# Patient Record
Sex: FEMALE | Race: WHITE | NOT HISPANIC OR LATINO | Employment: UNEMPLOYED | URBAN - NONMETROPOLITAN AREA
[De-identification: names, ages, dates, MRNs, and addresses within clinical notes are randomized per-mention and may not be internally consistent; named-entity substitution may affect disease eponyms.]

---

## 2017-11-16 ENCOUNTER — APPOINTMENT (OUTPATIENT)
Dept: GENERAL RADIOLOGY | Facility: HOSPITAL | Age: 62
End: 2017-11-16

## 2017-11-16 ENCOUNTER — HOSPITAL ENCOUNTER (EMERGENCY)
Facility: HOSPITAL | Age: 62
Discharge: HOME | End: 2017-11-16
Attending: EMERGENCY MEDICINE | Admitting: EMERGENCY MEDICINE

## 2017-11-16 VITALS
BODY MASS INDEX: 22.49 KG/M2 | RESPIRATION RATE: 17 BRPM | HEART RATE: 84 BPM | OXYGEN SATURATION: 93 % | SYSTOLIC BLOOD PRESSURE: 129 MMHG | HEIGHT: 65 IN | DIASTOLIC BLOOD PRESSURE: 89 MMHG | WEIGHT: 135 LBS | TEMPERATURE: 98.2 F

## 2017-11-16 DIAGNOSIS — S82.831A CLOSED FRACTURE OF DISTAL END OF RIGHT FIBULA, UNSPECIFIED FRACTURE MORPHOLOGY, INITIAL ENCOUNTER: Primary | ICD-10-CM

## 2017-11-16 PROCEDURE — 73610 X-RAY EXAM OF ANKLE: CPT

## 2017-11-16 PROCEDURE — 99284 EMERGENCY DEPT VISIT MOD MDM: CPT

## 2017-11-16 RX ORDER — OXYCODONE AND ACETAMINOPHEN 10; 325 MG/1; MG/1
1 TABLET ORAL EVERY 4 HOURS PRN
Qty: 12 TABLET | Refills: 0 | OUTPATIENT
Start: 2017-11-16 | End: 2018-12-12 | Stop reason: HOSPADM

## 2017-11-16 RX ORDER — OXYCODONE AND ACETAMINOPHEN 7.5; 325 MG/1; MG/1
1 TABLET ORAL ONCE
Status: COMPLETED | OUTPATIENT
Start: 2017-11-16 | End: 2017-11-16

## 2017-11-16 RX ORDER — PAROXETINE HYDROCHLORIDE 20 MG/1
20 TABLET, FILM COATED ORAL EVERY MORNING
COMMUNITY
End: 2018-12-17

## 2017-11-16 RX ADMIN — OXYCODONE HYDROCHLORIDE AND ACETAMINOPHEN 1 TABLET: 7.5; 325 TABLET ORAL at 20:59

## 2017-11-17 NOTE — ED PROVIDER NOTES
Subjective   HPI Comments: Patient complaining of right ankle pain.  She was walking to a convenience store when she rolled the ankle.  She was able to continue and ambulated on the ankle to this for and then another 3 blocks back home.  She has swelling the lateral aspect.      History provided by:  Patient      Review of Systems   Constitutional: Negative for activity change, fatigue and fever.   HENT: Negative for congestion, ear pain, facial swelling, postnasal drip, rhinorrhea, sinus pressure, sore throat and trouble swallowing.    Eyes: Negative for photophobia and redness.   Respiratory: Negative for chest tightness, shortness of breath and wheezing.    Cardiovascular: Negative for chest pain and palpitations.   Gastrointestinal: Negative for abdominal distention, abdominal pain, diarrhea, nausea and vomiting.   Genitourinary: Negative for difficulty urinating, dysuria and flank pain.   Musculoskeletal: Positive for joint swelling. Negative for back pain and neck pain.        Right ankle pain   Skin: Negative for color change and wound.   Neurological: Negative for dizziness, speech difficulty, weakness, light-headedness and headaches.   Psychiatric/Behavioral: Negative for agitation, confusion, self-injury and suicidal ideas.       Past Medical History:   Diagnosis Date   • Anxiety    • PTSD (post-traumatic stress disorder)        No Known Allergies    Past Surgical History:   Procedure Laterality Date   • BREAST AUGMENTATION     •  SECTION     • HYSTERECTOMY     • TONSILLECTOMY         History reviewed. No pertinent family history.    Social History     Social History   • Marital status:      Spouse name: N/A   • Number of children: N/A   • Years of education: N/A     Social History Main Topics   • Smoking status: Current Every Day Smoker     Packs/day: 1.50   • Smokeless tobacco: None   • Alcohol use Yes      Comment: OCCASIONAL   • Drug use: No   • Sexual activity: Not Asked     Other  Topics Concern   • None     Social History Narrative   • None           Objective   Physical Exam   Constitutional: She is oriented to person, place, and time. She appears well-developed and well-nourished. No distress.   HENT:   Head: Normocephalic and atraumatic.   Mouth/Throat: Oropharynx is clear and moist.   Eyes: Conjunctivae and EOM are normal. Pupils are equal, round, and reactive to light.   Musculoskeletal: She exhibits edema and tenderness. She exhibits no deformity.        Right foot: There is decreased range of motion, tenderness, bony tenderness and swelling. There is normal capillary refill, no crepitus and no deformity.        Neurological: She is alert and oriented to person, place, and time. No cranial nerve deficit.   Skin: Skin is warm and dry. No erythema.   Psychiatric: She has a normal mood and affect. Her behavior is normal. Judgment and thought content normal.   Nursing note and vitals reviewed.      Splint - Cast - Strapping  Date/Time: 11/16/2017 8:32 PM  Performed by: DRU KRAUSE  Authorized by: DRU KRAUSE     Consent:     Consent obtained:  Verbal    Consent given by:  Patient    Risks discussed:  Discoloration, numbness, pain and swelling    Alternatives discussed:  Delayed treatment  Pre-procedure details:     Sensation:  Normal    Skin color:  Normal  Procedure details:     Laterality:  Right    Location:  Ankle    Ankle:  R ankle    Strapping: no      Splint type:  Short leg    Supplies:  Ortho-Glass  Post-procedure details:     Pain:  Improved    Sensation:  Normal    Skin color:  Normal    Patient tolerance of procedure:  Tolerated well, no immediate complications             ED Course  ED Course                  MDM  Number of Diagnoses or Management Options  Closed fracture of distal end of right fibula, unspecified fracture morphology, initial encounter: new and requires workup  Diagnosis management comments: X-ray shows patient has a nondisplaced distal tib-fib  fracture.  Patient will be placed in a short-leg splint and referred to orthophoria further evaluation.       Amount and/or Complexity of Data Reviewed  Tests in the radiology section of CPT®: ordered and reviewed  Independent visualization of images, tracings, or specimens: yes    Risk of Complications, Morbidity, and/or Mortality  Presenting problems: moderate  Diagnostic procedures: moderate  Management options: moderate    Patient Progress  Patient progress: stable      Final diagnoses:   Closed fracture of distal end of right fibula, unspecified fracture morphology, initial encounter            Basilio Birmingham MD  11/16/17 2100

## 2018-06-10 ENCOUNTER — HOSPITAL ENCOUNTER (EMERGENCY)
Facility: HOSPITAL | Age: 63
Discharge: HOME | End: 2018-06-11
Attending: FAMILY MEDICINE | Admitting: EMERGENCY MEDICINE

## 2018-06-10 ENCOUNTER — APPOINTMENT (OUTPATIENT)
Dept: CT IMAGING | Facility: HOSPITAL | Age: 63
End: 2018-06-10

## 2018-06-10 DIAGNOSIS — N30.90 CYSTITIS: ICD-10-CM
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
## 2018-06-10 DIAGNOSIS — T07.XXXA MULTIPLE CONTUSIONS: ICD-10-CM

## 2018-06-10 LAB
ANION GAP SERPL CALCULATED.3IONS-SCNC: 10 MMOL/L (ref 4–13)
BACTERIA UR QL AUTO: ABNORMAL /HPF
BASOPHILS # BLD AUTO: 0.04 10*3/MM3 (ref 0–0.2)
BASOPHILS NFR BLD AUTO: 0.4 % (ref 0–2)
BILIRUB UR QL STRIP: ABNORMAL
BUN BLD-MCNC: 20 MG/DL (ref 5–21)
BUN/CREAT SERPL: 17.4 (ref 7–25)
CALCIUM SPEC-SCNC: 9.3 MG/DL (ref 8.4–10.4)
CHLORIDE SERPL-SCNC: 103 MMOL/L (ref 98–110)
CLARITY UR: ABNORMAL
CO2 SERPL-SCNC: 30 MMOL/L (ref 24–31)
COD CRY URNS QL: ABNORMAL /HPF
COLOR UR: ABNORMAL
CREAT BLD-MCNC: 1.15 MG/DL (ref 0.5–1.4)
DEPRECATED RDW RBC AUTO: 39.4 FL (ref 40–54)
EOSINOPHIL # BLD AUTO: 0.03 10*3/MM3 (ref 0–0.7)
EOSINOPHIL NFR BLD AUTO: 0.3 % (ref 0–4)
ERYTHROCYTE [DISTWIDTH] IN BLOOD BY AUTOMATED COUNT: 12.7 % (ref 12–15)
GFR SERPL CREATININE-BSD FRML MDRD: 48 ML/MIN/1.73
GLUCOSE BLD-MCNC: 101 MG/DL (ref 70–100)
GLUCOSE UR STRIP-MCNC: NEGATIVE MG/DL
HCT VFR BLD AUTO: 40.9 % (ref 37–47)
HGB BLD-MCNC: 13.9 G/DL (ref 12–16)
HGB UR QL STRIP.AUTO: NEGATIVE
HYALINE CASTS UR QL AUTO: ABNORMAL /LPF
IMM GRANULOCYTES # BLD: 0.05 10*3/MM3 (ref 0–0.03)
IMM GRANULOCYTES NFR BLD: 0.5 % (ref 0–5)
KETONES UR QL STRIP: ABNORMAL
LEUKOCYTE ESTERASE UR QL STRIP.AUTO: ABNORMAL
LYMPHOCYTES # BLD AUTO: 1.06 10*3/MM3 (ref 0.72–4.86)
LYMPHOCYTES NFR BLD AUTO: 11.1 % (ref 15–45)
MCH RBC QN AUTO: 28.9 PG (ref 28–32)
MCHC RBC AUTO-ENTMCNC: 34 G/DL (ref 33–36)
MCV RBC AUTO: 85 FL (ref 82–98)
MONOCYTES # BLD AUTO: 0.48 10*3/MM3 (ref 0.19–1.3)
MONOCYTES NFR BLD AUTO: 5 % (ref 4–12)
NEUTROPHILS # BLD AUTO: 7.85 10*3/MM3 (ref 1.87–8.4)
NEUTROPHILS NFR BLD AUTO: 82.7 % (ref 39–78)
NITRITE UR QL STRIP: NEGATIVE
NRBC BLD MANUAL-RTO: 0 /100 WBC (ref 0–0)
PH UR STRIP.AUTO: <=5 [PH] (ref 5–8)
PLATELET # BLD AUTO: 308 10*3/MM3 (ref 130–400)
PMV BLD AUTO: 9.9 FL (ref 6–12)
POTASSIUM BLD-SCNC: 4.1 MMOL/L (ref 3.5–5.3)
PROT UR QL STRIP: ABNORMAL
RBC # BLD AUTO: 4.81 10*6/MM3 (ref 4.2–5.4)
RBC # UR: ABNORMAL /HPF
REF LAB TEST METHOD: ABNORMAL
SODIUM BLD-SCNC: 143 MMOL/L (ref 135–145)
SP GR UR STRIP: >=1.03 (ref 1–1.03)
SQUAMOUS #/AREA URNS HPF: ABNORMAL /HPF
UROBILINOGEN UR QL STRIP: ABNORMAL
WBC NRBC COR # BLD: 9.51 10*3/MM3 (ref 4.8–10.8)
WBC UR QL AUTO: ABNORMAL /HPF

## 2018-06-10 PROCEDURE — 99284 EMERGENCY DEPT VISIT MOD MDM: CPT

## 2018-06-10 PROCEDURE — 85025 COMPLETE CBC W/AUTO DIFF WBC: CPT | Performed by: FAMILY MEDICINE

## 2018-06-10 PROCEDURE — 81001 URINALYSIS AUTO W/SCOPE: CPT | Performed by: FAMILY MEDICINE

## 2018-06-10 PROCEDURE — 80048 BASIC METABOLIC PNL TOTAL CA: CPT | Performed by: FAMILY MEDICINE

## 2018-06-10 PROCEDURE — 71260 CT THORAX DX C+: CPT

## 2018-06-10 PROCEDURE — 70450 CT HEAD/BRAIN W/O DYE: CPT

## 2018-06-10 PROCEDURE — 70486 CT MAXILLOFACIAL W/O DYE: CPT

## 2018-06-10 PROCEDURE — 25010000002 IOPAMIDOL 61 % SOLUTION: Performed by: FAMILY MEDICINE

## 2018-06-10 PROCEDURE — 72125 CT NECK SPINE W/O DYE: CPT

## 2018-06-10 RX ADMIN — IOPAMIDOL 100 ML: 612 INJECTION, SOLUTION INTRAVENOUS at 23:32

## 2018-06-11 VITALS
WEIGHT: 120 LBS | DIASTOLIC BLOOD PRESSURE: 56 MMHG | TEMPERATURE: 97.2 F | SYSTOLIC BLOOD PRESSURE: 93 MMHG | RESPIRATION RATE: 16 BRPM | BODY MASS INDEX: 19.99 KG/M2 | HEART RATE: 74 BPM | OXYGEN SATURATION: 96 % | HEIGHT: 65 IN

## 2018-06-11 RX ORDER — DICLOFENAC SODIUM 75 MG/1
75 TABLET, DELAYED RELEASE ORAL 2 TIMES DAILY
Qty: 14 TABLET | Refills: 0 | Status: SHIPPED | OUTPATIENT
Start: 2018-06-11 | End: 2018-12-17

## 2018-06-11 RX ORDER — SULFAMETHOXAZOLE AND TRIMETHOPRIM 800; 160 MG/1; MG/1
1 TABLET ORAL 2 TIMES DAILY
Qty: 20 TABLET | Refills: 0 | Status: SHIPPED | OUTPATIENT
Start: 2018-06-11 | End: 2018-12-17
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
## 2018-06-19 ENCOUNTER — APPOINTMENT (OUTPATIENT)
Dept: GENERAL RADIOLOGY | Facility: HOSPITAL | Age: 63
End: 2018-06-19

## 2018-06-19 ENCOUNTER — HOSPITAL ENCOUNTER (EMERGENCY)
Facility: HOSPITAL | Age: 63
Discharge: HOME | End: 2018-06-19
Admitting: EMERGENCY MEDICINE

## 2018-06-19 VITALS
TEMPERATURE: 98.4 F | OXYGEN SATURATION: 97 % | HEIGHT: 65 IN | DIASTOLIC BLOOD PRESSURE: 66 MMHG | WEIGHT: 120 LBS | BODY MASS INDEX: 19.99 KG/M2 | SYSTOLIC BLOOD PRESSURE: 107 MMHG | RESPIRATION RATE: 12 BRPM | HEART RATE: 75 BPM
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
## 2018-06-19 DIAGNOSIS — S20.219S: ICD-10-CM

## 2018-06-19 DIAGNOSIS — S39.92XS BACK INJURY, SEQUELA: ICD-10-CM

## 2018-06-19 PROCEDURE — 72074 X-RAY EXAM THORAC SPINE4/>VW: CPT

## 2018-06-19 PROCEDURE — 71046 X-RAY EXAM CHEST 2 VIEWS: CPT

## 2018-06-19 PROCEDURE — 99282 EMERGENCY DEPT VISIT SF MDM: CPT

## 2018-06-19 RX ORDER — CLONAZEPAM 1 MG/1
1 TABLET ORAL 3 TIMES DAILY PRN
COMMUNITY
End: 2021-01-18
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
## 2018-12-12 ENCOUNTER — APPOINTMENT (OUTPATIENT)
Dept: CT IMAGING | Facility: HOSPITAL | Age: 63
End: 2018-12-12

## 2018-12-12 ENCOUNTER — HOSPITAL ENCOUNTER (EMERGENCY)
Facility: HOSPITAL | Age: 63
Discharge: HOME | End: 2018-12-12
Admitting: EMERGENCY MEDICINE

## 2018-12-12 ENCOUNTER — APPOINTMENT (OUTPATIENT)
Dept: GENERAL RADIOLOGY | Facility: HOSPITAL | Age: 63
End: 2018-12-12

## 2018-12-12 VITALS
SYSTOLIC BLOOD PRESSURE: 107 MMHG | TEMPERATURE: 98 F | RESPIRATION RATE: 18 BRPM | DIASTOLIC BLOOD PRESSURE: 61 MMHG | HEART RATE: 71 BPM | HEIGHT: 65 IN | BODY MASS INDEX: 22.39 KG/M2 | WEIGHT: 134.4 LBS | OXYGEN SATURATION: 93 %

## 2018-12-12 DIAGNOSIS — S02.2XXA CLOSED FRACTURE OF NASAL BONE, INITIAL ENCOUNTER: ICD-10-CM

## 2018-12-12 DIAGNOSIS — S09.93XA FACIAL INJURY, INITIAL ENCOUNTER: ICD-10-CM
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
## 2018-12-12 LAB
BILIRUB UR QL STRIP: NEGATIVE
CLARITY UR: CLEAR
COLOR UR: YELLOW
GLUCOSE UR STRIP-MCNC: NEGATIVE MG/DL
HGB UR QL STRIP.AUTO: NEGATIVE
KETONES UR QL STRIP: ABNORMAL
LEUKOCYTE ESTERASE UR QL STRIP.AUTO: NEGATIVE
NITRITE UR QL STRIP: NEGATIVE
PH UR STRIP.AUTO: <=5 [PH] (ref 5–8)
PROT UR QL STRIP: NEGATIVE
SP GR UR STRIP: 1.03 (ref 1–1.03)
UROBILINOGEN UR QL STRIP: ABNORMAL

## 2018-12-12 PROCEDURE — 72125 CT NECK SPINE W/O DYE: CPT

## 2018-12-12 PROCEDURE — 70486 CT MAXILLOFACIAL W/O DYE: CPT

## 2018-12-12 PROCEDURE — 71046 X-RAY EXAM CHEST 2 VIEWS: CPT

## 2018-12-12 PROCEDURE — 70450 CT HEAD/BRAIN W/O DYE: CPT

## 2018-12-12 PROCEDURE — 81003 URINALYSIS AUTO W/O SCOPE: CPT | Performed by: PHYSICIAN ASSISTANT

## 2018-12-12 PROCEDURE — 99283 EMERGENCY DEPT VISIT LOW MDM: CPT

## 2018-12-12 RX ORDER — AMOXICILLIN AND CLAVULANATE POTASSIUM 875; 125 MG/1; MG/1
1 TABLET, FILM COATED ORAL 2 TIMES DAILY
Qty: 14 TABLET | Refills: 0 | Status: SHIPPED | OUTPATIENT
Start: 2018-12-12 | End: 2021-01-18

## 2018-12-12 RX ORDER — ACETAMINOPHEN AND CODEINE PHOSPHATE 300; 30 MG/1; MG/1
1 TABLET ORAL EVERY 4 HOURS PRN
Qty: 8 TABLET | Refills: 0 | Status: SHIPPED | OUTPATIENT
Start: 2018-12-12 | End: 2018-12-17
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
## 2018-12-12 NOTE — ED NOTES
TALKED TO LATOYA FROM CASE MANAGEMENT ABOUT PATIENTS RESPONSE ABOUT NOT FEELING SAFE AT HOME.      Shruti Rivera, RN  12/12/18 1994
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
## 2018-12-12 NOTE — DISCHARGE INSTRUCTIONS
Nasal Fracture  A fracture is a break in a bone. A nasal fracture is a broken nose. Minor breaks do not need treatment. Serious breaks may need surgery.  Follow these instructions at home:  · If directed, put ice on the injured area:  ? Put ice in a plastic bag.  ? Place a towel between your skin and the bag.  ? Leave the ice on for 20 minutes, 2-3 times per day.  · Take over-the-counter and prescription medicines only as told by your doctor.  · If your nose bleeds, sit up while you gently squeeze your nose shut for 10 minutes.  · Try to not blow your nose.  · Return to your normal activities as told by your doctor. Ask your doctor what activities are safe for you.  · Do not play contact sports for 3-4 weeks or as told by your doctor.  · Keep all follow-up visits as told by your doctor. This is important.  Contact a doctor if:  · You have more pain or very bad pain.  · You keep having nosebleeds.  · The shape of your nose does not return to normal after 5 days.  · You have pus coming out of your nose.  Get help right away if:  · Your nose bleeds for more than 20 minutes.  · You have clear fluid draining out of your nose.  · You have a grape-like swelling on the inside of your nose.  · You have trouble moving your eyes.  · You keep throwing up (vomiting).  This information is not intended to replace advice given to you by your health care provider. Make sure you discuss any questions you have with your health care provider.  Document Released: 09/26/2009 Document Revised: 05/25/2017 Document Reviewed: 01/25/2016  Elsevier Interactive Patient Education © 2018 [employer]

## 2018-12-17 ENCOUNTER — OFFICE VISIT (OUTPATIENT)
Dept: OTOLARYNGOLOGY | Facility: CLINIC | Age: 63
End: 2018-12-17

## 2018-12-17 ENCOUNTER — APPOINTMENT (OUTPATIENT)
Dept: PREADMISSION TESTING | Facility: HOSPITAL | Age: 63
End: 2018-12-17

## 2018-12-17 VITALS
DIASTOLIC BLOOD PRESSURE: 68 MMHG | HEART RATE: 78 BPM | HEIGHT: 65 IN | BODY MASS INDEX: 22.49 KG/M2 | SYSTOLIC BLOOD PRESSURE: 110 MMHG | WEIGHT: 135 LBS | TEMPERATURE: 98.2 F | RESPIRATION RATE: 20 BRPM

## 2018-12-17 VITALS
WEIGHT: 134.92 LBS | RESPIRATION RATE: 15 BRPM | OXYGEN SATURATION: 97 % | SYSTOLIC BLOOD PRESSURE: 106 MMHG | HEIGHT: 65 IN | HEART RATE: 80 BPM | BODY MASS INDEX: 22.48 KG/M2 | DIASTOLIC BLOOD PRESSURE: 57 MMHG

## 2018-12-17 DIAGNOSIS — S02.2XXA CLOSED FRACTURE OF NASAL BONE, INITIAL ENCOUNTER: Primary | ICD-10-CM

## 2018-12-17 DIAGNOSIS — S02.2XXA CLOSED FRACTURE OF NASAL SEPTUM, INITIAL ENCOUNTER: ICD-10-CM

## 2018-12-17 DIAGNOSIS — Z72.0 TOBACCO ABUSE DISORDER: ICD-10-CM

## 2018-12-17 LAB
DEPRECATED RDW RBC AUTO: 40 FL (ref 40–54)
ERYTHROCYTE [DISTWIDTH] IN BLOOD BY AUTOMATED COUNT: 12.8 % (ref 12–15)
HCT VFR BLD AUTO: 34.8 % (ref 37–47)
HGB BLD-MCNC: 12 G/DL (ref 12–16)
MCH RBC QN AUTO: 29.6 PG (ref 28–32)
MCHC RBC AUTO-ENTMCNC: 34.5 G/DL (ref 33–36)
MCV RBC AUTO: 85.7 FL (ref 82–98)
PLATELET # BLD AUTO: 275 10*3/MM3 (ref 130–400)
PMV BLD AUTO: 10.3 FL (ref 6–12)
RBC # BLD AUTO: 4.06 10*6/MM3 (ref 4.2–5.4)
WBC NRBC COR # BLD: 6.08 10*3/MM3 (ref 4.8–10.8)

## 2018-12-17 PROCEDURE — 85027 COMPLETE CBC AUTOMATED: CPT | Performed by: OTOLARYNGOLOGY

## 2018-12-17 PROCEDURE — 99203 OFFICE O/P NEW LOW 30 MIN: CPT | Performed by: OTOLARYNGOLOGY

## 2018-12-17 PROCEDURE — 93005 ELECTROCARDIOGRAM TRACING: CPT

## 2018-12-17 PROCEDURE — 93010 ELECTROCARDIOGRAM REPORT: CPT | Performed by: INTERNAL MEDICINE

## 2018-12-17 PROCEDURE — 36415 COLL VENOUS BLD VENIPUNCTURE: CPT

## 2018-12-17 RX ORDER — DEXTROAMPHETAMINE SACCHARATE, AMPHETAMINE ASPARTATE MONOHYDRATE, DEXTROAMPHETAMINE SULFATE AND AMPHETAMINE SULFATE 2.5; 2.5; 2.5; 2.5 MG/1; MG/1; MG/1; MG/1
20 CAPSULE, EXTENDED RELEASE ORAL EVERY MORNING
COMMUNITY
End: 2021-01-18

## 2018-12-17 RX ORDER — SERTRALINE HYDROCHLORIDE 100 MG/1
TABLET, FILM COATED ORAL
Refills: 3 | COMMUNITY
Start: 2018-12-03 | End: 2021-01-18

## 2018-12-17 NOTE — PATIENT INSTRUCTIONS
##### TOBACCO CESSATION #####   IF YOU SMOKE OR USE TOBACCO PLEASE READ THE FOLLOWING:    Why is smoking bad for me?  Smoking increases the risk of heart disease, lung disease, vascular disease, stroke, and cancer.     If you smoke, STOP!    If you would like more information on quitting smoking, please visit the [employer] website: [email or web address]   This link will provide additional resources including the QUIT line and the Beat the Pack support groups.     For more information:    Quit Now [place]  1-800-QUIT-NOW  [email or web address]
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
## 2018-12-17 NOTE — DISCHARGE INSTRUCTIONS
DAY OF SURGERY INSTRUCTIONS        YOUR SURGEON: Darryl Benites     PROCEDURE: Closed Reduction of Nasal Fracture and Repair of Nasal Septal Fracture     DATE OF SURGERY: December 18, 2018     ARRIVAL TIME: AS DIRECTED BY OFFICE    YOU MAY TAKE THE FOLLOWING MEDICATION(S) THE MORNING OF SURGERY WITH A SIP OF WATER: Klonipin              MANAGING PAIN AFTER SURGERY    We know you are probably wondering what your pain will be like after surgery.  Following surgery it is unrealistic to expect you will not have pain.   Pain is how our bodies let us know that something is wrong or cautions us to be careful.  That said, our goal is to make your pain tolerable.    Methods we may use to treat your pain include (oral or IV medications, PCAs, epidurals, nerve blocks, etc.)   While some procedures require IV pain medications for a short time after surgery, transitioning to pain medications by mouth allows for better management of pain.   Your nurse will encourage you to take oral pain medications whenever possible.  IV medications work almost immediately, but only last a short while.  Taking medications by mouth allows for a more constant level of medication in your blood stream for a longer period of time.      Once your pain is out of control it is harder to get back under control.  It is important you are aware when your next dose of pain medication is due.  If you are admitted, your nurse may write the time of your next dose on the white board in your room to help you remember.      We are interested in your pain and encourage you to inform us about aggravating factors during your visit.   Many times a simple repositioning every few hours can make a big difference.    If your physician says it is okay, do not let your pain prevent you from getting out of bed. Be sure to call your nurse for assistance prior to getting up so you do not fall.      Before surgery, please decide your tolerable pain goal.  These faces help  describe the pain ratings we use on a 0-10 scale.   Be prepared to tell us your goal and whether or not you take pain or anxiety medications at home.            BEFORE YOU COME TO THE HOSPITAL  (Pre-op instructions)  • Do not eat, drink, smoke or chew gum after midnight the night before surgery.  This also includes no mints.  • Morning of surgery take only the medicines you have been instructed with a sip of water unless otherwise instructed  by your physician.  • Do not shave, wear makeup or dark nail polish.  • Remove all jewelry including rings.  • Leave anything you consider valuable at home.  • Leave your suitcase in the car until after your surgery.  • Bring the following with you if applicable:  o Picture ID and insurance, Medicare or Medicaid cards  o Co-pay/deductible required by insurance (cash, check, credit card)  o Copy of advance directive, living will or power-of- documents if not brought to PAT  o CPAP or BIPAP mask and tubing  o Relaxation aids (MP3 player, book, magazine)  • On the day of surgery check in at registration located at the main entrance of the hospital.       Outpatient Surgery Guidelines, Adult  Outpatient procedures are those for which the person having the procedure is allowed to go home the same day as the procedure. Various procedures are done on an outpatient basis. You should follow some general guidelines if you will be having an outpatient procedure.  LET YOUR HEALTH CARE PROVIDER KNOW ABOUT:  · Any allergies you have.  · All medicines you are taking, including vitamins, herbs, eye drops, creams, and over-the-counter medicines.  · Previous problems you or members of your family have had with the use of anesthetics.  · Any blood disorders you have.  · Previous surgeries you have had.  · Medical conditions you have.  RISKS AND COMPLICATIONS  Your health care provider will discuss possible risks and complications with you before surgery. Common risks and complications  include:    · Problems due to the use of anesthetics.  · Blood loss and replacement (does not apply to minor surgical procedures).  · Temporary increase in pain due to surgery.  · Uncorrected pain or problems that the surgery was meant to correct.  · Infection.  · New damage.  BEFORE THE PROCEDURE  · Ask your health care provider about changing or stopping your regular medicines. You may need to stop taking certain medicines in the days or weeks before the procedure.  · Stop smoking at least 2 weeks before surgery. This lowers your risk for complications during and after surgery. Ask your health care provider for help with this if needed.  · Eat your usual meals and a light supper the day before surgery. Continue fluid intake. Do not drink alcohol.  · Do not eat or drink after midnight the night before your surgery.   · Arrange for someone to take you home and to stay with you for 24 hours after the procedure. Medicine given for your procedure may affect your ability to drive or to care for yourself.  · Call your health care provider's office if you develop an illness or problem that may prevent you from safely having your procedure.  AFTER THE PROCEDURE  After surgery, you will be taken to a recovery area, where your progress will be monitored. If there are no complications, you will be allowed to go home when you are awake, stable, and taking fluids well. You may have numbness around the surgical site. Healing will take some time. You will have tenderness at the surgical site and may have some swelling and bruising. You may also have some nausea.  HOME CARE INSTRUCTIONS  · Do not drive for 24 hours, or as directed by your health care provider. Do not drive while taking prescription pain medicines.  · Do not drink alcohol for 24 hours.  · Do not make important decisions or sign legal documents for 24 hours.  · You may resume a normal diet and activities as directed.  · Do not lift anything heavier than 10 pounds  (4.5 kg) or play contact sports until your health care provider says it is okay.  · Change your bandages (dressings) as directed.  · Only take over-the-counter or prescription medicines as directed by your health care provider.  · Follow up with your health care provider as directed.  SEEK MEDICAL CARE IF:  · You have increased bleeding (more than a small spot) from the surgical site.  · You have redness, swelling, or increasing pain in the wound.  · You see pus coming from the wound.  · You have a fever.  · You notice a bad smell coming from the wound or dressing.  · You feel lightheaded or faint.  · You develop a rash.  · You have trouble breathing.  · You develop allergies.  MAKE SURE YOU:  · Understand these instructions.  · Will watch your condition.  · Will get help right away if you are not doing well or get worse.     This information is not intended to replace advice given to you by your health care provider. Make sure you discuss any questions you have with your health care provider.     Document Released: 09/12/2002 Document Revised: 05/03/2016 Document Reviewed: 05/22/2014  [employer] Interactive Patient Education ©2016 [employer]       Fall Prevention in Hospitals, Adult  As a hospital patient, your condition and the treatments you receive can increase your risk for falls. Some additional risk factors for falls in a hospital include:  · Being in an unfamiliar environment.  · Being on bed rest.  · Your surgery.  · Taking certain medicines.  · Your tubing requirements, such as intravenous (IV) therapy or catheters.  It is important that you learn how to decrease fall risks while at the hospital. Below are important tips that can help prevent falls.  SAFETY TIPS FOR PREVENTING FALLS  Talk about your risk of falling.  · Ask your health care provider why you are at risk for falling. Is it your medicine, illness, tubing placement, or something else?  · Make a plan with your health care provider to keep you  safe from falls.  · Ask your health care provider or pharmacist about side effects of your medicines. Some medicines can make you dizzy or affect your coordination.  Ask for help.  · Ask for help before getting out of bed. You may need to press your call button.  · Ask for assistance in getting safely to the toilet.  · Ask for a walker or cane to be put at your bedside. Ask that most of the side rails on your bed be placed up before your health care provider leaves the room.  · Ask family or friends to sit with you.  · Ask for things that are out of your reach, such as your glasses, hearing aids, telephone, bedside table, or call button.  Follow these tips to avoid falling:  · Stay lying or seated, rather than standing, while waiting for help.  · Wear rubber-soled slippers or shoes whenever you walk in the hospital.  · Avoid quick, sudden movements.  ¨ Change positions slowly.  ¨ Sit on the side of your bed before standing.  ¨ Stand up slowly and wait before you start to walk.  · Let your health care provider know if there is a spill on the floor.  · Pay careful attention to the medical equipment, electrical cords, and tubes around you.  · When you need help, use your call button by your bed or in the bathroom. Wait for one of your health care providers to help you.  · If you feel dizzy or unsure of your footing, return to bed and wait for assistance.  · Avoid being distracted by the TV, telephone, or another person in your room.  · Do not lean or support yourself on rolling objects, such as IV poles or bedside tables.     This information is not intended to replace advice given to you by your health care provider. Make sure you discuss any questions you have with your health care provider.     Document Released: 12/15/2001 Document Revised: 01/08/2016 Document Reviewed: 08/25/2013  [employer] Interactive Patient Education ©2016 [employer]       Surgical Site Infections FAQs  What is a Surgical Site Infection  (SSI)?  A surgical site infection is an infection that occurs after surgery in the part of the body where the surgery took place. Most patients who have surgery do not develop an infection. However, infections develop in about 1 to 3 out of every 100 patients who have surgery.  Some of the common symptoms of a surgical site infection are:  · Redness and pain around the area where you had surgery  · Drainage of cloudy fluid from your surgical wound  · Fever  Can SSIs be treated?  Yes. Most surgical site infections can be treated with antibiotics. The antibiotic given to you depends on the bacteria (germs) causing the infection. Sometimes patients with SSIs also need another surgery to treat the infection.  What are some of the things that hospitals are doing to prevent SSIs?  To prevent SSIs, doctors, nurses, and other healthcare providers:  · Clean their hands and arms up to their elbows with an antiseptic agent just before the surgery.  · Clean their hands with soap and water or an alcohol-based hand rub before and after caring for each patient.  · May remove some of your hair immediately before your surgery using electric clippers if the hair is in the same area where the procedure will occur. They should not shave you with a razor.  · Wear special hair covers, masks, gowns, and gloves during surgery to keep the surgery area clean.  · Give you antibiotics before your surgery starts. In most cases, you should get antibiotics within 60 minutes before the surgery starts and the antibiotics should be stopped within 24 hours after surgery.  · Clean the skin at the site of your surgery with a special soap that kills germs.  What can I do to help prevent SSIs?  Before your surgery:  · Tell your doctor about other medical problems you may have. Health problems such as allergies, diabetes, and obesity could affect your surgery and your treatment.  · Quit smoking. Patients who smoke get more infections. Talk to your doctor  about how you can quit before your surgery.  · Do not shave near where you will have surgery. Shaving with a razor can irritate your skin and make it easier to develop an infection.  At the time of your surgery:  · Speak up if someone tries to shave you with a razor before surgery. Ask why you need to be shaved and talk with your surgeon if you have any concerns.  · Ask if you will get antibiotics before surgery.  After your surgery:  · Make sure that your healthcare providers clean their hands before examining you, either with soap and water or an alcohol-based hand rub.  · If you do not see your providers clean their hands, please ask them to do so.  · Family and friends who visit you should not touch the surgical wound or dressings.  · Family and friends should clean their hands with soap and water or an alcohol-based hand rub before and after visiting you. If you do not see them clean their hands, ask them to clean their hands.  What do I need to do when I go home from the hospital?  · Before you go home, your doctor or nurse should explain everything you need to know about taking care of your wound. Make sure you understand how to care for your wound before you leave the hospital.  · Always clean your hands before and after caring for your wound.  · Before you go home, make sure you know who to contact if you have questions or problems after you get home.  · If you have any symptoms of an infection, such as redness and pain at the surgery site, drainage, or fever, call your doctor immediately.  If you have additional questions, please ask your doctor or nurse.  Developed and co-sponsored by The Society for Healthcare Epidemiology of [place] (SHEA); Infectious Diseases Society of [place] (IDSA); American Hospital Association; Association for Professionals in Infection Control and Epidemiology (APIC); Centers for Disease Control and Prevention (CDC); and The Joint Commission.     This information is not intended  to replace advice given to you by your health care provider. Make sure you discuss any questions you have with your health care provider.     Document Released: 12/23/2014 Document Revised: 01/08/2016 Document Reviewed: 03/02/2016  [employer] Interactive Patient Education ©2016 [employer]     PATIENT/FAMILY/RESPONSIBLE PARTY VERBALIZES UNDERSTANDING OF ABOVE EDUCATION.  COPY OF PAIN SCALE GIVEN AND REVIEWED WITH VERBALIZED UNDERSTANDING.

## 2018-12-18 ENCOUNTER — ANESTHESIA (OUTPATIENT)
Dept: PERIOP | Facility: HOSPITAL | Age: 63
End: 2018-12-18

## 2018-12-18 ENCOUNTER — HOSPITAL ENCOUNTER (OUTPATIENT)
Facility: HOSPITAL | Age: 63
Setting detail: HOSPITAL OUTPATIENT SURGERY
Discharge: HOME | End: 2018-12-18
Attending: OTOLARYNGOLOGY | Admitting: OTOLARYNGOLOGY

## 2018-12-18 ENCOUNTER — ANESTHESIA EVENT (OUTPATIENT)
Dept: PERIOP | Facility: HOSPITAL | Age: 63
End: 2018-12-18

## 2018-12-18 VITALS
HEART RATE: 68 BPM | OXYGEN SATURATION: 95 % | TEMPERATURE: 98.6 F | SYSTOLIC BLOOD PRESSURE: 119 MMHG | RESPIRATION RATE: 16 BRPM | DIASTOLIC BLOOD PRESSURE: 70 MMHG

## 2018-12-18 PROCEDURE — 21336 OPEN TX SEPTAL FX W/WO STABJ: CPT | Performed by: OTOLARYNGOLOGY

## 2018-12-18 PROCEDURE — 21320 CLSD TX NSL FX W/MNPJ&STABLJ: CPT | Performed by: OTOLARYNGOLOGY

## 2018-12-18 PROCEDURE — 25010000002 SUCCINYLCHOLINE PER 20 MG: Performed by: NURSE ANESTHETIST, CERTIFIED REGISTERED

## 2018-12-18 PROCEDURE — 25010000002 DEXAMETHASONE PER 1 MG: Performed by: NURSE ANESTHETIST, CERTIFIED REGISTERED

## 2018-12-18 PROCEDURE — 25010000002 PROPOFOL 10 MG/ML EMULSION: Performed by: NURSE ANESTHETIST, CERTIFIED REGISTERED

## 2018-12-18 PROCEDURE — 25010000002 ONDANSETRON PER 1 MG: Performed by: NURSE ANESTHETIST, CERTIFIED REGISTERED

## 2018-12-18 PROCEDURE — 25010000003 CEFAZOLIN PER 500 MG: Performed by: NURSE ANESTHETIST, CERTIFIED REGISTERED

## 2018-12-18 RX ORDER — ONDANSETRON 2 MG/ML
INJECTION INTRAMUSCULAR; INTRAVENOUS AS NEEDED
Status: DISCONTINUED | OUTPATIENT
Start: 2018-12-18 | End: 2018-12-18 | Stop reason: SURG

## 2018-12-18 RX ORDER — LIDOCAINE HYDROCHLORIDE 20 MG/ML
INJECTION, SOLUTION INFILTRATION; PERINEURAL AS NEEDED
Status: DISCONTINUED | OUTPATIENT
Start: 2018-12-18 | End: 2018-12-18 | Stop reason: SURG

## 2018-12-18 RX ORDER — FAMOTIDINE 10 MG/ML
20 INJECTION, SOLUTION INTRAVENOUS
Status: DISCONTINUED | OUTPATIENT
Start: 2018-12-18 | End: 2018-12-18 | Stop reason: HOSPADM

## 2018-12-18 RX ORDER — SCOLOPAMINE TRANSDERMAL SYSTEM 1 MG/1
1 PATCH, EXTENDED RELEASE TRANSDERMAL CONTINUOUS
Status: DISCONTINUED | OUTPATIENT
Start: 2018-12-18 | End: 2018-12-18 | Stop reason: HOSPADM

## 2018-12-18 RX ORDER — LABETALOL HYDROCHLORIDE 5 MG/ML
5 INJECTION, SOLUTION INTRAVENOUS
Status: DISCONTINUED | OUTPATIENT
Start: 2018-12-18 | End: 2018-12-18 | Stop reason: HOSPADM

## 2018-12-18 RX ORDER — OXYCODONE AND ACETAMINOPHEN 7.5; 325 MG/1; MG/1
2 TABLET ORAL EVERY 4 HOURS PRN
Status: DISCONTINUED | OUTPATIENT
Start: 2018-12-18 | End: 2018-12-18 | Stop reason: HOSPADM

## 2018-12-18 RX ORDER — NALOXONE HCL 0.4 MG/ML
0.4 VIAL (ML) INJECTION AS NEEDED
Status: DISCONTINUED | OUTPATIENT
Start: 2018-12-18 | End: 2018-12-18 | Stop reason: HOSPADM

## 2018-12-18 RX ORDER — IBUPROFEN 600 MG/1
600 TABLET ORAL ONCE AS NEEDED
Status: DISCONTINUED | OUTPATIENT
Start: 2018-12-18 | End: 2018-12-18 | Stop reason: HOSPADM

## 2018-12-18 RX ORDER — ACETAMINOPHEN 500 MG
1000 TABLET ORAL ONCE
Status: DISCONTINUED | OUTPATIENT
Start: 2018-12-18 | End: 2018-12-18

## 2018-12-18 RX ORDER — IPRATROPIUM BROMIDE AND ALBUTEROL SULFATE 2.5; .5 MG/3ML; MG/3ML
3 SOLUTION RESPIRATORY (INHALATION) ONCE AS NEEDED
Status: DISCONTINUED | OUTPATIENT
Start: 2018-12-18 | End: 2018-12-18 | Stop reason: HOSPADM

## 2018-12-18 RX ORDER — MEPERIDINE HYDROCHLORIDE 25 MG/ML
12.5 INJECTION INTRAMUSCULAR; INTRAVENOUS; SUBCUTANEOUS
Status: DISCONTINUED | OUTPATIENT
Start: 2018-12-18 | End: 2018-12-18 | Stop reason: HOSPADM

## 2018-12-18 RX ORDER — MIDAZOLAM HYDROCHLORIDE 1 MG/ML
2 INJECTION INTRAMUSCULAR; INTRAVENOUS
Status: DISCONTINUED | OUTPATIENT
Start: 2018-12-18 | End: 2018-12-18 | Stop reason: HOSPADM

## 2018-12-18 RX ORDER — DEXAMETHASONE SODIUM PHOSPHATE 4 MG/ML
4 INJECTION, SOLUTION INTRA-ARTICULAR; INTRALESIONAL; INTRAMUSCULAR; INTRAVENOUS; SOFT TISSUE ONCE AS NEEDED
Status: COMPLETED | OUTPATIENT
Start: 2018-12-18 | End: 2018-12-18

## 2018-12-18 RX ORDER — HYDROCODONE BITARTRATE AND ACETAMINOPHEN 7.5; 325 MG/1; MG/1
1 TABLET ORAL EVERY 4 HOURS PRN
Qty: 12 TABLET | Refills: 0 | Status: SHIPPED | OUTPATIENT
Start: 2018-12-18 | End: 2021-01-18

## 2018-12-18 RX ORDER — SODIUM CHLORIDE 0.9 % (FLUSH) 0.9 %
1-10 SYRINGE (ML) INJECTION AS NEEDED
Status: DISCONTINUED | OUTPATIENT
Start: 2018-12-18 | End: 2018-12-18 | Stop reason: HOSPADM

## 2018-12-18 RX ORDER — LIDOCAINE HYDROCHLORIDE 40 MG/ML
SOLUTION TOPICAL AS NEEDED
Status: DISCONTINUED | OUTPATIENT
Start: 2018-12-18 | End: 2018-12-18 | Stop reason: SURG

## 2018-12-18 RX ORDER — OXYCODONE AND ACETAMINOPHEN 10; 325 MG/1; MG/1
1 TABLET ORAL ONCE AS NEEDED
Status: DISCONTINUED | OUTPATIENT
Start: 2018-12-18 | End: 2018-12-18 | Stop reason: HOSPADM

## 2018-12-18 RX ORDER — METOCLOPRAMIDE HYDROCHLORIDE 5 MG/ML
5 INJECTION INTRAMUSCULAR; INTRAVENOUS
Status: DISCONTINUED | OUTPATIENT
Start: 2018-12-18 | End: 2018-12-18 | Stop reason: HOSPADM

## 2018-12-18 RX ORDER — ROCURONIUM BROMIDE 10 MG/ML
INJECTION, SOLUTION INTRAVENOUS AS NEEDED
Status: DISCONTINUED | OUTPATIENT
Start: 2018-12-18 | End: 2018-12-18 | Stop reason: SURG

## 2018-12-18 RX ORDER — SODIUM CHLORIDE, SODIUM LACTATE, POTASSIUM CHLORIDE, CALCIUM CHLORIDE 600; 310; 30; 20 MG/100ML; MG/100ML; MG/100ML; MG/100ML
100 INJECTION, SOLUTION INTRAVENOUS CONTINUOUS
Status: DISCONTINUED | OUTPATIENT
Start: 2018-12-18 | End: 2018-12-18 | Stop reason: HOSPADM

## 2018-12-18 RX ORDER — SUCCINYLCHOLINE CHLORIDE 20 MG/ML
INJECTION INTRAMUSCULAR; INTRAVENOUS AS NEEDED
Status: DISCONTINUED | OUTPATIENT
Start: 2018-12-18 | End: 2018-12-18 | Stop reason: SURG

## 2018-12-18 RX ORDER — FENTANYL CITRATE 50 UG/ML
25 INJECTION, SOLUTION INTRAMUSCULAR; INTRAVENOUS AS NEEDED
Status: DISCONTINUED | OUTPATIENT
Start: 2018-12-18 | End: 2018-12-18 | Stop reason: HOSPADM

## 2018-12-18 RX ORDER — CEFAZOLIN SODIUM 1 G/3ML
INJECTION, POWDER, FOR SOLUTION INTRAMUSCULAR; INTRAVENOUS AS NEEDED
Status: DISCONTINUED | OUTPATIENT
Start: 2018-12-18 | End: 2018-12-18 | Stop reason: SURG

## 2018-12-18 RX ORDER — MIDAZOLAM HYDROCHLORIDE 1 MG/ML
1 INJECTION INTRAMUSCULAR; INTRAVENOUS
Status: DISCONTINUED | OUTPATIENT
Start: 2018-12-18 | End: 2018-12-18 | Stop reason: HOSPADM

## 2018-12-18 RX ORDER — OXYMETAZOLINE HYDROCHLORIDE 0.05 G/100ML
SPRAY NASAL AS NEEDED
Status: DISCONTINUED | OUTPATIENT
Start: 2018-12-18 | End: 2018-12-18 | Stop reason: HOSPADM

## 2018-12-18 RX ORDER — LIDOCAINE HYDROCHLORIDE AND EPINEPHRINE 10; 10 MG/ML; UG/ML
INJECTION, SOLUTION INFILTRATION; PERINEURAL AS NEEDED
Status: DISCONTINUED | OUTPATIENT
Start: 2018-12-18 | End: 2018-12-18 | Stop reason: HOSPADM

## 2018-12-18 RX ORDER — DEXAMETHASONE SODIUM PHOSPHATE 4 MG/ML
INJECTION, SOLUTION INTRA-ARTICULAR; INTRALESIONAL; INTRAMUSCULAR; INTRAVENOUS; SOFT TISSUE AS NEEDED
Status: DISCONTINUED | OUTPATIENT
Start: 2018-12-18 | End: 2018-12-18 | Stop reason: SURG

## 2018-12-18 RX ORDER — OXYMETAZOLINE HYDROCHLORIDE 0.05 G/100ML
2 SPRAY NASAL
Status: COMPLETED | OUTPATIENT
Start: 2018-12-18 | End: 2018-12-18

## 2018-12-18 RX ORDER — SODIUM CHLORIDE 0.9 % (FLUSH) 0.9 %
3 SYRINGE (ML) INJECTION EVERY 12 HOURS SCHEDULED
Status: DISCONTINUED | OUTPATIENT
Start: 2018-12-18 | End: 2018-12-18 | Stop reason: HOSPADM

## 2018-12-18 RX ORDER — PROPOFOL 10 MG/ML
VIAL (ML) INTRAVENOUS AS NEEDED
Status: DISCONTINUED | OUTPATIENT
Start: 2018-12-18 | End: 2018-12-18 | Stop reason: SURG

## 2018-12-18 RX ORDER — SODIUM CHLORIDE 0.9 % (FLUSH) 0.9 %
3 SYRINGE (ML) INJECTION AS NEEDED
Status: DISCONTINUED | OUTPATIENT
Start: 2018-12-18 | End: 2018-12-18 | Stop reason: HOSPADM

## 2018-12-18 RX ORDER — SODIUM CHLORIDE, SODIUM LACTATE, POTASSIUM CHLORIDE, CALCIUM CHLORIDE 600; 310; 30; 20 MG/100ML; MG/100ML; MG/100ML; MG/100ML
1000 INJECTION, SOLUTION INTRAVENOUS CONTINUOUS
Status: DISCONTINUED | OUTPATIENT
Start: 2018-12-18 | End: 2018-12-18 | Stop reason: HOSPADM

## 2018-12-18 RX ORDER — ONDANSETRON 2 MG/ML
4 INJECTION INTRAMUSCULAR; INTRAVENOUS ONCE AS NEEDED
Status: DISCONTINUED | OUTPATIENT
Start: 2018-12-18 | End: 2018-12-18 | Stop reason: HOSPADM

## 2018-12-18 RX ADMIN — OXYMETAZOLINE HYDROCHLORIDE 2 SPRAY: 5 SPRAY NASAL at 07:36

## 2018-12-18 RX ADMIN — SCOPALAMINE 1 PATCH: 1 PATCH, EXTENDED RELEASE TRANSDERMAL at 07:24

## 2018-12-18 RX ADMIN — LIDOCAINE HYDROCHLORIDE 0.5 ML: 10 INJECTION, SOLUTION EPIDURAL; INFILTRATION; INTRACAUDAL; PERINEURAL at 06:57

## 2018-12-18 RX ADMIN — PROPOFOL 150 MG: 10 INJECTION, EMULSION INTRAVENOUS at 07:59

## 2018-12-18 RX ADMIN — LIDOCAINE HYDROCHLORIDE 50 MG: 20 INJECTION, SOLUTION INFILTRATION; PERINEURAL at 07:59

## 2018-12-18 RX ADMIN — SODIUM CHLORIDE, POTASSIUM CHLORIDE, SODIUM LACTATE AND CALCIUM CHLORIDE 1000 ML: 600; 310; 30; 20 INJECTION, SOLUTION INTRAVENOUS at 06:57

## 2018-12-18 RX ADMIN — SUCCINYLCHOLINE CHLORIDE 100 MG: 20 INJECTION, SOLUTION INTRAMUSCULAR; INTRAVENOUS at 07:59

## 2018-12-18 RX ADMIN — LIDOCAINE HYDROCHLORIDE 1 EACH: 40 SOLUTION TOPICAL at 07:59

## 2018-12-18 RX ADMIN — DEXAMETHASONE SODIUM PHOSPHATE 4 MG: 4 INJECTION, SOLUTION INTRA-ARTICULAR; INTRALESIONAL; INTRAMUSCULAR; INTRAVENOUS; SOFT TISSUE at 07:25

## 2018-12-18 RX ADMIN — SODIUM CHLORIDE, POTASSIUM CHLORIDE, SODIUM LACTATE AND CALCIUM CHLORIDE: 600; 310; 30; 20 INJECTION, SOLUTION INTRAVENOUS at 08:30

## 2018-12-18 RX ADMIN — ROCURONIUM BROMIDE 5 MG: 10 INJECTION INTRAVENOUS at 07:59

## 2018-12-18 RX ADMIN — DEXAMETHASONE SODIUM PHOSPHATE 4 MG: 4 INJECTION, SOLUTION INTRAMUSCULAR; INTRAVENOUS at 08:14

## 2018-12-18 RX ADMIN — FAMOTIDINE 20 MG: 10 INJECTION, SOLUTION INTRAVENOUS at 07:24

## 2018-12-18 RX ADMIN — CEFAZOLIN 1 G: 1 INJECTION, POWDER, FOR SOLUTION INTRAVENOUS at 08:01

## 2018-12-18 RX ADMIN — ONDANSETRON HYDROCHLORIDE 4 MG: 2 SOLUTION INTRAMUSCULAR; INTRAVENOUS at 08:14

## 2018-12-18 NOTE — ANESTHESIA PROCEDURE NOTES
ANESTHESIA INTUBATION  Urgency: elective    Airway not difficult    General Information and Staff    Patient location during procedure: OR  CRNA: BIPIN Sánchze CRNA    Indications and Patient Condition  Indications for airway management: airway protection    Preoxygenated: yes  MILS maintained throughout  Mask difficulty assessment: 1 - vent by mask    Final Airway Details  Final airway type: endotracheal airway      Successful airway: ETT and ALEX tube  Cuffed: yes   Successful intubation technique: direct laryngoscopy  Facilitating devices/methods: intubating stylet  Endotracheal tube insertion site: oral  Blade: Grant  Blade size: 3  ETT size (mm): 7.0  Cormack-Lehane Classification: grade I - full view of glottis  Placement verified by: chest auscultation and capnometry   Cuff volume (mL): 5  Measured from: lips  ETT to lips (cm): 20  Number of attempts at approach: 1

## 2018-12-18 NOTE — DISCHARGE INSTRUCTIONS

## 2018-12-18 NOTE — OP NOTE
Preprocedure diagnosis:     1) Closed nasal fracture    2) Closed nasal septal fracture    Post procedure diagnosis:     1) Closed nasal fracture    2) Closed nasal septal fracture      Procedure performed: 1) closed nasal reduction with stabilization   2) open nasal septal fracture repair    Surgeon: Daryrl Benites M.D.    Anesthesia: General    IV fluids: Per anesthesia    Estimated blood loss: 15cc    Drains: None    Disposition: PACU    Complications: None    Specimens: None    Operative findings: The nasal pyramid was shifted to the left with the left nasal bone lateralized and the right nasal bone medialized.  The nasal septum was fractured and deflected to the patient's right dorsal aspect.  On the ventral aspect, the septum was off the maxillary crest on the left nasal floor.    Details: after patient verification and consent material was reviewed, the patient was taken to the operating room and laid supine on the operative table.  A formal timeout procedure was performed after the induction of general endotracheal anesthesia.    The patient was draped.  I placed pledgets soaked in Afrin intranasally for approximately 2 minutes.  The pledgets were removed.      The Hale elevator was placed into the patient's right nasal cavity,where anterior and lateral pressure was placed on the nasal bone, effectively lateralizing this.  At the same time I placed digital pressure and medial direction on the left nasal bone, effectively reducing this.    Examination demonstrated excellent reduction.    Intranasal examination demonstrated septal deviation to the right dorsally; to the left ventrally.  A caudal elevator was used to gently palpate the septum and this was noted be mobile.  Then used a 15 blade and made a left sided hemitransfixion incision and elevated bilateral submucoperichondrial flaps utilizing the caudal elevator.  The septum was deflected off onto the floor in the left nasal cavity was off the  maxillary crest.  This was dissected free from the surrounding tissue.  The tissue was dissected off the maxillary crest and I placed the septum back on the maxillary crest removing a small amount to allow a swinging-door  technique.  I then placed a figure of 8 of 3-0 chromic suture to fix the septum onto the maxillary crest.  I then closed the hemitransfixion incision using a running, locking 3-0 chromic suture.  Then used a quilting stitch of 4-0 chromic suture to reapproximate the mucosal flaps.    A thermoplastic splint consisting of Mastisol, Steri-Strips,the foam strip, a medium thermoplastic splint, Steri-Strips, and brown paper tape was placed.next    This marks the conclusion of the procedure the patient was weaned from anesthesia and transferred to the PACU for recovery without complication

## 2018-12-18 NOTE — ANESTHESIA POSTPROCEDURE EVALUATION
Patient: Gloria Case    Procedure Summary     Date:  12/18/18 Room / Location:  [facility] OR  /  [facility] OR    Anesthesia Start:  0756 Anesthesia Stop:  0842    Procedure:  Closed reduction of nasal fracture and repair of nasal septal fracture (N/A Nose) Diagnosis:       Closed fracture of nasal bone, initial encounter      Closed fracture of nasal septum, initial encounter      (Closed fracture of nasal bone, initial encounter [S02.2XXA])      (Closed fracture of nasal septum, initial encounter [S02.2XXA])    Surgeon:  Darryl Benites MD Provider:  BIPIN Sánchez CRNA    Anesthesia Type:  general ASA Status:  2          Anesthesia Type: general  Last vitals  BP   119/70 (12/18/18 1115)   Temp   98.6 °F (37 °C) (12/18/18 0940)   Pulse   68 (12/18/18 1115)   Resp   16 (12/18/18 1115)     SpO2   95 % (12/18/18 1115)     Post Anesthesia Care and Evaluation    Patient location during evaluation: PACU  Patient participation: complete - patient participated  Level of consciousness: awake and alert  Pain management: adequate  Airway patency: patent  Anesthetic complications: No anesthetic complications    Cardiovascular status: acceptable  Respiratory status: acceptable  Hydration status: acceptable    Comments: Blood pressure 119/70, pulse 68, temperature 98.6 °F (37 °C), temperature source Temporal, resp. rate 16, SpO2 95 %.    Pt discharged from PACU based on bigg score >8
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
## 2018-12-19 ENCOUNTER — TELEPHONE (OUTPATIENT)
Dept: OTOLARYNGOLOGY | Facility: CLINIC | Age: 63
End: 2018-12-19

## 2018-12-19 NOTE — TELEPHONE ENCOUNTER
Postop Phone Call:    No answer.  I did not leave message (no personal mailbox address)    Darryl Benites MD

## 2018-12-20 ENCOUNTER — TELEPHONE (OUTPATIENT)
Dept: OTOLARYNGOLOGY | Facility: CLINIC | Age: 63
End: 2018-12-20

## 2018-12-20 NOTE — TELEPHONE ENCOUNTER
Patient stopped by the office today. She states that she had missed a call from our office. I told her Dr. Benites had tried to reach her to see how she was doing after surgery. Patient requested I change the tape on her dressing. She had removed the brown paper tape that was placed after surgery and had replaced it with clear transpore tape and gauze over splint. Patient states the transpore tape was very irritating to her skin. I gently removed the transpore tape and used brown paper tape. I instructed patient to leave splint and dressing intact and provided her with additional tape to reinforce if needed.  Patient states that her kitten jumped up and hit her nose last night and she had a slight amount of bleeding after. Denies nose bleed today.   Patient said the pain medication that was given was not strong enough and ask if Dr. Benites could write a new Rx for something stronger. I advised that Dr. Benites is not in the office today, and we could not give her anything stronger for pain than what was written. Instructed to keep post op appointment on 12/26/2018.

## 2018-12-26 ENCOUNTER — OFFICE VISIT (OUTPATIENT)
Dept: OTOLARYNGOLOGY | Facility: CLINIC | Age: 63
End: 2018-12-26

## 2018-12-26 VITALS
SYSTOLIC BLOOD PRESSURE: 119 MMHG | HEIGHT: 65 IN | HEART RATE: 80 BPM | RESPIRATION RATE: 20 BRPM | TEMPERATURE: 98 F | BODY MASS INDEX: 22.33 KG/M2 | WEIGHT: 134 LBS | DIASTOLIC BLOOD PRESSURE: 78 MMHG

## 2018-12-26 DIAGNOSIS — S02.2XXA CLOSED FRACTURE OF NASAL BONE, INITIAL ENCOUNTER: Primary | ICD-10-CM

## 2018-12-26 DIAGNOSIS — Z72.0 TOBACCO ABUSE DISORDER: ICD-10-CM

## 2018-12-26 PROCEDURE — 99024 POSTOP FOLLOW-UP VISIT: CPT | Performed by: OTOLARYNGOLOGY
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
## 2019-05-12 ENCOUNTER — APPOINTMENT (OUTPATIENT)
Dept: GENERAL RADIOLOGY | Facility: HOSPITAL | Age: 64
End: 2019-05-12

## 2019-05-12 ENCOUNTER — HOSPITAL ENCOUNTER (EMERGENCY)
Facility: HOSPITAL | Age: 64
Discharge: HOME | End: 2019-05-12
Attending: EMERGENCY MEDICINE | Admitting: EMERGENCY MEDICINE

## 2019-05-12 VITALS
SYSTOLIC BLOOD PRESSURE: 105 MMHG | TEMPERATURE: 98.4 F | RESPIRATION RATE: 16 BRPM | OXYGEN SATURATION: 97 % | BODY MASS INDEX: 20.96 KG/M2 | HEART RATE: 84 BPM | HEIGHT: 65 IN | WEIGHT: 125.8 LBS | DIASTOLIC BLOOD PRESSURE: 59 MMHG

## 2019-05-12 DIAGNOSIS — M54.89 BACK PAIN WITHOUT SCIATICA: Primary | ICD-10-CM

## 2019-05-12 DIAGNOSIS — N20.0 RENAL STONE: ICD-10-CM
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
## 2019-05-12 DIAGNOSIS — R07.9 CHEST PAIN, UNSPECIFIED TYPE: ICD-10-CM

## 2019-05-12 LAB
A-A DO2: ABNORMAL MMHG
A-A DO2: ABNORMAL MMHG
ALBUMIN SERPL-MCNC: 5 G/DL (ref 3.5–5)
ALBUMIN/GLOB SERPL: 1.9 G/DL (ref 1.1–2.5)
ALP SERPL-CCNC: 84 U/L (ref 24–120)
ALT SERPL W P-5'-P-CCNC: 16 U/L (ref 0–54)
ANION GAP SERPL CALCULATED.3IONS-SCNC: 11 MMOL/L (ref 4–13)
APAP SERPL-MCNC: <10 MCG/ML (ref 10–30)
ARTERIAL PATENCY WRIST A: ABNORMAL
ARTERIAL PATENCY WRIST A: ABNORMAL
AST SERPL-CCNC: 34 U/L (ref 7–45)
ATMOSPHERIC PRESS: 746 MMHG
ATMOSPHERIC PRESS: 746 MMHG
BASE EXCESS BLDA CALC-SCNC: -1.1 MMOL/L (ref 0–2)
BASE EXCESS BLDA CALC-SCNC: 0.6 MMOL/L (ref 0–2)
BASOPHILS # BLD AUTO: 0.04 10*3/MM3 (ref 0–0.2)
BASOPHILS NFR BLD AUTO: 0.6 % (ref 0–2)
BDY SITE: ABNORMAL
BDY SITE: ABNORMAL
BILIRUB SERPL-MCNC: 0.6 MG/DL (ref 0.1–1)
BODY TEMPERATURE: 37 C
BODY TEMPERATURE: 37 C
BUN BLD-MCNC: 25 MG/DL (ref 5–21)
BUN/CREAT SERPL: 25.5 (ref 7–25)
CALCIUM SPEC-SCNC: 9.3 MG/DL (ref 8.4–10.4)
CHLORIDE SERPL-SCNC: 103 MMOL/L (ref 98–110)
CO2 SERPL-SCNC: 27 MMOL/L (ref 24–31)
COHGB MFR BLD: 1.6 % (ref 0–5)
COHGB MFR BLD: 5.1 % (ref 0–5)
CREAT BLD-MCNC: 0.98 MG/DL (ref 0.5–1.4)
D DIMER PPP FEU-MCNC: 0.27 MG/L (FEU) (ref 0–0.5)
DEPRECATED RDW RBC AUTO: 41.9 FL (ref 40–54)
EOSINOPHIL # BLD AUTO: 0.03 10*3/MM3 (ref 0–0.7)
EOSINOPHIL NFR BLD AUTO: 0.4 % (ref 0–4)
ERYTHROCYTE [DISTWIDTH] IN BLOOD BY AUTOMATED COUNT: 13.3 % (ref 12–15)
ETHANOL UR QL: <0.01 %
GAS FLOW AIRWAY: 12 LPM
GFR SERPL CREATININE-BSD FRML MDRD: 58 ML/MIN/1.73
GLOBULIN UR ELPH-MCNC: 2.6 GM/DL
GLUCOSE BLD-MCNC: 81 MG/DL (ref 70–100)
HCO3 BLDA-SCNC: 23.6 MMOL/L (ref 20–26)
HCO3 BLDA-SCNC: 25.9 MMOL/L (ref 20–26)
HCT VFR BLD AUTO: 42.6 % (ref 37–47)
HCT VFR BLD CALC: 41.8 % (ref 38–51)
HCT VFR BLD CALC: 42.6 % (ref 38–51)
HGB BLD-MCNC: 14.7 G/DL (ref 12–16)
HGB BLDA-MCNC: 13.6 G/DL (ref 12–16)
HGB BLDA-MCNC: 13.9 G/DL (ref 12–16)
IMM GRANULOCYTES # BLD AUTO: 0.01 10*3/MM3 (ref 0–0.05)
IMM GRANULOCYTES NFR BLD AUTO: 0.1 % (ref 0–5)
LYMPHOCYTES # BLD AUTO: 2.04 10*3/MM3 (ref 0.72–4.86)
LYMPHOCYTES NFR BLD AUTO: 30.4 % (ref 15–45)
Lab: ABNORMAL
Lab: ABNORMAL
MCH RBC QN AUTO: 29.8 PG (ref 28–32)
MCHC RBC AUTO-ENTMCNC: 34.5 G/DL (ref 33–36)
MCV RBC AUTO: 86.2 FL (ref 82–98)
METHGB BLD QL: 0.8 % (ref 0–3)
METHGB BLD QL: 0.9 % (ref 0–3)
MODALITY: ABNORMAL
MODALITY: ABNORMAL
MONOCYTES # BLD AUTO: 0.38 10*3/MM3 (ref 0.19–1.3)
MONOCYTES NFR BLD AUTO: 5.7 % (ref 4–12)
NEUTROPHILS # BLD AUTO: 4.22 10*3/MM3 (ref 1.87–8.4)
NEUTROPHILS NFR BLD AUTO: 62.8 % (ref 39–78)
NOTE: ABNORMAL
NOTE: ABNORMAL
NRBC BLD AUTO-RTO: 0 /100 WBC (ref 0–0.2)
OXYHGB MFR BLDV: 90.6 % (ref 94–99)
OXYHGB MFR BLDV: 97.5 % (ref 94–99)
PCO2 BLDA: 38.8 MM HG (ref 35–45)
PCO2 BLDA: 42.7 MM HG (ref 35–45)
PCO2 TEMP ADJ BLD: ABNORMAL MM HG (ref 35–45)
PCO2 TEMP ADJ BLD: ABNORMAL MM HG (ref 35–45)
PH BLDA: 7.39 PH UNITS (ref 7.35–7.45)
PH BLDA: 7.39 PH UNITS (ref 7.35–7.45)
PH, TEMP CORRECTED: ABNORMAL PH UNITS (ref 7.35–7.45)
PH, TEMP CORRECTED: ABNORMAL PH UNITS (ref 7.35–7.45)
PLATELET # BLD AUTO: 339 10*3/MM3 (ref 130–400)
PMV BLD AUTO: 10 FL (ref 6–12)
PO2 BLDA: 300 MM HG (ref 83–108)
PO2 BLDA: 73.1 MM HG (ref 83–108)
PO2 TEMP ADJ BLD: ABNORMAL MM HG (ref 83–108)
PO2 TEMP ADJ BLD: ABNORMAL MM HG (ref 83–108)
POTASSIUM BLD-SCNC: 4.2 MMOL/L (ref 3.5–5.3)
POTASSIUM BLDA-SCNC: 3.7 MMOL/L (ref 3.5–5.2)
POTASSIUM BLDA-SCNC: 4.1 MMOL/L (ref 3.5–5.2)
PROT SERPL-MCNC: 7.6 G/DL (ref 6.3–8.7)
RBC # BLD AUTO: 4.94 10*6/MM3 (ref 4.2–5.4)
SAO2 % BLDCOA: 96.3 % (ref 94–99)
SAO2 % BLDCOA: >100.1 % (ref 94–99)
SODIUM BLD-SCNC: 141 MMOL/L (ref 135–145)
SODIUM BLDA-SCNC: 140 MMOL/L (ref 136–145)
SODIUM BLDA-SCNC: 141 MMOL/L (ref 136–145)
TROPONIN I SERPL-MCNC: <0.012 NG/ML (ref 0–0.03)
TROPONIN I SERPL-MCNC: <0.012 NG/ML (ref 0–0.03)
VENTILATOR MODE: ABNORMAL
VENTILATOR MODE: ABNORMAL
WBC NRBC COR # BLD: 6.72 10*3/MM3 (ref 4.8–10.8)

## 2019-05-12 PROCEDURE — 36600 WITHDRAWAL OF ARTERIAL BLOOD: CPT

## 2019-05-12 PROCEDURE — 83050 HGB METHEMOGLOBIN QUAN: CPT

## 2019-05-12 PROCEDURE — 85379 FIBRIN DEGRADATION QUANT: CPT | Performed by: PHYSICIAN ASSISTANT

## 2019-05-12 PROCEDURE — 80307 DRUG TEST PRSMV CHEM ANLYZR: CPT | Performed by: PHYSICIAN ASSISTANT

## 2019-05-12 PROCEDURE — 93010 ELECTROCARDIOGRAM REPORT: CPT | Performed by: INTERNAL MEDICINE

## 2019-05-12 PROCEDURE — 71045 X-RAY EXAM CHEST 1 VIEW: CPT

## 2019-05-12 PROCEDURE — 82805 BLOOD GASES W/O2 SATURATION: CPT

## 2019-05-12 PROCEDURE — 82375 ASSAY CARBOXYHB QUANT: CPT

## 2019-05-12 PROCEDURE — 80053 COMPREHEN METABOLIC PANEL: CPT | Performed by: PHYSICIAN ASSISTANT

## 2019-05-12 PROCEDURE — 84484 ASSAY OF TROPONIN QUANT: CPT | Performed by: PHYSICIAN ASSISTANT

## 2019-05-12 PROCEDURE — 72072 X-RAY EXAM THORAC SPINE 3VWS: CPT

## 2019-05-12 PROCEDURE — 72110 X-RAY EXAM L-2 SPINE 4/>VWS: CPT

## 2019-05-12 PROCEDURE — 93005 ELECTROCARDIOGRAM TRACING: CPT | Performed by: PHYSICIAN ASSISTANT

## 2019-05-12 PROCEDURE — 99284 EMERGENCY DEPT VISIT MOD MDM: CPT

## 2019-05-12 PROCEDURE — 85025 COMPLETE CBC W/AUTO DIFF WBC: CPT | Performed by: PHYSICIAN ASSISTANT

## 2019-05-12 PROCEDURE — 93005 ELECTROCARDIOGRAM TRACING: CPT | Performed by: EMERGENCY MEDICINE

## 2019-05-12 RX ORDER — SODIUM CHLORIDE 0.9 % (FLUSH) 0.9 %
10 SYRINGE (ML) INJECTION AS NEEDED
Status: DISCONTINUED | OUTPATIENT
Start: 2019-05-12 | End: 2019-05-12 | Stop reason: HOSPADM

## 2019-05-12 NOTE — DISCHARGE INSTRUCTIONS
Follow up with one of the [facility] physician groups below to setup primary care. If you have trouble making an appointment, please call the [facility] Nurse Line at [phone]    Dr. Jermaine Porras DO, Dr. Vinny Phillips DO,  NAHUM Rodríguez, and NAHUM Lai  [facility] - [address], [address], [place], [place] [address] [phone]     Dr. Joes Luis Taylor MD  [facility] - [address], [address], [place], [place] [address] [phone]    Dr. Phuong Uribe MD, and NAHUM Peralta  [facility] [address], [place], [place] [address] [phone]    Dr. Adolph Trent MD and Dr. Ashu Rodriguez MD  [facility] [address], [address]  [phone]    Dr. Pako Lees MD  [facility] [place]  [address] [facility] [address] [address], [place], [place], [address] [phone]    Dr. Du Albarran MD  [facility] [place]  [address] [place], [place], [address] [phone]
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
## 2019-05-12 NOTE — ED NOTES
Ambulated pt on RA around CP area.  Pt tolerated.  HR 80's.  O2 >95%.  Renetta SIDHU notified.     Nasim Roque RN  05/12/19 3439
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
## 2019-06-08 ENCOUNTER — HOSPITAL ENCOUNTER (EMERGENCY)
Facility: HOSPITAL | Age: 64
Discharge: HOME | End: 2019-06-08
Attending: EMERGENCY MEDICINE | Admitting: EMERGENCY MEDICINE

## 2019-06-08 ENCOUNTER — APPOINTMENT (OUTPATIENT)
Dept: CT IMAGING | Facility: HOSPITAL | Age: 64
End: 2019-06-08

## 2019-06-08 VITALS
HEART RATE: 72 BPM | WEIGHT: 133.6 LBS | TEMPERATURE: 97.5 F | BODY MASS INDEX: 22.26 KG/M2 | RESPIRATION RATE: 15 BRPM | HEIGHT: 65 IN | OXYGEN SATURATION: 95 % | SYSTOLIC BLOOD PRESSURE: 122 MMHG | DIASTOLIC BLOOD PRESSURE: 65 MMHG

## 2019-06-08 DIAGNOSIS — K80.20 CALCULUS OF GALLBLADDER WITHOUT CHOLECYSTITIS WITHOUT OBSTRUCTION: ICD-10-CM

## 2019-06-08 DIAGNOSIS — M19.90 OSTEOARTHRITIS, UNSPECIFIED OSTEOARTHRITIS TYPE, UNSPECIFIED SITE: Primary | ICD-10-CM

## 2019-06-08 DIAGNOSIS — N83.201 CYST OF RIGHT OVARY: ICD-10-CM

## 2019-06-08 LAB
ANION GAP SERPL CALCULATED.3IONS-SCNC: 3 MMOL/L (ref 4–13)
BASOPHILS # BLD AUTO: 0.05 10*3/MM3 (ref 0–0.2)
BASOPHILS NFR BLD AUTO: 0.7 % (ref 0–2)
BILIRUB UR QL STRIP: NEGATIVE
BUN BLD-MCNC: 18 MG/DL (ref 5–21)
BUN/CREAT SERPL: 22.5 (ref 7–25)
CALCIUM SPEC-SCNC: 8.8 MG/DL (ref 8.4–10.4)
CHLORIDE SERPL-SCNC: 107 MMOL/L (ref 98–110)
CLARITY UR: CLEAR
CO2 SERPL-SCNC: 29 MMOL/L (ref 24–31)
COLOR UR: ABNORMAL
CREAT BLD-MCNC: 0.8 MG/DL (ref 0.5–1.4)
DEPRECATED RDW RBC AUTO: 41.6 FL (ref 40–54)
EOSINOPHIL # BLD AUTO: 0.16 10*3/MM3 (ref 0–0.7)
EOSINOPHIL NFR BLD AUTO: 2.3 % (ref 0–4)
ERYTHROCYTE [DISTWIDTH] IN BLOOD BY AUTOMATED COUNT: 13.3 % (ref 12–15)
GFR SERPL CREATININE-BSD FRML MDRD: 73 ML/MIN/1.73
GLUCOSE BLD-MCNC: 96 MG/DL (ref 70–100)
GLUCOSE UR STRIP-MCNC: NEGATIVE MG/DL
HCT VFR BLD AUTO: 36.5 % (ref 37–47)
HGB BLD-MCNC: 12.3 G/DL (ref 12–16)
HGB UR QL STRIP.AUTO: NEGATIVE
HOLD SPECIMEN: NORMAL
IMM GRANULOCYTES # BLD AUTO: 0.02 10*3/MM3 (ref 0–0.05)
IMM GRANULOCYTES NFR BLD AUTO: 0.3 % (ref 0–5)
KETONES UR QL STRIP: ABNORMAL
LEUKOCYTE ESTERASE UR QL STRIP.AUTO: NEGATIVE
LYMPHOCYTES # BLD AUTO: 2.87 10*3/MM3 (ref 0.72–4.86)
LYMPHOCYTES NFR BLD AUTO: 41.2 % (ref 15–45)
MCH RBC QN AUTO: 28.9 PG (ref 28–32)
MCHC RBC AUTO-ENTMCNC: 33.7 G/DL (ref 33–36)
MCV RBC AUTO: 85.7 FL (ref 82–98)
MONOCYTES # BLD AUTO: 0.37 10*3/MM3 (ref 0.19–1.3)
MONOCYTES NFR BLD AUTO: 5.3 % (ref 4–12)
NEUTROPHILS # BLD AUTO: 3.49 10*3/MM3 (ref 1.87–8.4)
NEUTROPHILS NFR BLD AUTO: 50.2 % (ref 39–78)
NITRITE UR QL STRIP: NEGATIVE
NRBC BLD AUTO-RTO: 0 /100 WBC (ref 0–0.2)
PH UR STRIP.AUTO: <=5 [PH] (ref 5–8)
PLATELET # BLD AUTO: 320 10*3/MM3 (ref 130–400)
PMV BLD AUTO: 9.9 FL (ref 6–12)
POTASSIUM BLD-SCNC: 4.1 MMOL/L (ref 3.5–5.3)
PROT UR QL STRIP: ABNORMAL
RBC # BLD AUTO: 4.26 10*6/MM3 (ref 4.2–5.4)
SODIUM BLD-SCNC: 139 MMOL/L (ref 135–145)
SP GR UR STRIP: >1.03 (ref 1–1.03)
UROBILINOGEN UR QL STRIP: ABNORMAL
WBC NRBC COR # BLD: 6.96 10*3/MM3 (ref 4.8–10.8)

## 2019-06-08 PROCEDURE — 96375 TX/PRO/DX INJ NEW DRUG ADDON: CPT

## 2019-06-08 PROCEDURE — 74176 CT ABD & PELVIS W/O CONTRAST: CPT

## 2019-06-08 PROCEDURE — 96374 THER/PROPH/DIAG INJ IV PUSH: CPT

## 2019-06-08 PROCEDURE — 85025 COMPLETE CBC W/AUTO DIFF WBC: CPT | Performed by: EMERGENCY MEDICINE

## 2019-06-08 PROCEDURE — 72131 CT LUMBAR SPINE W/O DYE: CPT

## 2019-06-08 PROCEDURE — 25010000002 ONDANSETRON PER 1 MG: Performed by: EMERGENCY MEDICINE

## 2019-06-08 PROCEDURE — 81003 URINALYSIS AUTO W/O SCOPE: CPT | Performed by: EMERGENCY MEDICINE

## 2019-06-08 PROCEDURE — 25010000002 MORPHINE PER 10 MG: Performed by: EMERGENCY MEDICINE

## 2019-06-08 PROCEDURE — 99283 EMERGENCY DEPT VISIT LOW MDM: CPT

## 2019-06-08 PROCEDURE — 80048 BASIC METABOLIC PNL TOTAL CA: CPT | Performed by: EMERGENCY MEDICINE

## 2019-06-08 RX ORDER — ONDANSETRON 2 MG/ML
4 INJECTION INTRAMUSCULAR; INTRAVENOUS ONCE
Status: COMPLETED | OUTPATIENT
Start: 2019-06-08 | End: 2019-06-08

## 2019-06-08 RX ORDER — OXYCODONE HYDROCHLORIDE AND ACETAMINOPHEN 5; 325 MG/1; MG/1
1 TABLET ORAL EVERY 4 HOURS PRN
Qty: 10 TABLET | Refills: 0 | Status: SHIPPED | OUTPATIENT
Start: 2019-06-08 | End: 2021-01-18

## 2019-06-08 RX ADMIN — ONDANSETRON 4 MG: 2 INJECTION, SOLUTION INTRAMUSCULAR; INTRAVENOUS at 21:26

## 2019-06-08 RX ADMIN — MORPHINE SULFATE 4 MG: 4 INJECTION INTRAVENOUS at 21:26
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
## 2019-06-09 NOTE — DISCHARGE INSTRUCTIONS
Follow up with one of the [facility] physician groups below to setup primary care. If you have trouble making an appointment, please call the [facility] Nurse Line at [phone]    Dr. Jermaine Porras DO, Dr. Vinny Phillips DO,  NAHUM Rodríguez, and NAHUM Lai  [facility] - [address], [place], [place] [id number] [phone]     Dr. Jose Luis Taylor MD  [facility] - [address], [address], [place], [place] [id number] [phone]    Dr. Phuong Uribe MD, and NAHUM Peralta  [facility] [address], [place], [place] [address] [phone]    Dr. Adolph Trent MD and Dr. Ashu Rodriguez MD  [address], [address]  [phone]    Dr. Pako Lees MD  [facility]  [address] [place] [address], [facility], [place], [place], [address] [phone]    Dr. Du Albarran MD  [facility]  [address], [place], [address] [phone]              Arthritis means joint pain. It can also mean joint disease. A joint is a place where bones come together. People who have arthritis may have:  · Red joints.  · Swollen joints.  · Stiff joints.  · Warm joints.  · A fever.  · A feeling of being sick.    Follow these instructions at home:  Pay attention to any changes in your symptoms. Take these actions to help with your pain and swelling.  Medicines  · Take over-the-counter and prescription medicines only as told by your doctor.  · Do not take aspirin for pain if your doctor says that you may have gout.  Activity  · Rest your joint if your doctor tells you to.  · Avoid activities that make the pain worse.  · Exercise your joint regularly as told by  your doctor. Try doing exercises like:  ? Swimming.  ? Water aerobics.  ? Biking.  ? Walking.  Joint Care    · If your joint is swollen, keep it raised (elevated) if told by your doctor.  · If your joint feels stiff in the morning, try taking a warm shower.  · If you have diabetes, do not apply heat without asking your doctor.  · If told, apply heat to the joint:  ? Put a towel between the joint and the hot pack or heating pad.  ? Leave the heat on the area for 20-30 minutes.  · If told, apply ice to the joint:  ? Put ice in a plastic bag.  ? Place a towel between your skin and the bag.  ? Leave the ice on for 20 minutes, 2-3 times per day.  · Keep all follow-up visits as told by your doctor.  Contact a doctor if:  · The pain gets worse.  · You have a fever.  Get help right away if:  · You have very bad pain in your joint.  · You have swelling in your joint.  · Your joint is red.  · Many joints become painful and swollen.  · You have very bad back pain.  · Your leg is very weak.  · You cannot control your pee (urine) or poop (stool).  This information is not intended to replace advice given to you by your health care provider. Make sure you discuss any questions you have with your health care provider.  Document Released: 03/14/2011 Document Revised: 05/25/2017 Document Reviewed: 03/14/2016  [employer] Interactive Patient Education © 2019 [employer]

## 2019-06-09 NOTE — ED NOTES
Pt is in room sleeping at this time. Pt does not appear to be in any distress or discomfort.        Inderjit Lima RN  06/08/19 2027

## 2020-01-02 ENCOUNTER — NURSE TRIAGE (OUTPATIENT)
Dept: CALL CENTER | Facility: HOSPITAL | Age: 65
End: 2020-01-02
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
## 2020-02-07 ENCOUNTER — NURSE TRIAGE (OUTPATIENT)
Dept: CALL CENTER | Facility: HOSPITAL | Age: 65
End: 2020-02-07

## 2020-02-08 NOTE — TELEPHONE ENCOUNTER
Caller wants her to go to the hospital, patient does not want to be seen.  Advised clinics times and Ortho clinic times and that I did feel she needs to be examined.    Reason for Disposition  • [1] MODERATE back pain (e.g., interferes with normal activities) AND [2] present > 3 days    Additional Information  • Negative: Passed out (i.e., lost consciousness, collapsed and was not responding)  • Negative: Shock suspected (e.g., cold/pale/clammy skin, too weak to stand, low BP, rapid pulse)  • Negative: Sounds like a life-threatening emergency to the triager  • Negative: Major injury to the back (e.g., MVA, fall > 10 feet or 3 meters, penetrating injury, etc.)  • Negative: Followed a tailbone injury  • Negative: [1] Pain in the upper back over the ribs (rib cage) AND [2] radiates (travels, goes) into chest  • Negative: [1] Pain in the upper back over the ribs (rib cage) AND [2] worsened by coughing (or clearly increases with breathing)  • Negative: Back pain during pregnancy  • Negative: Pain mainly in flank (i.e., in the side, over the lower ribs or just below the ribs)  • Negative: [1] SEVERE back pain (e.g., excruciating) AND [2] sudden onset AND [3] age > 60  • Negative: [1] Unable to urinate (or only a few drops) > 4 hours AND [2] bladder feels very full (e.g., palpable bladder or strong urge to urinate)  • Negative: [1] Loss of bladder or bowel control (urine or bowel incontinence; wetting self, leaking stool) AND [2] new onset  • Negative: Numbness in groin or rectal area (i.e., loss of sensation)  • Negative: [1] SEVERE abdominal pain AND [2] present > 1 hour  • Negative: [1] Abdominal pain AND [2] age > 60  • Negative: Weakness of a leg or foot (e.g., unable to bear weight, dragging foot)  • Negative: Unable to walk  • Negative: Patient sounds very sick or weak to the triager  • Negative: [1] SEVERE back pain (e.g., excruciating, unable to do any normal activities) AND [2] not improved 2 hours after pain  "medicine  • Negative: [1] Pain radiates into the thigh or further down the leg AND [2] both legs  • Negative: [1] Fever > 100.0 F (37.8 C) AND [2] flank pain (i.e., in side, below ribs and above hip)  • Negative: [1] Pain or burning with passing urine (urination) AND [2] flank pain (i.e., in side, below ribs and above hip)  • Negative: Numbness in a leg or foot (i.e., loss of sensation)  • Negative: [1] Numbness in an arm or hand (i.e., loss of sensation) AND [2] upper back pain  • Negative: High-risk adult (e.g., history of cancer, HIV, or IV drug abuse)  • Negative: [1] Fever AND [2] no symptoms of UTI  (Exception: has generalized muscle pains, not localized back pain)  • Negative: Rash in same area as pain (may be described as \"small blisters\")  • Negative: Blood in urine (red, pink, or tea-colored)  • Negative: [1] Pain radiates into the thigh or further down the leg AND [2] one leg    Answer Assessment - Initial Assessment Questions  1. ONSET: \"When did the pain begin?\"       Over 6 weeks ago  2. LOCATION: \"Where does it hurt?\" (upper, mid or lower back)      Lower, mid and upper back, up to neck  3. SEVERITY: \"How bad is the pain?\"  (e.g., Scale 1-10; mild, moderate, or severe)    - MILD (1-3): doesn't interfere with normal activities     - MODERATE (4-7): interferes with normal activities or awakens from sleep     - SEVERE (8-10): excruciating pain, unable to do any normal activities       moderate  4. PATTERN: \"Is the pain constant?\" (e.g., yes, no; constant, intermittent)       intermittent  5. RADIATION: \"Does the pain shoot into your legs or elsewhere?\"      Radiates into legs and shooting pains up to neck and head  6. CAUSE:  \"What do you think is causing the back pain?\"       Fell in her kitchen over 6 weeks ago  7. BACK OVERUSE:  \"Any recent lifting of heavy objects, strenuous work or exercise?\"      denies  8. MEDICATIONS: \"What have you taken so far for the pain?\" (e.g., nothing, acetaminophen, " "NSAIDS)      ibuprofen  9. NEUROLOGIC SYMPTOMS: \"Do you have any weakness, numbness, or problems with bowel/bladder control?\"      weakness  10. OTHER SYMPTOMS: \"Do you have any other symptoms?\" (e.g., fever, abdominal pain, burning with urination, blood in urine)        denies  11. PREGNANCY: \"Is there any chance you are pregnant?\" (e.g., yes, no; LMP)        na    Protocols used: BACK PAIN-ADULT-AH      "

## 2020-05-22 ENCOUNTER — NURSE TRIAGE (OUTPATIENT)
Dept: CALL CENTER | Facility: HOSPITAL | Age: 65
End: 2020-05-22

## 2020-05-22 NOTE — TELEPHONE ENCOUNTER
"Thumb on right hand was cut with scissors last evening and is still bleeding, after home care of butterflies and bandages, suggested an urgent care or ED, caller state \" I will think about it\"    Reason for Disposition  • [1] Cut AND [2] finger joint can't be opened (straightened) or closed (bent) completely    Additional Information  • Negative: [1] Major bleeding (e.g., spurting blood) AND [2] can't be stopped  • Negative: Wound looks infected  • Negative: Caused by animal bite  • Negative: Caused by human bite  • Negative: Amputated finger  • Negative: [1] Bleeding AND [2] won't stop after 10 minutes of direct pressure (using correct technique)  • Negative: [1] Dirt in the wound AND [2] not removed with 15 minutes of scrubbing  • Negative: High pressure injection injury (e.g., from grease gun or paint gun, usually work-related)  • Negative: Looks like a broken bone (e.g., crooked or deformed)  • Negative: Looks like a dislocated joint (e.g., crooked or deformed)  • Negative: [1] Fingernail is partially torn AND [2] from crush injury  (Exception: torn nail from catching it on something)  • Negative: [1] Cut AND [2] numbness (loss of sensation) of finger  • Negative: Sounds like a serious injury to the triager  • Negative: Skin is split open or gaping (or length > 1/2 inch or 12 mm)  • Negative: [1] SEVERE pain AND [2] not improved 2 hours after pain medicine/ice packs  • Negative: [1] MODERATE-SEVERE pain AND [2] blood present under a nail  • Negative: Fingernail is completely torn off (fingernail avulsion)  • Negative: Base of fingernail has popped out of the skin fold (cuticle)    Answer Assessment - Initial Assessment Questions  1. MECHANISM: \"How did the injury happen?\"       Cut her right thumb with scissors and the laceration is still bleeding  2. ONSET: \"When did the injury happen?\" (Minutes or hours ago)       Last night  3. LOCATION: \"What part of the finger is injured?\" \"Is the nail damaged?\"       Right " "thumb  4. APPEARANCE of the INJURY: \"What does the injury look like?\"       Slightly  discolored  5. SEVERITY: \"Can you use the hand normally?\"  \"Can you bend your fingers into a ball and then fully open them?\"      unsure  6. SIZE: For cuts, bruises, or swelling, ask: \"How large is it?\" (e.g., inches or centimeters;  entire finger)       unsure  7. PAIN: \"Is there pain?\" If so, ask: \"How bad is the pain?\"    (e.g., Scale 1-10; or mild, moderate, severe)      no  8. TETANUS: For any breaks in the skin, ask: \"When was the last tetanus booster?\"      unsure  9. OTHER SYMPTOMS: \"Do you have any other symptoms?\"      Caller has steri striped the area  And bandage the area  10. PREGNANCY: \"Is there any chance you are pregnant?\" \"When was your last menstrual period?\"        no    Protocols used: FINGER INJURY-ADULT-      "

## 2020-07-12 ENCOUNTER — HOSPITAL ENCOUNTER (EMERGENCY)
Facility: HOSPITAL | Age: 65
Discharge: HOME | End: 2020-07-13
Attending: EMERGENCY MEDICINE | Admitting: EMERGENCY MEDICINE

## 2020-07-12 DIAGNOSIS — S10.93XA CONTUSION OF NECK, INITIAL ENCOUNTER: Primary | ICD-10-CM

## 2020-07-12 DIAGNOSIS — S20.229A CONTUSION OF BACK, UNSPECIFIED LATERALITY, INITIAL ENCOUNTER: ICD-10-CM

## 2020-07-12 PROCEDURE — 99284 EMERGENCY DEPT VISIT MOD MDM: CPT

## 2020-07-13 ENCOUNTER — APPOINTMENT (OUTPATIENT)
Dept: CT IMAGING | Facility: HOSPITAL | Age: 65
End: 2020-07-13

## 2020-07-13 VITALS
BODY MASS INDEX: 19.66 KG/M2 | HEIGHT: 65 IN | DIASTOLIC BLOOD PRESSURE: 67 MMHG | WEIGHT: 118 LBS | SYSTOLIC BLOOD PRESSURE: 101 MMHG | TEMPERATURE: 98.2 F | RESPIRATION RATE: 18 BRPM | OXYGEN SATURATION: 95 % | HEART RATE: 62 BPM

## 2020-07-13 PROCEDURE — 70450 CT HEAD/BRAIN W/O DYE: CPT

## 2020-07-13 PROCEDURE — 72125 CT NECK SPINE W/O DYE: CPT

## 2020-07-13 PROCEDURE — 25010000002 ONDANSETRON PER 1 MG: Performed by: EMERGENCY MEDICINE

## 2020-07-13 PROCEDURE — 25010000002 MORPHINE PER 10 MG: Performed by: EMERGENCY MEDICINE

## 2020-07-13 PROCEDURE — 72128 CT CHEST SPINE W/O DYE: CPT

## 2020-07-13 PROCEDURE — 72131 CT LUMBAR SPINE W/O DYE: CPT

## 2020-07-13 PROCEDURE — 96372 THER/PROPH/DIAG INJ SC/IM: CPT

## 2020-07-13 RX ORDER — PYRAZINAMIDE 500 MG/1
1 TABLET ORAL EVERY 4 HOURS PRN
Qty: 10 TABLET | Refills: 0 | Status: SHIPPED | OUTPATIENT
Start: 2020-07-13 | End: 2021-01-18

## 2020-07-13 RX ORDER — ONDANSETRON 2 MG/ML
4 INJECTION INTRAMUSCULAR; INTRAVENOUS ONCE
Status: DISCONTINUED | OUTPATIENT
Start: 2020-07-13 | End: 2020-07-13

## 2020-07-13 RX ORDER — ONDANSETRON 2 MG/ML
4 INJECTION INTRAMUSCULAR; INTRAVENOUS ONCE
Status: COMPLETED | OUTPATIENT
Start: 2020-07-13 | End: 2020-07-13

## 2020-07-13 RX ORDER — MORPHINE SULFATE 10 MG/ML
6 INJECTION INTRAMUSCULAR; INTRAVENOUS; SUBCUTANEOUS ONCE
Status: DISCONTINUED | OUTPATIENT
Start: 2020-07-13 | End: 2020-07-13

## 2020-07-13 RX ADMIN — MORPHINE SULFATE 4 MG: 4 INJECTION, SOLUTION INTRAMUSCULAR; INTRAVENOUS at 00:36

## 2020-07-13 RX ADMIN — ONDANSETRON HYDROCHLORIDE 4 MG: 2 SOLUTION INTRAMUSCULAR; INTRAVENOUS at 00:35

## 2020-07-13 NOTE — ED PROVIDER NOTES
Subjective   64 y/o female states she was doing trotter when a very heavy door fell striking her on the back and neck resulting in pain from her neck down to her back. She denies LOC or head trauma. She denies numbness or tingling, loss of sensation, fevers, chills or weakness. She denies CP, SOB, abdominal pain, extremity pain or other issues. She arrives in [facility].        Family, social and past history reviewed as below, prior documentation of H and Ps and other documentation are reviewed:    Past Medical History:  No date: Ankle fracture  No date: Anxiety  No date: Arthritis  No date: Depression  No date: Elevated cholesterol  No date: Migraine  No date: Nasal fracture  No date: PONV (postoperative nausea and vomiting)  No date: PTSD (post-traumatic stress disorder)  No date: Smoker  No date: Vision loss  No date: Wrist fracture      Comment:  right wrist    Past Surgical History:  No date: BREAST AUGMENTATION  No date:  SECTION      Comment:  x 4   No date: HYSTERECTOMY  2018: NASAL FRACTURE CLOSED REDUCTION; N/A      Comment:  Procedure: closed nasal reduction with stabilization  2)               open nasal septal fracture repair  ;  Surgeon: Darryl Benites MD;  Location: [facility] OR;  Service: ENT  No date: TONSILLECTOMY    Social History    Socioeconomic History      Marital status:       Spouse name: Not on file      Number of children: Not on file      Years of education: Not on file      Highest education level: Not on file    Tobacco Use      Smoking status: Current Every Day Smoker        Packs/day: 1.50        Types: Cigarettes      Smokeless tobacco: Never Used    Substance and Sexual Activity      Alcohol use: Yes        Comment: OCCASIONAL      Drug use: No      Sexual activity: Defer      Family history: reviewed and noncontributory           Review of Systems   All other systems reviewed and are negative.      Past Medical History:   Diagnosis Date   • Ankle  fracture    • Anxiety    • Arthritis    • Depression    • Elevated cholesterol    • Migraine    • Nasal fracture    • PONV (postoperative nausea and vomiting)    • PTSD (post-traumatic stress disorder)    • Smoker    • Vision loss    • Wrist fracture     right wrist       No Known Allergies    Past Surgical History:   Procedure Laterality Date   • BREAST AUGMENTATION     •  SECTION      x 4    • HYSTERECTOMY     • NASAL FRACTURE CLOSED REDUCTION N/A 2018    Procedure: closed nasal reduction with stabilization  2) open nasal septal fracture repair  ;  Surgeon: Darryl Benites MD;  Location: [facility];  Service: ENT   • TONSILLECTOMY         History reviewed. No pertinent family history.    Social History     Socioeconomic History   • Marital status:      Spouse name: Not on file   • Number of children: Not on file   • Years of education: Not on file   • Highest education level: Not on file   Tobacco Use   • Smoking status: Current Every Day Smoker     Packs/day: 1.50     Types: Cigarettes   • Smokeless tobacco: Never Used   Substance and Sexual Activity   • Alcohol use: Yes     Comment: OCCASIONAL   • Drug use: No   • Sexual activity: Defer           Objective   Physical Exam   Constitutional: She is oriented to person, place, and time. She appears well-developed and well-nourished.   HENT:   Head: Normocephalic and atraumatic.   Eyes: Pupils are equal, round, and reactive to light. Conjunctivae and EOM are normal.   Neck: Normal range of motion. Neck supple.   Cardiovascular: Normal rate, regular rhythm, normal heart sounds and intact distal pulses.   Pulmonary/Chest: Effort normal and breath sounds normal.   Abdominal: Soft. Bowel sounds are normal.   Musculoskeletal: Normal range of motion. She exhibits no edema or deformity.        Cervical back: She exhibits tenderness. She exhibits normal range of motion, no bony tenderness, no swelling, no edema, no deformity, no laceration, no pain, no  spasm and normal pulse.        Thoracic back: She exhibits tenderness. She exhibits normal range of motion, no bony tenderness, no swelling, no edema, no deformity, no laceration, no pain, no spasm and normal pulse.        Lumbar back: She exhibits tenderness. She exhibits normal range of motion, no bony tenderness, no swelling, no edema, no deformity, no laceration, no pain, no spasm and normal pulse.        Arms:  Paraspinal tenderness, no midline tenderness, no step offs, no deformites.    Able to lift both legs from bed without issue.     Both arms from bed without issue    Neurovascularly intact, sensation intact    Distally pulses are intact.    Neurological: She is alert and oriented to person, place, and time.   Skin: Skin is warm. Capillary refill takes less than 2 seconds.   Psychiatric: She has a normal mood and affect. Her behavior is normal.   Vitals reviewed.      Procedures           ED Course  ED Course as of Jul 13 0158   Mon Jul 13, 2020   0155 Cts were without acute findings. At this time I do feel she is stable for dc.     [JH]      ED Course User Index  [JH] Kyle Reed MD         Long talk with the patient about findings. Will provide tylenol 3 for pain control. Aware of reasons for return. All questions answered.                                   MDM    Final diagnoses:   Contusion of neck, initial encounter   Contusion of back, unspecified laterality, initial encounter            Kyle Reed MD  07/13/20 0206

## 2020-07-13 NOTE — ED NOTES
Called pt's son at this time for a ride, no one answered message was left      Shruthi Leo RN  07/13/20 0211

## 2020-10-20 ENCOUNTER — NURSE TRIAGE (OUTPATIENT)
Dept: CALL CENTER | Facility: HOSPITAL | Age: 65
End: 2020-10-20

## 2020-10-21 NOTE — TELEPHONE ENCOUNTER
"Caller states having anxiety and denies SI/HI. Caller states anxiety getting worse. Caller states she has been given paxil and seroquel. Daughter in law came on line and states she is worried about withdrawal as she has been on adderall and Clonazepam long term. Caller advised per guideline.     Reason for Disposition  • Patient sounds very sick or weak to the triager    Additional Information  • Negative: Severe difficulty breathing (e.g., struggling for each breath, speaks in single words)  • Negative: Bluish (or gray) lips or face now  • Negative: Difficult to awaken or acting confused (e.g., disoriented, slurred speech)  • Negative: Hysterical or combative behavior  • Negative: Sounds like a life-threatening emergency to the triager  • Negative: Chest pain  • Negative: Palpitations, skipped heart beat, or rapid heart beat  • Negative: Cough is main symptom  • Negative: Suicide thoughts, threats, attempts, or questions  • Negative: Depression is main problem or symptom (e.g., feelings of sadness or hopelessness)  • Negative: [1] Difficulty breathing AND [2] persists > 10 minutes AND [3] not relieved by reassurance provided by triager  • Negative: [1] Lightheadedness or dizziness AND [2] persists > 10 minutes AND [3] not relieved by reassurance provided by triager  • Negative: [1] Alcohol or drug abuse, known or suspected AND [2] feeling very shaky (i.e., visible tremors of hands)    Answer Assessment - Initial Assessment Questions  1. CONCERN: \"What happened that made you call today?\"      Anxiety   2. ANXIETY SYMPTOM SCREENING: \"Can you describe how you have been feeling?\"  (e.g., tense, restless, panicky, anxious, keyed up, trouble sleeping, trouble concentrating)       Have had in the past and getting worse   3. ONSET: \"How long have you been feeling this way?\"       Last two years worse but last few months worse   4. RECURRENT: \"Have you felt this way before?\"  If yes: \"What happened that time?\" \"What helped " "these feelings go away in the past?\"       Denies   5. RISK OF HARM - SUICIDAL IDEATION:  \"Do you ever have thoughts of hurting or killing yourself?\"  (e.g., yes, no, no but preoccupation with thoughts about death)    - INTENT:  \"Do you have thoughts of hurting or killing yourself right NOW?\" (e.g., yes, no, N/A)    - PLAN: \"Do you have a specific plan for how you would do this?\" (e.g., gun, knife, overdose, no plan, N/A)       Denies self harm   6. RISK OF HARM - HOMICIDAL IDEATION:  \"Do you ever have thoughts of hurting or killing someone else?\"  (e.g., yes, no, no but preoccupation with thoughts about death)    - INTENT:  \"Do you have thoughts of hurting or killing someone right NOW?\" (e.g., yes, no, N/A)    - PLAN: \"Do you have a specific plan for how you would do this?\" (e.g., gun, knife, no plan, N/A)        Denies   7. FUNCTIONAL IMPAIRMENT: \"How have things been going for you overall in your life? Have you had any more difficulties than usual doing your normal daily activities?\"  (e.g., better, same, worse; self-care, school, work, interactions)      Impaired life   8. SUPPORT: \"Who is with you now?\" \"Who do you live with?\" \"Do you have family or friends nearby who you can talk to?\"       Daughter in law with   9. THERAPIST: \"Do you have a counselor or therapist? Name?\"      I talked with lady from [facility]   10. STRESSORS: \"Has there been any new stress or recent changes in your life?\"        Last month   11. CAFFEINE ABUSE: \"Do you drink caffeinated beverages, and how much each day?\" (e.g., coffee, tea, juan c)        Denies   12. SUBSTANCE ABUSE: \"Do you use any illegal drugs or alcohol?\"        Denies   13. OTHER SYMPTOMS: \"Do you have any other physical symptoms right now?\" (e.g., chest pain, palpitations, difficulty breathing, fever)         Denies   14. PREGNANCY: \"Is there any chance you are pregnant?\" \"When was your last menstrual period?\"    Protocols used: ANXIETY AND PANIC ATTACK-ADULT-      "

## 2021-01-18 ENCOUNTER — APPOINTMENT (OUTPATIENT)
Dept: CT IMAGING | Facility: HOSPITAL | Age: 66
End: 2021-01-18

## 2021-01-18 ENCOUNTER — HOSPITAL ENCOUNTER (INPATIENT)
Facility: HOSPITAL | Age: 66
LOS: 2 days | Discharge: HOME | End: 2021-01-20
Attending: EMERGENCY MEDICINE | Admitting: FAMILY MEDICINE

## 2021-01-18 ENCOUNTER — APPOINTMENT (OUTPATIENT)
Dept: GENERAL RADIOLOGY | Facility: HOSPITAL | Age: 66
End: 2021-01-18

## 2021-01-18 DIAGNOSIS — T50.902A INTENTIONAL DRUG OVERDOSE, INITIAL ENCOUNTER: Primary | ICD-10-CM

## 2021-01-18 DIAGNOSIS — F17.200 SMOKER: ICD-10-CM

## 2021-01-18 PROBLEM — J96.01 ACUTE RESPIRATORY FAILURE WITH HYPOXIA (HCC): Status: ACTIVE | Noted: 2021-01-18

## 2021-01-18 PROBLEM — F41.8 DEPRESSION WITH ANXIETY: Status: ACTIVE | Noted: 2021-01-18

## 2021-01-18 PROBLEM — I95.9 HYPOTENSION: Status: ACTIVE | Noted: 2021-01-18

## 2021-01-18 PROBLEM — E87.6 HYPOKALEMIA: Status: ACTIVE | Noted: 2021-01-18

## 2021-01-18 PROBLEM — T17.908A ASPIRATION INTO AIRWAY: Status: ACTIVE | Noted: 2021-01-18

## 2021-01-18 LAB
ALBUMIN SERPL-MCNC: 3.7 G/DL (ref 3.5–5.2)
ALBUMIN/GLOB SERPL: 1.8 G/DL
ALP SERPL-CCNC: 71 U/L (ref 39–117)
ALT SERPL W P-5'-P-CCNC: 9 U/L (ref 1–33)
AMMONIA BLD-SCNC: 16 UMOL/L (ref 11–51)
AMPHET+METHAMPHET UR QL: NEGATIVE
AMPHETAMINES UR QL: NEGATIVE
ANION GAP SERPL CALCULATED.3IONS-SCNC: 10 MMOL/L (ref 5–15)
APAP SERPL-MCNC: <5 MCG/ML (ref 0–30)
ARTERIAL PATENCY WRIST A: POSITIVE
AST SERPL-CCNC: 12 U/L (ref 1–32)
ATMOSPHERIC PRESS: 752 MMHG
BARBITURATES UR QL SCN: NEGATIVE
BASE EXCESS BLDA CALC-SCNC: -4.3 MMOL/L (ref 0–2)
BASOPHILS # BLD AUTO: 0.03 10*3/MM3 (ref 0–0.2)
BASOPHILS NFR BLD AUTO: 0.5 % (ref 0–1.5)
BDY SITE: ABNORMAL
BENZODIAZ UR QL SCN: NEGATIVE
BILIRUB SERPL-MCNC: 0.2 MG/DL (ref 0–1.2)
BODY TEMPERATURE: 37 C
BUN SERPL-MCNC: 25 MG/DL (ref 8–23)
BUN/CREAT SERPL: 30.9 (ref 7–25)
BUPRENORPHINE SERPL-MCNC: NEGATIVE NG/ML
CALCIUM SPEC-SCNC: 8.1 MG/DL (ref 8.6–10.5)
CANNABINOIDS SERPL QL: NEGATIVE
CHLORIDE SERPL-SCNC: 109 MMOL/L (ref 98–107)
CO2 SERPL-SCNC: 19 MMOL/L (ref 22–29)
COCAINE UR QL: POSITIVE
CREAT SERPL-MCNC: 0.81 MG/DL (ref 0.57–1)
DEPRECATED RDW RBC AUTO: 44 FL (ref 37–54)
EOSINOPHIL # BLD AUTO: 0.02 10*3/MM3 (ref 0–0.4)
EOSINOPHIL NFR BLD AUTO: 0.3 % (ref 0.3–6.2)
ERYTHROCYTE [DISTWIDTH] IN BLOOD BY AUTOMATED COUNT: 14.2 % (ref 12.3–15.4)
ETHANOL UR QL: <0.01 %
GFR SERPL CREATININE-BSD FRML MDRD: 71 ML/MIN/1.73
GLOBULIN UR ELPH-MCNC: 2.1 GM/DL
GLUCOSE SERPL-MCNC: 176 MG/DL (ref 65–99)
HCO3 BLDA-SCNC: 22.4 MMOL/L (ref 20–26)
HCT VFR BLD AUTO: 35.7 % (ref 34–46.6)
HGB BLD-MCNC: 12.6 G/DL (ref 12–15.9)
HOLD SPECIMEN: NORMAL
IMM GRANULOCYTES # BLD AUTO: 0.02 10*3/MM3 (ref 0–0.05)
IMM GRANULOCYTES NFR BLD AUTO: 0.3 % (ref 0–0.5)
INHALED O2 CONCENTRATION: 35 %
LITHIUM SERPL-SCNC: <0.1 MMOL/L (ref 0.6–1.2)
LYMPHOCYTES # BLD AUTO: 1.84 10*3/MM3 (ref 0.7–3.1)
LYMPHOCYTES NFR BLD AUTO: 31.8 % (ref 19.6–45.3)
Lab: ABNORMAL
MAGNESIUM SERPL-MCNC: 1.6 MG/DL (ref 1.6–2.4)
MCH RBC QN AUTO: 29.9 PG (ref 26.6–33)
MCHC RBC AUTO-ENTMCNC: 35.3 G/DL (ref 31.5–35.7)
MCV RBC AUTO: 84.6 FL (ref 79–97)
METHADONE UR QL SCN: NEGATIVE
MODALITY: ABNORMAL
MONOCYTES # BLD AUTO: 0.34 10*3/MM3 (ref 0.1–0.9)
MONOCYTES NFR BLD AUTO: 5.9 % (ref 5–12)
NEUTROPHILS NFR BLD AUTO: 3.54 10*3/MM3 (ref 1.7–7)
NEUTROPHILS NFR BLD AUTO: 61.2 % (ref 42.7–76)
NRBC BLD AUTO-RTO: 0 /100 WBC (ref 0–0.2)
OPIATES UR QL: NEGATIVE
OXYCODONE UR QL SCN: NEGATIVE
PCO2 BLDA: 46.8 MM HG (ref 35–45)
PCO2 TEMP ADJ BLD: 46.8 MM HG (ref 35–45)
PCP UR QL SCN: NEGATIVE
PEEP RESPIRATORY: 5 CM[H2O]
PH BLDA: 7.29 PH UNITS (ref 7.35–7.45)
PH, TEMP CORRECTED: 7.29 PH UNITS (ref 7.35–7.45)
PLATELET # BLD AUTO: 261 10*3/MM3 (ref 140–450)
PMV BLD AUTO: 10 FL (ref 6–12)
PO2 BLDA: 86.2 MM HG (ref 83–108)
PO2 TEMP ADJ BLD: 86.2 MM HG (ref 83–108)
POTASSIUM SERPL-SCNC: 3.1 MMOL/L (ref 3.5–5.2)
PROPOXYPH UR QL: NEGATIVE
PROT SERPL-MCNC: 5.8 G/DL (ref 6–8.5)
RBC # BLD AUTO: 4.22 10*6/MM3 (ref 3.77–5.28)
SALICYLATES SERPL-MCNC: 0.3 MG/DL
SAO2 % BLDCOA: 96.8 % (ref 94–99)
SARS-COV-2 RNA PNL SPEC NAA+PROBE: NOT DETECTED
SET MECH RESP RATE: 12
SODIUM SERPL-SCNC: 138 MMOL/L (ref 136–145)
TRICYCLICS UR QL SCN: POSITIVE
TSH SERPL DL<=0.05 MIU/L-ACNC: 1.87 UIU/ML (ref 0.27–4.2)
VENTILATOR MODE: AC
VT ON VENT VENT: 440 ML
WBC # BLD AUTO: 5.79 10*3/MM3 (ref 3.4–10.8)
WHOLE BLOOD HOLD SPECIMEN: NORMAL
WHOLE BLOOD HOLD SPECIMEN: NORMAL

## 2021-01-18 PROCEDURE — 31500 INSERT EMERGENCY AIRWAY: CPT

## 2021-01-18 PROCEDURE — 82077 ASSAY SPEC XCP UR&BREATH IA: CPT | Performed by: EMERGENCY MEDICINE

## 2021-01-18 PROCEDURE — 94002 VENT MGMT INPAT INIT DAY: CPT

## 2021-01-18 PROCEDURE — 82140 ASSAY OF AMMONIA: CPT | Performed by: EMERGENCY MEDICINE

## 2021-01-18 PROCEDURE — 36600 WITHDRAWAL OF ARTERIAL BLOOD: CPT

## 2021-01-18 PROCEDURE — 82803 BLOOD GASES ANY COMBINATION: CPT

## 2021-01-18 PROCEDURE — 5A1935Z RESPIRATORY VENTILATION, LESS THAN 24 CONSECUTIVE HOURS: ICD-10-PCS | Performed by: FAMILY MEDICINE

## 2021-01-18 PROCEDURE — 93005 ELECTROCARDIOGRAM TRACING: CPT | Performed by: EMERGENCY MEDICINE

## 2021-01-18 PROCEDURE — 94799 UNLISTED PULMONARY SVC/PX: CPT

## 2021-01-18 PROCEDURE — 80307 DRUG TEST PRSMV CHEM ANLYZR: CPT | Performed by: EMERGENCY MEDICINE

## 2021-01-18 PROCEDURE — 25010000002 MAGNESIUM SULFATE IN D5W 1G/100ML (PREMIX) 1-5 GM/100ML-% SOLUTION: Performed by: NURSE PRACTITIONER

## 2021-01-18 PROCEDURE — 87635 SARS-COV-2 COVID-19 AMP PRB: CPT | Performed by: EMERGENCY MEDICINE

## 2021-01-18 PROCEDURE — 0BH17EZ INSERTION OF ENDOTRACHEAL AIRWAY INTO TRACHEA, VIA NATURAL OR ARTIFICIAL OPENING: ICD-10-PCS | Performed by: FAMILY MEDICINE

## 2021-01-18 PROCEDURE — 25010000002 MIDAZOLAM PER 1 MG: Performed by: EMERGENCY MEDICINE

## 2021-01-18 PROCEDURE — 25010000002 POTASSIUM CHLORIDE PER 2 MEQ: Performed by: EMERGENCY MEDICINE

## 2021-01-18 PROCEDURE — 25010000002 ENOXAPARIN PER 10 MG: Performed by: NURSE PRACTITIONER

## 2021-01-18 PROCEDURE — 83735 ASSAY OF MAGNESIUM: CPT | Performed by: EMERGENCY MEDICINE

## 2021-01-18 PROCEDURE — 25010000002 MIDAZOLAM 50 MG/10ML SOLUTION 10 ML VIAL: Performed by: INTERNAL MEDICINE

## 2021-01-18 PROCEDURE — 25010000002 SUCCINYLCHOLINE PER 20 MG: Performed by: EMERGENCY MEDICINE

## 2021-01-18 PROCEDURE — 84443 ASSAY THYROID STIM HORMONE: CPT | Performed by: EMERGENCY MEDICINE

## 2021-01-18 PROCEDURE — 99291 CRITICAL CARE FIRST HOUR: CPT

## 2021-01-18 PROCEDURE — 80143 DRUG ASSAY ACETAMINOPHEN: CPT | Performed by: EMERGENCY MEDICINE

## 2021-01-18 PROCEDURE — 85025 COMPLETE CBC W/AUTO DIFF WBC: CPT | Performed by: EMERGENCY MEDICINE

## 2021-01-18 PROCEDURE — 70450 CT HEAD/BRAIN W/O DYE: CPT

## 2021-01-18 PROCEDURE — 51702 INSERT TEMP BLADDER CATH: CPT

## 2021-01-18 PROCEDURE — 25010000002 POTASSIUM CHLORIDE PER 2 MEQ: Performed by: NURSE PRACTITIONER

## 2021-01-18 PROCEDURE — C1751 CATH, INF, PER/CENT/MIDLINE: HCPCS

## 2021-01-18 PROCEDURE — 25810000003 SODIUM CHLORIDE 0.9 % WITH KCL 20 MEQ 20-0.9 MEQ/L-% SOLUTION: Performed by: NURSE PRACTITIONER

## 2021-01-18 PROCEDURE — 93010 ELECTROCARDIOGRAM REPORT: CPT | Performed by: INTERNAL MEDICINE

## 2021-01-18 PROCEDURE — 80053 COMPREHEN METABOLIC PANEL: CPT | Performed by: EMERGENCY MEDICINE

## 2021-01-18 PROCEDURE — 80179 DRUG ASSAY SALICYLATE: CPT | Performed by: EMERGENCY MEDICINE

## 2021-01-18 PROCEDURE — 80178 ASSAY OF LITHIUM: CPT | Performed by: EMERGENCY MEDICINE

## 2021-01-18 PROCEDURE — 80306 DRUG TEST PRSMV INSTRMNT: CPT | Performed by: EMERGENCY MEDICINE

## 2021-01-18 PROCEDURE — 71045 X-RAY EXAM CHEST 1 VIEW: CPT

## 2021-01-18 RX ORDER — FLUOXETINE HYDROCHLORIDE 40 MG/1
40 CAPSULE ORAL DAILY
COMMUNITY

## 2021-01-18 RX ORDER — ONDANSETRON 4 MG/1
4 TABLET, FILM COATED ORAL EVERY 6 HOURS PRN
Status: DISCONTINUED | OUTPATIENT
Start: 2021-01-18 | End: 2021-01-20 | Stop reason: HOSPADM

## 2021-01-18 RX ORDER — QUETIAPINE FUMARATE 100 MG/1
150 TABLET, FILM COATED ORAL NIGHTLY
COMMUNITY
End: 2021-06-04

## 2021-01-18 RX ORDER — SODIUM CHLORIDE AND POTASSIUM CHLORIDE 150; 900 MG/100ML; MG/100ML
75 INJECTION, SOLUTION INTRAVENOUS CONTINUOUS
Status: DISCONTINUED | OUTPATIENT
Start: 2021-01-18 | End: 2021-01-20 | Stop reason: HOSPADM

## 2021-01-18 RX ORDER — POTASSIUM CHLORIDE 14.9 MG/ML
20 INJECTION INTRAVENOUS
Status: COMPLETED | OUTPATIENT
Start: 2021-01-18 | End: 2021-01-19

## 2021-01-18 RX ORDER — SUCCINYLCHOLINE CHLORIDE 20 MG/ML
150 INJECTION INTRAMUSCULAR; INTRAVENOUS ONCE
Status: COMPLETED | OUTPATIENT
Start: 2021-01-18 | End: 2021-01-18

## 2021-01-18 RX ORDER — POTASSIUM CHLORIDE 14.9 MG/ML
20 INJECTION INTRAVENOUS ONCE
Status: COMPLETED | OUTPATIENT
Start: 2021-01-18 | End: 2021-01-18

## 2021-01-18 RX ORDER — MIDAZOLAM HYDROCHLORIDE 1 MG/ML
2 INJECTION INTRAMUSCULAR; INTRAVENOUS ONCE
Status: COMPLETED | OUTPATIENT
Start: 2021-01-18 | End: 2021-01-18

## 2021-01-18 RX ORDER — PRAZOSIN HYDROCHLORIDE 1 MG/1
1 CAPSULE ORAL NIGHTLY
COMMUNITY
End: 2021-06-04

## 2021-01-18 RX ORDER — LIDOCAINE HYDROCHLORIDE 20 MG/ML
10 INJECTION, SOLUTION INFILTRATION; PERINEURAL ONCE
Status: DISCONTINUED | OUTPATIENT
Start: 2021-01-18 | End: 2021-01-20 | Stop reason: HOSPADM

## 2021-01-18 RX ORDER — ETOMIDATE 2 MG/ML
10 INJECTION INTRAVENOUS ONCE
Status: COMPLETED | OUTPATIENT
Start: 2021-01-18 | End: 2021-01-18

## 2021-01-18 RX ORDER — ONDANSETRON 2 MG/ML
4 INJECTION INTRAMUSCULAR; INTRAVENOUS EVERY 6 HOURS PRN
Status: DISCONTINUED | OUTPATIENT
Start: 2021-01-18 | End: 2021-01-20 | Stop reason: HOSPADM

## 2021-01-18 RX ORDER — SODIUM CHLORIDE 0.9 % (FLUSH) 0.9 %
10 SYRINGE (ML) INJECTION AS NEEDED
Status: DISCONTINUED | OUTPATIENT
Start: 2021-01-18 | End: 2021-01-20 | Stop reason: HOSPADM

## 2021-01-18 RX ORDER — SODIUM CHLORIDE 0.9 % (FLUSH) 0.9 %
10 SYRINGE (ML) INJECTION EVERY 12 HOURS SCHEDULED
Status: DISCONTINUED | OUTPATIENT
Start: 2021-01-18 | End: 2021-01-20 | Stop reason: HOSPADM

## 2021-01-18 RX ORDER — CLINDAMYCIN PHOSPHATE 600 MG/50ML
600 INJECTION INTRAVENOUS EVERY 8 HOURS
Status: DISCONTINUED | OUTPATIENT
Start: 2021-01-18 | End: 2021-01-20 | Stop reason: HOSPADM

## 2021-01-18 RX ORDER — MAGNESIUM SULFATE 1 G/100ML
1 INJECTION INTRAVENOUS ONCE
Status: COMPLETED | OUTPATIENT
Start: 2021-01-18 | End: 2021-01-18

## 2021-01-18 RX ADMIN — POTASSIUM CHLORIDE 20 MEQ: 14.9 INJECTION, SOLUTION INTRAVENOUS at 23:38

## 2021-01-18 RX ADMIN — SUCCINYLCHOLINE CHLORIDE 150 MG: 20 INJECTION, SOLUTION INTRAMUSCULAR; INTRAVENOUS at 11:42

## 2021-01-18 RX ADMIN — POTASSIUM CHLORIDE 20 MEQ: 14.9 INJECTION, SOLUTION INTRAVENOUS at 21:26

## 2021-01-18 RX ADMIN — SODIUM BICARBONATE 125 ML/HR: 84 INJECTION, SOLUTION INTRAVENOUS at 14:19

## 2021-01-18 RX ADMIN — MAGNESIUM SULFATE HEPTAHYDRATE 1 G: 1 INJECTION, SOLUTION INTRAVENOUS at 21:26

## 2021-01-18 RX ADMIN — POTASSIUM CHLORIDE 20 MEQ: 14.9 INJECTION, SOLUTION INTRAVENOUS at 14:17

## 2021-01-18 RX ADMIN — ETOMIDATE 10 MG: 2 INJECTION, SOLUTION INTRAVENOUS at 11:42

## 2021-01-18 RX ADMIN — POTASSIUM CHLORIDE AND SODIUM CHLORIDE 75 ML/HR: 900; 150 INJECTION, SOLUTION INTRAVENOUS at 18:27

## 2021-01-18 RX ADMIN — SODIUM CHLORIDE, PRESERVATIVE FREE 10 ML: 5 INJECTION INTRAVENOUS at 21:27

## 2021-01-18 RX ADMIN — MIDAZOLAM 2 MG: 1 INJECTION INTRAMUSCULAR; INTRAVENOUS at 15:20

## 2021-01-18 RX ADMIN — CLINDAMYCIN IN 5 PERCENT DEXTROSE 600 MG: 12 INJECTION, SOLUTION INTRAVENOUS at 18:24

## 2021-01-18 RX ADMIN — ENOXAPARIN SODIUM 40 MG: 40 INJECTION SUBCUTANEOUS at 21:27

## 2021-01-18 RX ADMIN — MIDAZOLAM 1 MG/HR: 5 INJECTION INTRAMUSCULAR; INTRAVENOUS at 23:49

## 2021-01-19 ENCOUNTER — APPOINTMENT (OUTPATIENT)
Dept: GENERAL RADIOLOGY | Facility: HOSPITAL | Age: 66
End: 2021-01-19

## 2021-01-19 LAB
ALBUMIN SERPL-MCNC: 3.2 G/DL (ref 3.5–5.2)
ALBUMIN/GLOB SERPL: 1.4 G/DL
ALP SERPL-CCNC: 65 U/L (ref 39–117)
ALT SERPL W P-5'-P-CCNC: 11 U/L (ref 1–33)
ANION GAP SERPL CALCULATED.3IONS-SCNC: 4 MMOL/L (ref 5–15)
ARTERIAL PATENCY WRIST A: ABNORMAL
AST SERPL-CCNC: 23 U/L (ref 1–32)
ATMOSPHERIC PRESS: 753 MMHG
BASE EXCESS BLDA CALC-SCNC: 0.9 MMOL/L (ref 0–2)
BASOPHILS # BLD AUTO: 0.02 10*3/MM3 (ref 0–0.2)
BASOPHILS NFR BLD AUTO: 0.3 % (ref 0–1.5)
BDY SITE: ABNORMAL
BILIRUB SERPL-MCNC: 0.2 MG/DL (ref 0–1.2)
BODY TEMPERATURE: 37 C
BUN SERPL-MCNC: 16 MG/DL (ref 8–23)
BUN/CREAT SERPL: 21.6 (ref 7–25)
CALCIUM SPEC-SCNC: 7.8 MG/DL (ref 8.6–10.5)
CHLORIDE SERPL-SCNC: 108 MMOL/L (ref 98–107)
CHOLEST SERPL-MCNC: 197 MG/DL (ref 0–200)
CO2 SERPL-SCNC: 26 MMOL/L (ref 22–29)
CREAT SERPL-MCNC: 0.74 MG/DL (ref 0.57–1)
DEPRECATED RDW RBC AUTO: 45.2 FL (ref 37–54)
EOSINOPHIL # BLD AUTO: 0.04 10*3/MM3 (ref 0–0.4)
EOSINOPHIL NFR BLD AUTO: 0.6 % (ref 0.3–6.2)
ERYTHROCYTE [DISTWIDTH] IN BLOOD BY AUTOMATED COUNT: 14.4 % (ref 12.3–15.4)
GFR SERPL CREATININE-BSD FRML MDRD: 79 ML/MIN/1.73
GLOBULIN UR ELPH-MCNC: 2.3 GM/DL
GLUCOSE SERPL-MCNC: 108 MG/DL (ref 65–99)
HBA1C MFR BLD: 5.5 % (ref 4.8–5.6)
HCO3 BLDA-SCNC: 26.7 MMOL/L (ref 20–26)
HCT VFR BLD AUTO: 31.8 % (ref 34–46.6)
HDLC SERPL-MCNC: 41 MG/DL (ref 40–60)
HGB BLD-MCNC: 10.8 G/DL (ref 12–15.9)
IMM GRANULOCYTES # BLD AUTO: 0.02 10*3/MM3 (ref 0–0.05)
IMM GRANULOCYTES NFR BLD AUTO: 0.3 % (ref 0–0.5)
INHALED O2 CONCENTRATION: 30 %
LDLC SERPL CALC-MCNC: 135 MG/DL (ref 0–100)
LDLC/HDLC SERPL: 3.24 {RATIO}
LYMPHOCYTES # BLD AUTO: 1.26 10*3/MM3 (ref 0.7–3.1)
LYMPHOCYTES NFR BLD AUTO: 17.7 % (ref 19.6–45.3)
Lab: ABNORMAL
MCH RBC QN AUTO: 29.3 PG (ref 26.6–33)
MCHC RBC AUTO-ENTMCNC: 34 G/DL (ref 31.5–35.7)
MCV RBC AUTO: 86.2 FL (ref 79–97)
MODALITY: ABNORMAL
MONOCYTES # BLD AUTO: 0.42 10*3/MM3 (ref 0.1–0.9)
MONOCYTES NFR BLD AUTO: 5.9 % (ref 5–12)
NEUTROPHILS NFR BLD AUTO: 5.36 10*3/MM3 (ref 1.7–7)
NEUTROPHILS NFR BLD AUTO: 75.2 % (ref 42.7–76)
NRBC BLD AUTO-RTO: 0 /100 WBC (ref 0–0.2)
PCO2 BLDA: 46.7 MM HG (ref 35–45)
PCO2 TEMP ADJ BLD: 46.7 MM HG (ref 35–45)
PEEP RESPIRATORY: 5 CM[H2O]
PH BLDA: 7.37 PH UNITS (ref 7.35–7.45)
PH, TEMP CORRECTED: 7.37 PH UNITS (ref 7.35–7.45)
PLATELET # BLD AUTO: 225 10*3/MM3 (ref 140–450)
PMV BLD AUTO: 10.3 FL (ref 6–12)
PO2 BLDA: 128 MM HG (ref 83–108)
PO2 TEMP ADJ BLD: 128 MM HG (ref 83–108)
POTASSIUM SERPL-SCNC: 4.3 MMOL/L (ref 3.5–5.2)
PROT SERPL-MCNC: 5.5 G/DL (ref 6–8.5)
RBC # BLD AUTO: 3.69 10*6/MM3 (ref 3.77–5.28)
SAO2 % BLDCOA: 99 % (ref 94–99)
SET MECH RESP RATE: 12
SODIUM SERPL-SCNC: 138 MMOL/L (ref 136–145)
TRIGL SERPL-MCNC: 115 MG/DL (ref 0–150)
VENTILATOR MODE: AC
VLDLC SERPL-MCNC: 21 MG/DL (ref 5–40)
VT ON VENT VENT: 440 ML
WBC # BLD AUTO: 7.12 10*3/MM3 (ref 3.4–10.8)

## 2021-01-19 PROCEDURE — 94799 UNLISTED PULMONARY SVC/PX: CPT

## 2021-01-19 PROCEDURE — 25010000002 ENOXAPARIN PER 10 MG: Performed by: NURSE PRACTITIONER

## 2021-01-19 PROCEDURE — 25010000002 FUROSEMIDE PER 20 MG: Performed by: INTERNAL MEDICINE

## 2021-01-19 PROCEDURE — 36600 WITHDRAWAL OF ARTERIAL BLOOD: CPT

## 2021-01-19 PROCEDURE — 25010000002 FENTANYL CITRATE (PF) 100 MCG/2ML SOLUTION: Performed by: INTERNAL MEDICINE

## 2021-01-19 PROCEDURE — 93010 ELECTROCARDIOGRAM REPORT: CPT | Performed by: INTERNAL MEDICINE

## 2021-01-19 PROCEDURE — 80053 COMPREHEN METABOLIC PANEL: CPT | Performed by: NURSE PRACTITIONER

## 2021-01-19 PROCEDURE — 93005 ELECTROCARDIOGRAM TRACING: CPT | Performed by: NURSE PRACTITIONER

## 2021-01-19 PROCEDURE — 25810000003 SODIUM CHLORIDE 0.9 % WITH KCL 20 MEQ 20-0.9 MEQ/L-% SOLUTION: Performed by: NURSE PRACTITIONER

## 2021-01-19 PROCEDURE — 94003 VENT MGMT INPAT SUBQ DAY: CPT

## 2021-01-19 PROCEDURE — 82803 BLOOD GASES ANY COMBINATION: CPT

## 2021-01-19 PROCEDURE — 71045 X-RAY EXAM CHEST 1 VIEW: CPT

## 2021-01-19 PROCEDURE — 83036 HEMOGLOBIN GLYCOSYLATED A1C: CPT | Performed by: NURSE PRACTITIONER

## 2021-01-19 PROCEDURE — 25010000002 POTASSIUM CHLORIDE PER 2 MEQ: Performed by: NURSE PRACTITIONER

## 2021-01-19 PROCEDURE — 99223 1ST HOSP IP/OBS HIGH 75: CPT | Performed by: INTERNAL MEDICINE

## 2021-01-19 PROCEDURE — 80061 LIPID PANEL: CPT | Performed by: NURSE PRACTITIONER

## 2021-01-19 PROCEDURE — 85025 COMPLETE CBC W/AUTO DIFF WBC: CPT | Performed by: NURSE PRACTITIONER

## 2021-01-19 RX ORDER — FUROSEMIDE 10 MG/ML
40 INJECTION INTRAMUSCULAR; INTRAVENOUS ONCE
Status: COMPLETED | OUTPATIENT
Start: 2021-01-19 | End: 2021-01-19

## 2021-01-19 RX ORDER — FENTANYL CITRATE 50 UG/ML
50 INJECTION, SOLUTION INTRAMUSCULAR; INTRAVENOUS EVERY 4 HOURS PRN
Status: DISCONTINUED | OUTPATIENT
Start: 2021-01-19 | End: 2021-01-20 | Stop reason: HOSPADM

## 2021-01-19 RX ORDER — IPRATROPIUM BROMIDE AND ALBUTEROL SULFATE 2.5; .5 MG/3ML; MG/3ML
3 SOLUTION RESPIRATORY (INHALATION)
Status: DISCONTINUED | OUTPATIENT
Start: 2021-01-19 | End: 2021-01-20

## 2021-01-19 RX ORDER — FAMOTIDINE 10 MG/ML
20 INJECTION, SOLUTION INTRAVENOUS EVERY 12 HOURS SCHEDULED
Status: DISCONTINUED | OUTPATIENT
Start: 2021-01-19 | End: 2021-01-20 | Stop reason: HOSPADM

## 2021-01-19 RX ADMIN — CLINDAMYCIN IN 5 PERCENT DEXTROSE 600 MG: 12 INJECTION, SOLUTION INTRAVENOUS at 01:30

## 2021-01-19 RX ADMIN — FUROSEMIDE 40 MG: 10 INJECTION, SOLUTION INTRAVENOUS at 15:44

## 2021-01-19 RX ADMIN — POTASSIUM CHLORIDE 20 MEQ: 14.9 INJECTION, SOLUTION INTRAVENOUS at 02:21

## 2021-01-19 RX ADMIN — NOREPINEPHRINE BITARTRATE 0.02 MCG/KG/MIN: 1 INJECTION, SOLUTION, CONCENTRATE INTRAVENOUS at 08:57

## 2021-01-19 RX ADMIN — SODIUM CHLORIDE 500 ML: 9 INJECTION, SOLUTION INTRAVENOUS at 05:17

## 2021-01-19 RX ADMIN — SODIUM CHLORIDE, PRESERVATIVE FREE 10 ML: 5 INJECTION INTRAVENOUS at 20:08

## 2021-01-19 RX ADMIN — POTASSIUM CHLORIDE AND SODIUM CHLORIDE 75 ML/HR: 900; 150 INJECTION, SOLUTION INTRAVENOUS at 08:13

## 2021-01-19 RX ADMIN — CLINDAMYCIN IN 5 PERCENT DEXTROSE 600 MG: 12 INJECTION, SOLUTION INTRAVENOUS at 08:58

## 2021-01-19 RX ADMIN — SODIUM CHLORIDE, PRESERVATIVE FREE 10 ML: 5 INJECTION INTRAVENOUS at 07:56

## 2021-01-19 RX ADMIN — FAMOTIDINE 20 MG: 10 INJECTION INTRAVENOUS at 20:07

## 2021-01-19 RX ADMIN — FENTANYL CITRATE 50 MCG: 50 INJECTION INTRAMUSCULAR; INTRAVENOUS at 04:41

## 2021-01-19 RX ADMIN — ENOXAPARIN SODIUM 40 MG: 40 INJECTION SUBCUTANEOUS at 22:15

## 2021-01-19 RX ADMIN — IPRATROPIUM BROMIDE AND ALBUTEROL SULFATE 3 ML: 2.5; .5 SOLUTION RESPIRATORY (INHALATION) at 21:07

## 2021-01-19 RX ADMIN — IPRATROPIUM BROMIDE AND ALBUTEROL SULFATE 3 ML: 2.5; .5 SOLUTION RESPIRATORY (INHALATION) at 14:55

## 2021-01-19 RX ADMIN — CLINDAMYCIN IN 5 PERCENT DEXTROSE 600 MG: 12 INJECTION, SOLUTION INTRAVENOUS at 17:44

## 2021-01-20 VITALS
TEMPERATURE: 97.8 F | WEIGHT: 143.96 LBS | HEART RATE: 82 BPM | OXYGEN SATURATION: 96 % | RESPIRATION RATE: 16 BRPM | BODY MASS INDEX: 24.58 KG/M2 | DIASTOLIC BLOOD PRESSURE: 50 MMHG | HEIGHT: 64 IN | SYSTOLIC BLOOD PRESSURE: 92 MMHG

## 2021-01-20 LAB
ANION GAP SERPL CALCULATED.3IONS-SCNC: 6 MMOL/L (ref 5–15)
BASOPHILS # BLD AUTO: 0.04 10*3/MM3 (ref 0–0.2)
BASOPHILS NFR BLD AUTO: 0.6 % (ref 0–1.5)
BUN SERPL-MCNC: 17 MG/DL (ref 8–23)
BUN/CREAT SERPL: 17 (ref 7–25)
CALCIUM SPEC-SCNC: 8.5 MG/DL (ref 8.6–10.5)
CHLORIDE SERPL-SCNC: 104 MMOL/L (ref 98–107)
CO2 SERPL-SCNC: 27 MMOL/L (ref 22–29)
CREAT SERPL-MCNC: 1 MG/DL (ref 0.57–1)
DEPRECATED RDW RBC AUTO: 43 FL (ref 37–54)
EOSINOPHIL # BLD AUTO: 0.06 10*3/MM3 (ref 0–0.4)
EOSINOPHIL NFR BLD AUTO: 0.8 % (ref 0.3–6.2)
ERYTHROCYTE [DISTWIDTH] IN BLOOD BY AUTOMATED COUNT: 14 % (ref 12.3–15.4)
GFR SERPL CREATININE-BSD FRML MDRD: 56 ML/MIN/1.73
GLUCOSE SERPL-MCNC: 96 MG/DL (ref 65–99)
HCT VFR BLD AUTO: 32.3 % (ref 34–46.6)
HGB BLD-MCNC: 11.3 G/DL (ref 12–15.9)
IMM GRANULOCYTES # BLD AUTO: 0.02 10*3/MM3 (ref 0–0.05)
IMM GRANULOCYTES NFR BLD AUTO: 0.3 % (ref 0–0.5)
LYMPHOCYTES # BLD AUTO: 1.72 10*3/MM3 (ref 0.7–3.1)
LYMPHOCYTES NFR BLD AUTO: 24.2 % (ref 19.6–45.3)
MCH RBC QN AUTO: 29.4 PG (ref 26.6–33)
MCHC RBC AUTO-ENTMCNC: 35 G/DL (ref 31.5–35.7)
MCV RBC AUTO: 84.1 FL (ref 79–97)
MONOCYTES # BLD AUTO: 0.39 10*3/MM3 (ref 0.1–0.9)
MONOCYTES NFR BLD AUTO: 5.5 % (ref 5–12)
NEUTROPHILS NFR BLD AUTO: 4.87 10*3/MM3 (ref 1.7–7)
NEUTROPHILS NFR BLD AUTO: 68.6 % (ref 42.7–76)
NRBC BLD AUTO-RTO: 0 /100 WBC (ref 0–0.2)
PLATELET # BLD AUTO: 240 10*3/MM3 (ref 140–450)
PMV BLD AUTO: 10.4 FL (ref 6–12)
POTASSIUM SERPL-SCNC: 4 MMOL/L (ref 3.5–5.2)
QT INTERVAL: 340 MS
QT INTERVAL: 438 MS
QTC INTERVAL: 465 MS
QTC INTERVAL: 492 MS
RBC # BLD AUTO: 3.84 10*6/MM3 (ref 3.77–5.28)
SODIUM SERPL-SCNC: 137 MMOL/L (ref 136–145)
WBC # BLD AUTO: 7.1 10*3/MM3 (ref 3.4–10.8)

## 2021-01-20 PROCEDURE — 94799 UNLISTED PULMONARY SVC/PX: CPT

## 2021-01-20 PROCEDURE — 85025 COMPLETE CBC W/AUTO DIFF WBC: CPT | Performed by: INTERNAL MEDICINE

## 2021-01-20 PROCEDURE — 99232 SBSQ HOSP IP/OBS MODERATE 35: CPT | Performed by: INTERNAL MEDICINE

## 2021-01-20 PROCEDURE — 80048 BASIC METABOLIC PNL TOTAL CA: CPT | Performed by: INTERNAL MEDICINE

## 2021-01-20 RX ADMIN — IPRATROPIUM BROMIDE AND ALBUTEROL SULFATE 3 ML: 2.5; .5 SOLUTION RESPIRATORY (INHALATION) at 10:06

## 2021-01-20 RX ADMIN — CLINDAMYCIN IN 5 PERCENT DEXTROSE 600 MG: 12 INJECTION, SOLUTION INTRAVENOUS at 00:47

## 2021-01-20 RX ADMIN — CLINDAMYCIN IN 5 PERCENT DEXTROSE 600 MG: 12 INJECTION, SOLUTION INTRAVENOUS at 17:34

## 2021-01-20 RX ADMIN — CLINDAMYCIN IN 5 PERCENT DEXTROSE 600 MG: 12 INJECTION, SOLUTION INTRAVENOUS at 08:39

## 2021-01-20 RX ADMIN — FAMOTIDINE 20 MG: 10 INJECTION INTRAVENOUS at 08:38

## 2021-01-20 RX ADMIN — IPRATROPIUM BROMIDE AND ALBUTEROL SULFATE 3 ML: 2.5; .5 SOLUTION RESPIRATORY (INHALATION) at 06:14

## 2021-02-20 ENCOUNTER — HOSPITAL ENCOUNTER (EMERGENCY)
Facility: HOSPITAL | Age: 66
Discharge: HOME | End: 2021-02-20
Attending: EMERGENCY MEDICINE | Admitting: EMERGENCY MEDICINE

## 2021-02-20 ENCOUNTER — APPOINTMENT (OUTPATIENT)
Dept: GENERAL RADIOLOGY | Facility: HOSPITAL | Age: 66
End: 2021-02-20

## 2021-02-20 VITALS
OXYGEN SATURATION: 96 % | RESPIRATION RATE: 19 BRPM | WEIGHT: 125 LBS | DIASTOLIC BLOOD PRESSURE: 79 MMHG | HEART RATE: 86 BPM | BODY MASS INDEX: 20.83 KG/M2 | HEIGHT: 65 IN | TEMPERATURE: 98.7 F | SYSTOLIC BLOOD PRESSURE: 124 MMHG

## 2021-02-20 DIAGNOSIS — F41.0 PANIC ATTACK: Primary | ICD-10-CM

## 2021-02-20 DIAGNOSIS — R07.2 PRECORDIAL PAIN: ICD-10-CM

## 2021-02-20 LAB
ALBUMIN SERPL-MCNC: 4.6 G/DL (ref 3.5–5.2)
ALBUMIN/GLOB SERPL: 2.1 G/DL
ALP SERPL-CCNC: 81 U/L (ref 39–117)
ALT SERPL W P-5'-P-CCNC: 13 U/L (ref 1–33)
ANION GAP SERPL CALCULATED.3IONS-SCNC: 21 MMOL/L (ref 5–15)
APAP SERPL-MCNC: <5 MCG/ML (ref 0–30)
AST SERPL-CCNC: 17 U/L (ref 1–32)
BASOPHILS # BLD AUTO: 0.03 10*3/MM3 (ref 0–0.2)
BASOPHILS NFR BLD AUTO: 0.5 % (ref 0–1.5)
BILIRUB SERPL-MCNC: 0.3 MG/DL (ref 0–1.2)
BUN SERPL-MCNC: 21 MG/DL (ref 8–23)
BUN/CREAT SERPL: 20.2 (ref 7–25)
CALCIUM SPEC-SCNC: 9.3 MG/DL (ref 8.6–10.5)
CHLORIDE SERPL-SCNC: 93 MMOL/L (ref 98–107)
CO2 SERPL-SCNC: 19 MMOL/L (ref 22–29)
CREAT SERPL-MCNC: 1.04 MG/DL (ref 0.57–1)
D DIMER PPP FEU-MCNC: <0.22 MG/L (FEU) (ref 0–0.5)
D-LACTATE SERPL-SCNC: 1.1 MMOL/L (ref 0.5–2)
DEPRECATED RDW RBC AUTO: 36 FL (ref 37–54)
EOSINOPHIL # BLD AUTO: 0.03 10*3/MM3 (ref 0–0.4)
EOSINOPHIL NFR BLD AUTO: 0.5 % (ref 0.3–6.2)
ERYTHROCYTE [DISTWIDTH] IN BLOOD BY AUTOMATED COUNT: 12.6 % (ref 12.3–15.4)
GFR SERPL CREATININE-BSD FRML MDRD: 53 ML/MIN/1.73
GLOBULIN UR ELPH-MCNC: 2.2 GM/DL
GLUCOSE SERPL-MCNC: 110 MG/DL (ref 65–99)
HCT VFR BLD AUTO: 39.8 % (ref 34–46.6)
HGB BLD-MCNC: 14.4 G/DL (ref 12–15.9)
HOLD SPECIMEN: NORMAL
HOLD SPECIMEN: NORMAL
IMM GRANULOCYTES # BLD AUTO: 0.01 10*3/MM3 (ref 0–0.05)
IMM GRANULOCYTES NFR BLD AUTO: 0.2 % (ref 0–0.5)
LYMPHOCYTES # BLD AUTO: 1.51 10*3/MM3 (ref 0.7–3.1)
LYMPHOCYTES NFR BLD AUTO: 23.6 % (ref 19.6–45.3)
MCH RBC QN AUTO: 29 PG (ref 26.6–33)
MCHC RBC AUTO-ENTMCNC: 36.2 G/DL (ref 31.5–35.7)
MCV RBC AUTO: 80.1 FL (ref 79–97)
MONOCYTES # BLD AUTO: 0.37 10*3/MM3 (ref 0.1–0.9)
MONOCYTES NFR BLD AUTO: 5.8 % (ref 5–12)
NEUTROPHILS NFR BLD AUTO: 4.46 10*3/MM3 (ref 1.7–7)
NEUTROPHILS NFR BLD AUTO: 69.4 % (ref 42.7–76)
NRBC BLD AUTO-RTO: 0 /100 WBC (ref 0–0.2)
NT-PROBNP SERPL-MCNC: 52.9 PG/ML (ref 0–900)
PLATELET # BLD AUTO: 253 10*3/MM3 (ref 140–450)
PMV BLD AUTO: 9.8 FL (ref 6–12)
POTASSIUM SERPL-SCNC: 3.7 MMOL/L (ref 3.5–5.2)
PROT SERPL-MCNC: 6.8 G/DL (ref 6–8.5)
RBC # BLD AUTO: 4.97 10*6/MM3 (ref 3.77–5.28)
SALICYLATES SERPL-MCNC: <0.3 MG/DL
SODIUM SERPL-SCNC: 133 MMOL/L (ref 136–145)
TROPONIN T SERPL-MCNC: <0.01 NG/ML (ref 0–0.03)
TROPONIN T SERPL-MCNC: <0.01 NG/ML (ref 0–0.03)
WBC # BLD AUTO: 6.41 10*3/MM3 (ref 3.4–10.8)
WHOLE BLOOD HOLD SPECIMEN: NORMAL
WHOLE BLOOD HOLD SPECIMEN: NORMAL

## 2021-02-20 PROCEDURE — 83605 ASSAY OF LACTIC ACID: CPT | Performed by: EMERGENCY MEDICINE

## 2021-02-20 PROCEDURE — 84484 ASSAY OF TROPONIN QUANT: CPT | Performed by: EMERGENCY MEDICINE

## 2021-02-20 PROCEDURE — 96375 TX/PRO/DX INJ NEW DRUG ADDON: CPT

## 2021-02-20 PROCEDURE — 85379 FIBRIN DEGRADATION QUANT: CPT | Performed by: EMERGENCY MEDICINE

## 2021-02-20 PROCEDURE — 93010 ELECTROCARDIOGRAM REPORT: CPT | Performed by: INTERNAL MEDICINE

## 2021-02-20 PROCEDURE — 85025 COMPLETE CBC W/AUTO DIFF WBC: CPT | Performed by: EMERGENCY MEDICINE

## 2021-02-20 PROCEDURE — 99283 EMERGENCY DEPT VISIT LOW MDM: CPT

## 2021-02-20 PROCEDURE — 80143 DRUG ASSAY ACETAMINOPHEN: CPT | Performed by: EMERGENCY MEDICINE

## 2021-02-20 PROCEDURE — 25010000002 MAGNESIUM SULFATE 2 GM/50ML SOLUTION: Performed by: EMERGENCY MEDICINE

## 2021-02-20 PROCEDURE — 96365 THER/PROPH/DIAG IV INF INIT: CPT

## 2021-02-20 PROCEDURE — 83880 ASSAY OF NATRIURETIC PEPTIDE: CPT | Performed by: EMERGENCY MEDICINE

## 2021-02-20 PROCEDURE — 80053 COMPREHEN METABOLIC PANEL: CPT | Performed by: EMERGENCY MEDICINE

## 2021-02-20 PROCEDURE — 25010000002 ONDANSETRON PER 1 MG: Performed by: EMERGENCY MEDICINE

## 2021-02-20 PROCEDURE — 71045 X-RAY EXAM CHEST 1 VIEW: CPT

## 2021-02-20 PROCEDURE — 96366 THER/PROPH/DIAG IV INF ADDON: CPT

## 2021-02-20 PROCEDURE — 99284 EMERGENCY DEPT VISIT MOD MDM: CPT

## 2021-02-20 PROCEDURE — 93005 ELECTROCARDIOGRAM TRACING: CPT | Performed by: EMERGENCY MEDICINE

## 2021-02-20 PROCEDURE — 36415 COLL VENOUS BLD VENIPUNCTURE: CPT

## 2021-02-20 PROCEDURE — 80179 DRUG ASSAY SALICYLATE: CPT | Performed by: EMERGENCY MEDICINE

## 2021-02-20 RX ORDER — LORAZEPAM 0.5 MG/1
1 TABLET ORAL ONCE
Status: COMPLETED | OUTPATIENT
Start: 2021-02-20 | End: 2021-02-20

## 2021-02-20 RX ORDER — ONDANSETRON 2 MG/ML
4 INJECTION INTRAMUSCULAR; INTRAVENOUS ONCE
Status: COMPLETED | OUTPATIENT
Start: 2021-02-20 | End: 2021-02-20

## 2021-02-20 RX ORDER — MAGNESIUM SULFATE HEPTAHYDRATE 40 MG/ML
2 INJECTION, SOLUTION INTRAVENOUS ONCE
Status: COMPLETED | OUTPATIENT
Start: 2021-02-20 | End: 2021-02-20

## 2021-02-20 RX ADMIN — SODIUM CHLORIDE, POTASSIUM CHLORIDE, SODIUM LACTATE AND CALCIUM CHLORIDE 1000 ML: 600; 310; 30; 20 INJECTION, SOLUTION INTRAVENOUS at 12:48

## 2021-02-20 RX ADMIN — LORAZEPAM 1 MG: 0.5 TABLET ORAL at 11:35

## 2021-02-20 RX ADMIN — ONDANSETRON HYDROCHLORIDE 4 MG: 2 SOLUTION INTRAMUSCULAR; INTRAVENOUS at 11:35

## 2021-02-20 RX ADMIN — MAGNESIUM SULFATE HEPTAHYDRATE 2 G: 40 INJECTION, SOLUTION INTRAVENOUS at 11:35

## 2021-02-20 NOTE — ED PROVIDER NOTES
Subjective   65-year-old lady with a past medical history of anxiety, depression, PTSD, suicide attempt approximately 1 month ago treated here who presents the emergency department with a chief complaint of what she describes as an anxiety attack.  She states she began to feel bad yesterday evening and felt more anxious and then just prior to arrival she had some intermittent retrosternal chest pain that was sharp, 4 out of 10 in severity, nonradiating, no exacerbating relieving factors.  Associated with nausea and tingling in all 4 extremities.  She denies any chest pain this time.  She has no abdominal pain, vomiting, numbness, weakness, or paresthesias.  She denies suicidal ideation, homicidal ideation, or audiovisual hallucinations.  States she has been seeing a provider since her suicide attempt.    Past medical history: Anxiety, depression, PTSD  Social history: Denies illicit drug use      History provided by:  Patient and EMS personnel      Review of Systems   All other systems reviewed and are negative.      Past Medical History:   Diagnosis Date   • Ankle fracture    • Anxiety    • Arthritis    • Depression    • Elevated cholesterol    • Migraine    • Nasal fracture    • PONV (postoperative nausea and vomiting)    • PTSD (post-traumatic stress disorder)    • Smoker    • Vision loss    • Wrist fracture     right wrist       No Known Allergies    Past Surgical History:   Procedure Laterality Date   • BREAST AUGMENTATION     •  SECTION      x 4    • HYSTERECTOMY     • NASAL FRACTURE CLOSED REDUCTION N/A 2018    Procedure: closed nasal reduction with stabilization  2) open nasal septal fracture repair  ;  Surgeon: Darryl Benites MD;  Location: [facility];  Service: ENT   • TONSILLECTOMY         History reviewed. No pertinent family history.    Social History     Socioeconomic History   • Marital status:      Spouse name: Not on file   • Number of children: Not on file   • Years of  education: Not on file   • Highest education level: Not on file   Tobacco Use   • Smoking status: Current Some Day Smoker     Packs/day: 0.50     Types: Cigarettes   • Smokeless tobacco: Never Used   Substance and Sexual Activity   • Alcohol use: Yes     Comment: OCCASIONAL   • Drug use: No   • Sexual activity: Defer           Objective   Physical Exam  Vitals signs and nursing note reviewed.   Constitutional:       General: She is not in acute distress.     Appearance: Normal appearance. She is normal weight. She is not ill-appearing, toxic-appearing or diaphoretic.   HENT:      Head: Normocephalic and atraumatic.      Nose: Nose normal.      Mouth/Throat:      Mouth: Mucous membranes are moist.   Eyes:      Extraocular Movements: Extraocular movements intact.   Neck:      Musculoskeletal: Normal range of motion and neck supple.   Cardiovascular:      Rate and Rhythm: Normal rate and regular rhythm.      Pulses: Normal pulses.      Heart sounds: Normal heart sounds. No murmur. No friction rub. No gallop.    Pulmonary:      Effort: Pulmonary effort is normal. No respiratory distress.      Breath sounds: Normal breath sounds. No stridor. No wheezing, rhonchi or rales.   Abdominal:      General: Abdomen is flat. Bowel sounds are normal. There is no distension.      Palpations: There is no mass.      Tenderness: There is no abdominal tenderness. There is no guarding or rebound.      Hernia: No hernia is present.   Musculoskeletal: Normal range of motion.         General: No swelling or tenderness.   Skin:     General: Skin is warm and dry.      Capillary Refill: Capillary refill takes less than 2 seconds.      Coloration: Skin is not jaundiced or pale.      Findings: No bruising, erythema, lesion or rash.   Neurological:      General: No focal deficit present.      Mental Status: She is alert and oriented to person, place, and time.   Psychiatric:         Mood and Affect: Mood normal.         Behavior: Behavior normal.          Thought Content: Thought content normal.         Judgment: Judgment normal.         Procedures           ED Course                                           MDM  Number of Diagnoses or Management Options  Panic attack: new and requires workup  Precordial pain: new and requires workup  Diagnosis management comments: Patient presents with concerns for an anxiety attack.  Upon arrival in no acute distress vital signs are reassuring.  IV access is obtained and labs are sent.  She is evaluated with a chest x-ray.  She is given 1 of oral Ativan.Symptoms have completely resolved with Ativan.  For chest pain she was worked up with negative troponin both on arrival and 2 hours later, Tylenol and aspirin levels are negative, lactic acid within normal limits, D-dimer is negative, BMP is within normal limits, CBC is unremarkable, CMP shows elevated anion gap although this appears to be due to low chloride, her bicarb is 19.  She denies taking any other substances.  She is given magnesium for prolonged QT although this appears consistent with baseline she is given a bolus of lactated Ringer's infusing as I believe her elevated anion gap is likely due to mild starvation ketosis.  She vehemently denies suicidal or homicidal ideation on repeat questioning.  She has a safe living environment with her son.  We will not prescribe her benzodiazepines to go home due to history of overdose.  Low concern for acute coronary syndrome as HEART score is 2([id number]) and troponin is within normal limits both on arrival and three hours after presentation. Low concern for cardiac tamponade with no tachycardia, no hypotension, no narrow pulse pressure, no muffled heart sounds, no elevated JVD, and no known risk factors for pericardial effusion. Low concern for aortic dissection with no thunderclap in onset pain, no neurologic symptoms, no widened mediastinum on CXR, no tearing or ripping pain.  Low concern for pneumonia with no fevers,  chills, or cough, and no findings of pneumonia on CXR or physical exam. Low concern for pulmonary embolism as they are low risk via Well's criteria and D-dimer is negative via age adjusted criteria. Low concern for myocarditis with no fever, no tachycardia, and no elevated troponin. Low concern for esophageal tear with no recent severe vomiting, no history of recent esophageal instrumentation, no crepitus on exam, or pneumomediastinum on CXR. Low concern for pneumothorax with no signs of this on physical exam or CXR. She is discharged in good condition with normal vitals and she is given commonsense return precautions which she verbalizes understanding of.         Amount and/or Complexity of Data Reviewed  Clinical lab tests: ordered and reviewed  Tests in the radiology section of CPT®: ordered and reviewed  Decide to obtain previous medical records or to obtain history from someone other than the patient: yes  Independent visualization of images, tracings, or specimens: yes (EKG shows sinus rhythm at a rate of 86 with no ST elevation or depression concerning for acute ischemia, narrow QRS, MA within normal limits, QT is prolonged.  Prolonged QT also seen on ECG obtained in May 2019.)    Risk of Complications, Morbidity, and/or Mortality  Presenting problems: high  Diagnostic procedures: moderate  Management options: high    Patient Progress  Patient progress: improved      Final diagnoses:   Panic attack   Precordial pain            Ramez Khan MD  02/20/21 1311       Ramez Khan MD  02/20/21 1312

## 2021-02-21 ENCOUNTER — HOSPITAL ENCOUNTER (INPATIENT)
Age: 66
LOS: 4 days | Discharge: HOME | DRG: 751 | End: 2021-02-25
Attending: EMERGENCY MEDICINE | Admitting: PSYCHIATRY & NEUROLOGY
Payer: MEDICAID

## 2021-02-21 DIAGNOSIS — F32.A DEPRESSION WITH SUICIDAL IDEATION: Primary | ICD-10-CM

## 2021-02-21 DIAGNOSIS — R45.851 DEPRESSION WITH SUICIDAL IDEATION: Primary | ICD-10-CM

## 2021-02-21 LAB
ALBUMIN SERPL-MCNC: 4.4 G/DL (ref 3.5–5.2)
ALP BLD-CCNC: 76 U/L (ref 35–104)
ALT SERPL-CCNC: 16 U/L (ref 5–33)
AMPHETAMINE SCREEN, URINE: NEGATIVE
ANION GAP SERPL CALCULATED.3IONS-SCNC: 15 MMOL/L (ref 7–19)
AST SERPL-CCNC: 20 U/L (ref 5–32)
BACTERIA: NEGATIVE /HPF
BARBITURATE SCREEN URINE: NEGATIVE
BASOPHILS ABSOLUTE: 0 K/UL (ref 0–0.2)
BASOPHILS RELATIVE PERCENT: 0.5 % (ref 0–1)
BENZODIAZEPINE SCREEN, URINE: NEGATIVE
BILIRUB SERPL-MCNC: 0.3 MG/DL (ref 0.2–1.2)
BILIRUBIN URINE: NEGATIVE
BLOOD, URINE: NEGATIVE
BUN BLDV-MCNC: 17 MG/DL (ref 8–23)
CALCIUM SERPL-MCNC: 8.7 MG/DL (ref 8.8–10.2)
CANNABINOID SCREEN URINE: NEGATIVE
CHLORIDE BLD-SCNC: 96 MMOL/L (ref 98–111)
CLARITY: CLEAR
CO2: 22 MMOL/L (ref 22–29)
COCAINE METABOLITE SCREEN URINE: NEGATIVE
COLOR: YELLOW
CREAT SERPL-MCNC: 1.1 MG/DL (ref 0.5–0.9)
CRYSTALS, UA: ABNORMAL /HPF
EOSINOPHILS ABSOLUTE: 0.1 K/UL (ref 0–0.6)
EOSINOPHILS RELATIVE PERCENT: 1 % (ref 0–5)
EPITHELIAL CELLS, UA: 11 /HPF (ref 0–5)
ETHANOL: <10 MG/DL (ref 0–0.08)
GFR AFRICAN AMERICAN: >59
GFR NON-AFRICAN AMERICAN: 50
GLUCOSE BLD-MCNC: 113 MG/DL (ref 74–109)
GLUCOSE URINE: NEGATIVE MG/DL
HCT VFR BLD CALC: 39.3 % (ref 37–47)
HEMOGLOBIN: 13.6 G/DL (ref 12–16)
HYALINE CASTS: 57 /HPF (ref 0–8)
IMMATURE GRANULOCYTES #: 0 K/UL
KETONES, URINE: =>160 MG/DL
LEUKOCYTE ESTERASE, URINE: ABNORMAL
LYMPHOCYTES ABSOLUTE: 1.8 K/UL (ref 1.1–4.5)
LYMPHOCYTES RELATIVE PERCENT: 30.9 % (ref 20–40)
Lab: NORMAL
MCH RBC QN AUTO: 28.9 PG (ref 27–31)
MCHC RBC AUTO-ENTMCNC: 34.6 G/DL (ref 33–37)
MCV RBC AUTO: 83.4 FL (ref 81–99)
MONOCYTES ABSOLUTE: 0.4 K/UL (ref 0–0.9)
MONOCYTES RELATIVE PERCENT: 6.9 % (ref 0–10)
NEUTROPHILS ABSOLUTE: 3.6 K/UL (ref 1.5–7.5)
NEUTROPHILS RELATIVE PERCENT: 60.5 % (ref 50–65)
NITRITE, URINE: NEGATIVE
OPIATE SCREEN URINE: NEGATIVE
PDW BLD-RTO: 12.7 % (ref 11.5–14.5)
PH UA: 5.5 (ref 5–8)
PLATELET # BLD: 252 K/UL (ref 130–400)
PMV BLD AUTO: 9.8 FL (ref 9.4–12.3)
POTASSIUM REFLEX MAGNESIUM: 3.6 MMOL/L (ref 3.5–5)
PROTEIN UA: 30 MG/DL
QT INTERVAL: 432 MS
QTC INTERVAL: 516 MS
RBC # BLD: 4.71 M/UL (ref 4.2–5.4)
RBC UA: 2 /HPF (ref 0–4)
SARS-COV-2, NAAT: NOT DETECTED
SODIUM BLD-SCNC: 133 MMOL/L (ref 136–145)
SPECIFIC GRAVITY UA: 1.03 (ref 1–1.03)
TOTAL PROTEIN: 6.6 G/DL (ref 6.6–8.7)
UROBILINOGEN, URINE: 1 E.U./DL
WBC # BLD: 6 K/UL (ref 4.8–10.8)
WBC UA: 15 /HPF (ref 0–5)

## 2021-02-21 PROCEDURE — 87086 URINE CULTURE/COLONY COUNT: CPT

## 2021-02-21 PROCEDURE — 82077 ASSAY SPEC XCP UR&BREATH IA: CPT

## 2021-02-21 PROCEDURE — 80307 DRUG TEST PRSMV CHEM ANLYZR: CPT

## 2021-02-21 PROCEDURE — 6370000000 HC RX 637 (ALT 250 FOR IP): Performed by: PSYCHIATRY & NEUROLOGY

## 2021-02-21 PROCEDURE — 99285 EMERGENCY DEPT VISIT HI MDM: CPT

## 2021-02-21 PROCEDURE — 36415 COLL VENOUS BLD VENIPUNCTURE: CPT

## 2021-02-21 PROCEDURE — 85025 COMPLETE CBC W/AUTO DIFF WBC: CPT

## 2021-02-21 PROCEDURE — 1240000000 HC EMOTIONAL WELLNESS R&B

## 2021-02-21 PROCEDURE — 80053 COMPREHEN METABOLIC PANEL: CPT

## 2021-02-21 PROCEDURE — 87635 SARS-COV-2 COVID-19 AMP PRB: CPT

## 2021-02-21 PROCEDURE — 81001 URINALYSIS AUTO W/SCOPE: CPT

## 2021-02-21 RX ORDER — QUETIAPINE FUMARATE 100 MG/1
150 TABLET, FILM COATED ORAL 2 TIMES DAILY
Status: ON HOLD | COMMUNITY
End: 2021-02-25 | Stop reason: HOSPADM

## 2021-02-21 RX ORDER — ACETAMINOPHEN 325 MG/1
650 TABLET ORAL EVERY 4 HOURS PRN
Status: DISCONTINUED | OUTPATIENT
Start: 2021-02-21 | End: 2021-02-25 | Stop reason: HOSPADM

## 2021-02-21 RX ORDER — POLYETHYLENE GLYCOL 3350 17 G/17G
17 POWDER, FOR SOLUTION ORAL DAILY PRN
Status: DISCONTINUED | OUTPATIENT
Start: 2021-02-21 | End: 2021-02-25 | Stop reason: HOSPADM

## 2021-02-21 RX ORDER — TRAZODONE HYDROCHLORIDE 50 MG/1
50 TABLET ORAL ONCE
Status: COMPLETED | OUTPATIENT
Start: 2021-02-21 | End: 2021-02-21

## 2021-02-21 RX ORDER — HYDROXYZINE HYDROCHLORIDE 25 MG/1
25 TABLET, FILM COATED ORAL 3 TIMES DAILY PRN
Status: DISCONTINUED | OUTPATIENT
Start: 2021-02-21 | End: 2021-02-22

## 2021-02-21 RX ORDER — MECOBALAMIN 5000 MCG
5 TABLET,DISINTEGRATING ORAL ONCE
Status: COMPLETED | OUTPATIENT
Start: 2021-02-21 | End: 2021-02-21

## 2021-02-21 RX ORDER — FLUOXETINE 10 MG/1
40 CAPSULE ORAL DAILY
Status: ON HOLD | COMMUNITY
End: 2021-02-22

## 2021-02-21 RX ADMIN — HYDROXYZINE HYDROCHLORIDE 25 MG: 25 TABLET, FILM COATED ORAL at 18:38

## 2021-02-21 RX ADMIN — Medication 5 MG: at 21:42

## 2021-02-21 RX ADMIN — TRAZODONE HYDROCHLORIDE 50 MG: 50 TABLET ORAL at 21:42

## 2021-02-21 ASSESSMENT — SLEEP AND FATIGUE QUESTIONNAIRES
AVERAGE NUMBER OF SLEEP HOURS: 2
SLEEP PATTERN: DIFFICULTY FALLING ASLEEP;RESTLESSNESS
DIFFICULTY FALLING ASLEEP: YES
DO YOU USE A SLEEP AID: YES
DO YOU HAVE DIFFICULTY SLEEPING: YES
DIFFICULTY STAYING ASLEEP: YES
RESTFUL SLEEP: NO
DIFFICULTY ARISING: NO

## 2021-02-21 ASSESSMENT — PATIENT HEALTH QUESTIONNAIRE - PHQ9: SUM OF ALL RESPONSES TO PHQ QUESTIONS 1-9: 23

## 2021-02-21 ASSESSMENT — PAIN SCALES - GENERAL
PAINLEVEL_OUTOF10: 0
PAINLEVEL_OUTOF10: 0
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
## 2021-02-21 NOTE — ED PROVIDER NOTES
Attending Supervisory Note/Shared Visit   I have personally performed a face to face diagnostic evaluation on this patient. I have reviewed the mid-levels findings and agree. Briefly, patient's a 78-year-old female presents complaining of anxiety and depression. Having SI. Has not been sleeping well. On exam she is no distress resting comfortably at this time. Creatinine borderline elevated and urine has several casts. Questioned patient about fluid intake. She says she really has not been drinking much in the way of fluids recently. Encouraged her to drink more fluids. She has no urinary symptoms at all so I think that the urinalysis results are probably just from mild dehydration. She has been seen by intake with psychiatry and discussed with on-call psychiatrist who will admit. Patient updated about plan. FINAL IMPRESSION      1.  Depression with suicidal ideation          Tex Mayorga MD  Attending Emergency Physician       Tex Mayorga MD  02/21/21 1744

## 2021-02-21 NOTE — ED PROVIDER NOTES
[facility] [address]  eMERGENCY dEPARTMENT eNCOUnter      Pt Name: Isac Winslow Case  MRN: [id number]  [place] 1955  Date of evaluation: 2/21/2021  Provider: SLIM Coelho    CHIEF COMPLAINT       Chief Complaint   Patient presents with    Anxiety    Suicidal         HISTORY OF PRESENT ILLNESS   (Location/Symptom, Timing/Onset,Context/Setting, Quality, Duration, Modifying Factors, Severity)  Note limiting factors. Gely Race a 60 y.o. female who presents to the emergency department for evaluation of mental health problem. Pt tells me that she has had worsening anxiety and depression. She relates that she currently follows with Dr. Glenn Casas having had hospitalization last month at [facility] reporting suicide attempt at that time by overdosing on medication. She reported suicidal thoughts on arrival however she denies having suicidal thoughts to me. She relates that her son was recently incarcerated for drugs. She denies drug use. She relates that she has had worsening anxiety and that she hasn't been sleeping. She denies fevers as well as constitutional signs of infection. She does not endorse homicidal thoughts or problems with hallucinations to me. HPI    Nursing Notes were reviewed. REVIEW OF SYSTEMS    (2-9 systems for level 4, 10 or more for level 5)     Review of Systems   Psychiatric/Behavioral: Positive for dysphoric mood and suicidal ideas (current denies this to me). The patient is nervous/anxious. All other systems reviewed and are negative. A complete review of systems was performed and is negative except as noted above in the HPI. PAST MEDICAL HISTORY   No past medical history on file. SURGICAL HISTORY     No past surgical history on file.       CURRENT MEDICATIONS       Current Discharge Medication List      CONTINUE these medications which have NOT CHANGED    Details   FLUoxetine (PROZAC) 10 MG capsule Take 40 mg by mouth daily QUEtiapine (SEROQUEL) 100 MG tablet Take 150 mg by mouth 2 times daily             ALLERGIES     Patient has no known allergies. FAMILY HISTORY     No family history on file. SOCIAL HISTORY       Social History     Socioeconomic History    Marital status: Single     Spouse name: Not on file    Number of children: Not on file    Years of education: Not on file    Highest education level: Not on file   Occupational History    Not on file   Social Needs    Financial resource strain: Not on file    Food insecurity     Worry: Not on file     Inability: Not on file    Transportation needs     Medical: Not on file     Non-medical: Not on file   Tobacco Use    Smoking status: Not on file   Substance and Sexual Activity    Alcohol use: Not on file    Drug use: Not on file    Sexual activity: Not on file   Lifestyle    Physical activity     Days per week: Not on file     Minutes per session: Not on file    Stress: Not on file   Relationships    Social connections     Talks on phone: Not on file     Gets together: Not on file     Attends Zoroastrian service: Not on file     Active member of club or organization: Not on file     Attends meetings of clubs or organizations: Not on file     Relationship status: Not on file    Intimate partner violence     Fear of current or ex partner: Not on file     Emotionally abused: Not on file     Physically abused: Not on file     Forced sexual activity: Not on file   Other Topics Concern    Not on file   Social History Narrative    Not on file       SCREENINGS             PHYSICAL EXAM    (up to 7 for level 4, 8 or more for level 5)     ED Triage Vitals   BP Temp Temp src Pulse Resp SpO2 Height Weight   -- 02/21/21 1127 -- 02/21/21 1128 02/21/21 1127 02/21/21 1127 02/21/21 1127 02/21/21 1127    97.8 °F (36.6 °C)  78 18 99 % 5' 5\" (1.651 m) 125 lb (56.7 kg)       Physical Exam  Vitals signs reviewed. HENT:      Head: Normocephalic. Right Ear: External ear normal.      Left Ear: External ear normal.      Mouth/Throat:      Mouth: Mucous membranes are moist.      Pharynx: Oropharynx is clear. Eyes:      Conjunctiva/sclera: Conjunctivae normal.      Pupils: Pupils are equal, round, and reactive to light. Neck:      Musculoskeletal: Normal range of motion. Cardiovascular:      Rate and Rhythm: Normal rate and regular rhythm. Heart sounds: Normal heart sounds. Pulmonary:      Effort: Pulmonary effort is normal.      Breath sounds: Normal breath sounds. Abdominal:      General: Bowel sounds are normal.      Palpations: Abdomen is soft. Musculoskeletal: Normal range of motion. Skin:     General: Skin is warm and dry. Neurological:      Mental Status: She is alert and oriented to person, place, and time.          DIAGNOSTIC RESULTS     EKG: All EKG's are interpreted by the Emergency Department Physician who either signs or Co-signs this chart in the absence of acardiologist.        RADIOLOGY:   Non-plain film images such as CT, Ultrasound andMRI are read by the radiologist. Plain radiographic images are visualized and preliminarily interpreted by the emergency physician with the below findings:        Interpretation per the Radiologist below, if available at the time of this note:    No orders to display         ED BEDSIDE ULTRASOUND:   Performed by ED Physician - none    LABS:  Labs Reviewed   COMPREHENSIVE METABOLIC PANEL W/ REFLEX TO MG FOR LOW K - Abnormal; Notable for the following components:       Result Value    Sodium 133 (*)     Chloride 96 (*)     Glucose 113 (*)     CREATININE 1.1 (*)     GFR Non-African American 50 (*)     Calcium 8.7 (*)     All other components within normal limits   URINE RT REFLEX TO CULTURE - Abnormal; Notable for the following components:    Protein, UA 30 (*)     Leukocyte Esterase, Urine TRACE (*)     All other components within normal limits MICROSCOPIC URINALYSIS - Abnormal; Notable for the following components:    Bacteria, UA NEGATIVE (*)     Crystals, UA NEG (*)     Hyaline Casts, UA 57 (*)     WBC, UA 15 (*)     All other components within normal limits   COVID-19, RAPID   CULTURE, URINE   CBC WITH AUTO DIFFERENTIAL   URINE DRUG SCREEN   ETHANOL       All other labs were within normal range or not returned as of this dictation. RE-ASSESSMENT     Pt has been evaluated by mental health professional in conjunction with Dr. Cash Woodson who accepts patient for admission to adult behavioral health unit. EMERGENCY DEPARTMENT COURSE and DIFFERENTIALDIAGNOSIS/MDM:   Vitals:    Vitals:    02/21/21 1128 02/21/21 1134 02/21/21 1523 02/21/21 1525   BP:  115/79  113/77   Pulse: 78  87 87   Resp:   16 16   Temp:    97.9 °F (36.6 °C)   TempSrc:   Temporal Temporal   SpO2:   95% 95%   Weight:    124 lb 0.4 oz (56.3 kg)   Height:    5' 5\" (1.651 m)       MDM      CONSULTS:  IP CONSULT TO PSYCHIATRY    PROCEDURES:  Unless otherwise notedbelow, none     Procedures    FINAL IMPRESSION     1. Depression with suicidal ideation          DISPOSITION/PLAN   DISPOSITION        PATIENT REFERRED TO:  No follow-up provider specified.     DISCHARGE MEDICATIONS:       Current Discharge Medication List          (Pleasenote that portions of this note were completed with a voice recognition program.  Efforts were made to edit the dictations but occasionally words are mis-transcribed.)              Claudia Villaseñor, APRN  02/21/21 4162
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
## 2021-02-22 PROBLEM — F33.2 MAJOR DEPRESSIVE DISORDER, RECURRENT SEVERE WITHOUT PSYCHOTIC FEATURES (HCC): Status: ACTIVE | Noted: 2021-02-22

## 2021-02-22 PROCEDURE — 99223 1ST HOSP IP/OBS HIGH 75: CPT | Performed by: PSYCHIATRY & NEUROLOGY

## 2021-02-22 PROCEDURE — 1240000000 HC EMOTIONAL WELLNESS R&B

## 2021-02-22 PROCEDURE — 6370000000 HC RX 637 (ALT 250 FOR IP): Performed by: PSYCHIATRY & NEUROLOGY

## 2021-02-22 RX ORDER — TRAZODONE HYDROCHLORIDE 50 MG/1
50 TABLET ORAL NIGHTLY
Status: DISCONTINUED | OUTPATIENT
Start: 2021-02-22 | End: 2021-02-23

## 2021-02-22 RX ORDER — MECOBALAMIN 5000 MCG
5 TABLET,DISINTEGRATING ORAL NIGHTLY
Status: DISCONTINUED | OUTPATIENT
Start: 2021-02-22 | End: 2021-02-25 | Stop reason: HOSPADM

## 2021-02-22 RX ORDER — PRAZOSIN HYDROCHLORIDE 1 MG/1
1 CAPSULE ORAL NIGHTLY
Status: ON HOLD | COMMUNITY
End: 2021-02-25 | Stop reason: HOSPADM

## 2021-02-22 RX ORDER — FLUOXETINE HYDROCHLORIDE 20 MG/1
40 CAPSULE ORAL DAILY
Status: DISCONTINUED | OUTPATIENT
Start: 2021-02-23 | End: 2021-02-25 | Stop reason: HOSPADM

## 2021-02-22 RX ORDER — FLUOXETINE HYDROCHLORIDE 20 MG/1
40 CAPSULE ORAL DAILY
Status: DISCONTINUED | OUTPATIENT
Start: 2021-02-22 | End: 2021-02-22

## 2021-02-22 RX ORDER — HYDROXYZINE HYDROCHLORIDE 25 MG/1
25 TABLET, FILM COATED ORAL 3 TIMES DAILY PRN
Status: DISCONTINUED | OUTPATIENT
Start: 2021-02-22 | End: 2021-02-25 | Stop reason: HOSPADM

## 2021-02-22 RX ORDER — FLUOXETINE HYDROCHLORIDE 40 MG/1
40 CAPSULE ORAL DAILY
Status: ON HOLD | COMMUNITY
End: 2021-02-25 | Stop reason: HOSPADM

## 2021-02-22 RX ADMIN — TRAZODONE HYDROCHLORIDE 50 MG: 50 TABLET ORAL at 20:37

## 2021-02-22 RX ADMIN — HYDROXYZINE HYDROCHLORIDE 25 MG: 25 TABLET, FILM COATED ORAL at 05:51

## 2021-02-22 RX ADMIN — HYDROXYZINE HYDROCHLORIDE 25 MG: 25 TABLET, FILM COATED ORAL at 16:24

## 2021-02-22 RX ADMIN — Medication 5 MG: at 20:37

## 2021-02-22 ASSESSMENT — PAIN SCALES - GENERAL: PAINLEVEL_OUTOF10: 0

## 2021-02-22 NOTE — PLAN OF CARE
Problem: Altered Mood, Depressive Behavior:  Goal: Able to verbalize acceptance of life and situations over which he or she has no control  Description: Able to verbalize acceptance of life and situations over which he or she has no control  2/22/2021 1600 by Derrick Sotelo  Outcome: Ongoing  Note:                                                                     Group Therapy Note    Date: 2/22/2021  Start Time: 1430  End Time:  1530  Number of Participants: 6    Type of Group: Cognitive Skills    Wellness Binder Information  Module Name:  Women's Issues  Session Number:  4    Group Goal for Pt: To raise awareness of how thoughts influence feelings    Notes:  Pt demonstrated improved awareness of how thoughts influence feelings by actively participating in group discussion. Status After Intervention:  Unchanged    Participation Level:  Active Listener and Interactive    Participation Quality: Appropriate and Attentive      Speech:  normal      Thought Process/Content: Logical      Affective Functioning: Congruent      Mood: anxious and depressed      Level of consciousness:  Alert and Oriented x4      Response to Learning: Able to verbalize current knowledge/experience, Able to verbalize/acknowledge new learning, and Progressing to goal      Endings: None Reported    Modes of Intervention: Education      Discipline Responsible: Psychoeducational Specialist      Signature:  Derrick Sotelo

## 2021-02-22 NOTE — PROGRESS NOTES
Group Note    Number of Participants in Group: 3  Number of Patients on Unit:5      Patient attended group:No  Reason for Absence:pt arrived on the unit today and was sleeping.   Intervention for patient absence: one to one with staff       Type of Group:   Wrap-Up/Relaxation    Patient's Goal: See wrap up group sheet    Participation Level:    interactive          Patient Response to Learning: Yes positive    Patient's Behavior: Anxious and Cooperative    Is Patient Social/Interacting: No    Relaxation:   Television:No   Reading:No   Game/Puzzle:No   Phone: No       Notes/Comments:       Please see patient's wrap up group sheet for patient's comments

## 2021-02-22 NOTE — PROGRESS NOTES
Allyssa Wright  : 1977   MRN: [id number]  Date: 2017     Psychiatry - ECT  Discharge Summary    Admit Date: 2017  5:50 AM  Discharge Date: 2017    Attending Physician: Shoaib Boyce MD    Discharge Provider: Flaco Walsh MD    History of Present Illness: Allyssa Wright is a 40 y.o. female with Major Depressive Disorder, recurrent, in partial remission presented for ECT #24. See H&P dated 2017 for full HPI. For further details, see Dr. Boyce's pre-ECT evaluation.    Hospital Course: The patient tolerated the ECT treatment well without complication. Patient was stable post-procedure. See OP note dated 2017 for more details.     Disposition: Home or Self Care    Medications:  Current Discharge Medication List      CONTINUE these medications which have NOT CHANGED    Details   brimonidine (MIRVASO) 0.33 % Gel Apply topically nightly as needed.      dapsone 7.5 % GlwP Apply topically once daily.      famciclovir (FAMVIR) 500 MG tablet Take 1 tablet (500 mg total) by mouth 2 (two) times daily.  Qty: 30 tablet, Refills: 12    Associated Diagnoses: Major depressive disorder, recurrent episode, moderate degree      quetiapine (SEROQUEL) 50 MG tablet Take 150 mg by mouth 2 (two) times daily. 150 mg nightly and 25 mg in the morning      spironolactone (ALDACTONE) 50 MG tablet 50 mg 2 (two) times daily. 50  mg qAM, 50 mg qHS.      thyroid (ARMOUR THYROID) 30 mg Tab Take 1 tablet (30 mg total) by mouth every morning.  Qty: 30 tablet, Refills: 11    Associated Diagnoses: Major depressive disorder, recurrent episode, moderate degree      trazodone (DESYREL) 100 MG tablet Take 300 mg by mouth every evening.       venlafaxine (EFFEXOR-XR) 75 MG 24 hr capsule Take 1 capsule (75 mg total) by mouth nightly. Take with 150 mg for total daily dose of 225 mg.  Qty: 30 capsule, Refills: 2      dextroamphetamine-amphetamine (ADDERALL XR) 20 MG 24 hr capsule Take 20 mg by mouth 2 (two) times daily.     RT leisure assessment is complete. Pt will be encouraged to attend scheduled group activities to address leisure and social deficits.   hydrOXYzine pamoate (VISTARIL) 25 MG Cap Take 1 capsule (25 mg total) by mouth every 8 (eight) hours as needed (anxiety).  Qty: 90 capsule, Refills: 1      naftifine (NAFTIN) 1 % cream Apply topically daily as needed. Apply to feet      ZOVIRAX 5 % Crea Refills: 5           Status at Discharge: Alert and medically stable    Discharge Diagnoses:  Major Depressive Disorder, recurrent, in partial remission    Diet: Resume previous outpatient diet  Activity: Ambulate with assistance - patient is a fall risk  Instructions: Please do not eat or drink anything after midnight prior to procedure. Please do not drive on day of ECT.    Med Changes:  None    Next ECT:  Follow-up Information     Follow up with with Dr. Boyce for ECT on May 17 2017.          Flaco Walsh IV, MD  PGY-2 Psychiatry, [facility]  04/20/2017 7:23 AM

## 2021-02-22 NOTE — PROGRESS NOTES
Pt awake sitting on the edge of her bed ,she is complaing of being anxious. Pt has atarax 25 mg prn TID ,will administer atarax 25mg.

## 2021-02-22 NOTE — H&P
Department of Psychiatry  Attending History and Physical        CHIEF COMPLAINT:  \"I am depressed\"    History obtained from: patient, chart    HISTORY OF PRESENT ILLNESS:    78-year-old white female with history of depression, PTSD and anxiety, new to our service, admitted for suicide attempt by overdose on Prozac and Seroquel. Reportedly, her daughter-in-law called the ambulance. UDS negative, BAL less than 10. Noted mild MAGNUS and hyponatremia. No evidence of UTI. Patient has been calm and cooperative on the unit. Medications Prior to Admission:   Prior to Admission medications    Medication Sig Start Date End Date Taking? Authorizing Provider   FLUoxetine (PROZAC) 10 MG capsule Take 40 mg by mouth daily   Yes Historical Provider, MD   QUEtiapine (SEROQUEL) 100 MG tablet Take 150 mg by mouth 2 times daily   Yes Historical Provider, MD       Allergies:  Patient has no known allergies. Social History:  , lives alone. 4 children and grandchildren. Family History:   No history of psychiatric illness or suicide attempts. Last worked in the [de-identified]. REVIEW OF SYSTEMS:  General: No fevers, chills, night sweats, no recent weight loss or gain. Head: No headache, no migraine. Eyes: No recent visual changes. Ears: No recent hearing changes. Nose: No increased congestion or change in sense of smell. Throat: No sore throat, no trouble swallowing. Cardiovascular: No chest pain or palpitations, or dizziness. Respiratory: No cough, wheezes, congestion, or shortness of breath. Gastrointestinal: No abdominal pain, nausea or vomiting, no diarrhea or constipation. Musculo-skeletal: No edema, deformities, or loss of functions. Neurological: No loss of consciousness, abnormal sensations, or weakness. Skin: No rash, abrasions or bruises. PHYSICAL EXAM:  On observation  GENERAL APPEARANCE: Stated age, in NAD. HEAD: Normocephalic, atraumatic. CHEST: No deformities. MUSCULOSKELTAL: No obvious deformities, clubbing, cyanosis or edema. NEUROLOGICAL: Alert, oriented x 3, CN II-XII grossly intact. No abnormal movements or tremors. Gait stable. SKIN: Warm, dry, intact, no rash, abrasions, bruises. Vitals:  BP 98/72   Pulse 89   Temp 98.3 °F (36.8 °C) (Temporal)   Resp 14   Ht 5' 5\" (1.651 m)   Wt 124 lb 4 oz (56.4 kg)   LMP  (LMP Unknown)   SpO2 94%   BMI 20.68 kg/m²     Mental Status Examination:    Appearance: Stated age, casually dressed. gait stable. No abnormal movements or tremor. Behavior: Calm, cooperative, pleasant. Tearful at times. Speech: Normal in tone, volume, and quality. No slurring, dysarthria or pressured speech noted. Mood: \"Depressed \"   Affect: Mood congruent. Thought Process: Appears linear. Thought Content: Denies suicidal ideation at this time, however, hopeless and helpless. No overt delusions or paranoia appreciated. Perceptions: Denies auditory or visual hallucinations at present time. Not responding to internal stimuli. Concentration: Intact. Orientation: to person, place, date, and situation. Language: Intact. Fund of information: Intact. Memory: Recent and remote appear intact. Neurovegitative: Poor appetite and sleep. Insight: Impaired. Judgment: Impaired. DATA:  Lab Results   Component Value Date    WBC 6.0 02/21/2021    HGB 13.6 02/21/2021    HCT 39.3 02/21/2021    MCV 83.4 02/21/2021     02/21/2021     Lab Results   Component Value Date     (L) 02/21/2021    K 3.6 02/21/2021    CL 96 (L) 02/21/2021    CO2 22 02/21/2021    BUN 17 02/21/2021    CREATININE 1.1 (H) 02/21/2021    GLUCOSE 113 (H) 02/21/2021    CALCIUM 8.7 (L) 02/21/2021    PROT 6.6 02/21/2021    LABALBU 4.4 02/21/2021    BILITOT 0.3 02/21/2021    ALKPHOS 76 02/21/2021    AST 20 02/21/2021    ALT 16 02/21/2021    LABGLOM 50 (A) 02/21/2021    GFRAA >59 02/21/2021       ASSESSMENT AND PLAN:  DSM-5 DIAGNOSIS:   Impression:  Major depressive disorder, recurrent, severe, without psychotic features  Status post suicide attempt  PTSD, chronic    Medical issues   MAGNUS, mild  Hyponatremia, mild    Patient is hopeless and helpless and is meeting the criteria for inpatient psychiatric treatment. Plan:   -Admit to [facility] Unit and monitor on 15 minute checks. Suicide and fall precautions.  Matty Quinones reviewed. -Gather collateral information from family with release.  -Medical monitoring to be performed by Dr. Arun Hannah and associates. Order routine labs. MOCA score 26/30 today. -Acclimate to the unit. Provide supportive psychotherapy.  -Encourage participation in groups and therapeutic activities as appropriate. Work on coping skills. -Medications:    Continue Prozac for depression. Trazodone and melatonin for sleep. Discussed benefits, alternatives and risks involved with care, including - but not limited to - possible adverse effects of dizziness, hypotension, increased risk of falls w/ need to slowly transition between positions, excessive drowsiness, dry mouth, constipation or allergic reaction were discussed with the patient. Further discussed possibility of Serotonin Syndrome (sx including diaphoresis, agitation, muscle tension increase/rigidity, fever) with use of other serotonergic agents. Advised caution in operating vehicles/machinery after taking trazodone if continued as an outpatient.      PRN Atarax for anxiety.     -The risks, benefits, side effects, indications, contraindications, and adverse effects of the medications have been discussed.  -The patient has verbalized understanding and has capacity to give informed consent.  -SW help evaluate home environment and provide outpatient resources.  -Discuss with treatment team.

## 2021-02-22 NOTE — PROGRESS NOTES
Behavioral Services  Medicare Certification Upon Admission    I certify that this patient's inpatient psychiatric hospital admission is medically necessary for:    [x] (1) Treatment which could reasonably be expected to improve this patient's condition,       [] (2) Or for diagnostic study;     AND     [x](2) The inpatient psychiatric services are provided while the individual is under the care of a physician and are included in the individualized plan of care.     Estimated length of stay/service 5 days to 7 weeks  Plan for post-hospital care TBA    Electronically signed by Lucrecia Chen MD on 2/22/2021 at 12:44 PM
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
## 2021-02-22 NOTE — PROGRESS NOTES
[facility] Daily Shift Assessment-Geriatric Unit  Nursing Progress Note      Room: [facility]617-01   Name: Calvin Turcios Case   Age: 60 y.o. Gender: female   Dx: Anxiety and Depression  Precautions: suicide risk and fall risk  Inpatient Status: voluntary     SLEEP:    Sleep: Yes,   Sleep Quality Very good   Hours Slept: 7   Sleep Medications: Yes  PRN Sleep Meds: No       MEDICAL:    Other PRN Meds: No   Med Compliant: Yes   Accu-Chek: No   Oxygen/CPAP/BiPAP: No  CIWA/CINA: No   PAIN Assessment: none  Side Effects from medication: No    The patient was counseled at length about the risks of basia Covid-19 during their hospitalization. The patient was made aware that basia Covid-19 is a possibility when hospitalized, all patient have had a rapid Covid-19 prior to admission to the unit. This however does not mean that there is no exposure to Covid-19. Please wear your mask except when eating when in the common areas or groups. Remember to distance yourself no more than 2 patient per table at meals and groups, sit every other seat when in the TV area or group room. Wash your hands frequently. Report any cough, sore throat, loss of smell or taste, body aches, feeling feverish. The furniture tables and chairs are cleaned every few hours and when visible soiled. Please report any problems with soiled areas as soon as possible. If your mask becomes soiled, wet please let us know and we will replace it, a wet or severely soiled mask is not affective.     PSYCH:     SI denies suicidal ideation    HI Negative for homicidal ideation        AVH:Absent      Depression: 6-7 Anxiety: 6-7       GENERAL:      Appetite: decreased  Social: No Speech: hesitant   Appearance:appropriately dressed, disheveled, looks older and healthy looking  Assistive Devices: noneLevel of Assist: Supervision      GROUP:    Group Participation: Yes  Participation LevelMinimal    Participation QualityAttentive    Notes:
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
## 2021-02-22 NOTE — SUICIDE SAFETY PLAN
SAFETY PLAN    A suicide Safety Plan is a document that supports someone when they are having thoughts of suicide. Warning Signs that indicate a suicidal crisis may be developing: What (situations, thoughts, feelings, body sensations, behaviors, etc.) do you experience that lets you know you are beginning to think about suicide? 1. nervous  2. Sleeping more difficult, even meds  3. Brain overworked, ADD    Internal Coping Strategies:  What things can I do (relaxation techniques, hobbies, physical activities, etc.) to take my mind off my problems without contacting another person? 1. Watch TV with no ads. 2. Talk with my daughter in laws. 3. n/a    People and social settings that provide distraction: Who can I call or where can I go to distract me? 1. Name: [place] Benítez Daughter in law. Phone: [phone]  2. Name: [facility]                             Phone: N/A   3. Place: My Room            4. Place: n/a    People whom I can ask for help: Who can I call when I need help - for example, friends, family, clergy, someone else? 1. Name: Chaim Stanley          Phone: na  2. Name: Britanyjad Ramirez         Phone: n/a  3. Name: Elina Erica in [place]. Phone: na    Professionals or [address] [place] [address] I can contact during a crisis: Who can I call for help - for example, my doctor, my psychiatrist, my psychologist, a mental health provider, a suicide hotline? 1. Clinician Name: [id number] EL[address] Lan   Phone: [phone]      Clinician Pager or Emergency Contact #: [phone]    2. Clinician Name: [address]   Phone: [phone]      Clinician Pager or Emergency Contact #: [phone]    3. Suicide Prevention Lifeline: 1-590-909-TALK (1962)    4.  [address] [place] [address] Emergency Services -  for example, [facility] suicide hotline, [facility] Hotline: B1002776      Emergency Services Address: [address] [address] Emergency Services Phone: [phone]    Making the environment safe: How can I make my environment (house/apartment/living space) safer? For example, can I remove guns, medications, and other items? 1. Remove weapons from the home. 2. Remove extra medications from the house.

## 2021-02-22 NOTE — PLAN OF CARE
Problem: Falls - Risk of:  Goal: Will remain free from falls  Description: Will remain free from falls  Outcome: Ongoing     Problem: Mood - Altered:  Goal: Mood stable  Description: Mood stable  Outcome: Ongoing     Problem: Altered Mood, Depressive Behavior:  Goal: Able to verbalize acceptance of life and situations over which he or she has no control  Description: Able to verbalize acceptance of life and situations over which he or she has no control  Outcome: Ongoing  Goal: Able to verbalize and/or display a decrease in depressive symptoms  Description: Able to verbalize and/or display a decrease in depressive symptoms  Outcome: Ongoing  Goal: Absence of self-harm  Description: Absence of self-harm  Outcome: Ongoing     Problem: Pain:  Goal: Pain level will decrease  Description: Pain level will decrease  Outcome: Ongoing  Goal: Control of acute pain  Description: Control of acute pain  Outcome: Ongoing  Goal: Control of chronic pain  Description: Control of chronic pain  Outcome: Ongoing     Problem: Anxiety:  Goal: Level of anxiety will decrease  Description: Level of anxiety will decrease  Outcome: Ongoing
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
## 2021-02-22 NOTE — PLAN OF CARE
Problem: Altered Mood, Depressive Behavior:  Goal: Able to verbalize acceptance of life and situations over which he or she has no control  Description: Able to verbalize acceptance of life and situations over which he or she has no control  2/22/2021 1156 by Darby David  Outcome: Ongoing  Note:                                                                     Group Therapy Note    Date: 2/22/2021  Start Time: 1000  End Time:  1100  Number of Participants: 5    Type of Group: Psychoeducation    Wellness Binder Information  Module Name:  Men's Issues  Session Number:  1    Group Goal for Pt: To improve knowledge of effective stress management techniques    Notes:  Pt demonstrated improved knowledge of effective stress management techniques by actively participating in group discussion. Status After Intervention:  Unchanged    Participation Level:  Active Listener and Interactive    Participation Quality: Appropriate and Attentive      Speech:  normal      Thought Process/Content: Logical      Affective Functioning: Congruent      Mood: anxious and depressed      Level of consciousness:  Alert and Oriented x4      Response to Learning: Able to verbalize current knowledge/experience, Able to verbalize/acknowledge new learning, and Progressing to goal      Endings: None Reported    Modes of Intervention: Education      Discipline Responsible: Psychoeducational Specialist      Signature:  Darby David

## 2021-02-23 LAB
ANION GAP SERPL CALCULATED.3IONS-SCNC: 10 MMOL/L (ref 7–19)
BUN BLDV-MCNC: 13 MG/DL (ref 8–23)
CALCIUM SERPL-MCNC: 8.7 MG/DL (ref 8.8–10.2)
CHLORIDE BLD-SCNC: 102 MMOL/L (ref 98–111)
CHOLESTEROL, TOTAL: 229 MG/DL (ref 160–199)
CO2: 28 MMOL/L (ref 22–29)
CREAT SERPL-MCNC: 0.7 MG/DL (ref 0.5–0.9)
GFR AFRICAN AMERICAN: >59
GFR NON-AFRICAN AMERICAN: >60
GLUCOSE BLD-MCNC: 107 MG/DL (ref 74–109)
HBA1C MFR BLD: 5.5 % (ref 4–6)
HDLC SERPL-MCNC: 43 MG/DL (ref 65–121)
LDL CHOLESTEROL CALCULATED: 165 MG/DL
POTASSIUM SERPL-SCNC: 3.5 MMOL/L (ref 3.5–5)
SODIUM BLD-SCNC: 140 MMOL/L (ref 136–145)
TRIGL SERPL-MCNC: 105 MG/DL (ref 0–149)
TSH REFLEX FT4: 0.65 UIU/ML (ref 0.35–5.5)
URINE CULTURE, ROUTINE: NORMAL
VITAMIN B-12: 1479 PG/ML (ref 211–946)
VITAMIN D 25-HYDROXY: 9.9 NG/ML

## 2021-02-23 PROCEDURE — 82306 VITAMIN D 25 HYDROXY: CPT

## 2021-02-23 PROCEDURE — 6370000000 HC RX 637 (ALT 250 FOR IP): Performed by: PSYCHIATRY & NEUROLOGY

## 2021-02-23 PROCEDURE — 80061 LIPID PANEL: CPT

## 2021-02-23 PROCEDURE — 83036 HEMOGLOBIN GLYCOSYLATED A1C: CPT

## 2021-02-23 PROCEDURE — 84443 ASSAY THYROID STIM HORMONE: CPT

## 2021-02-23 PROCEDURE — 36415 COLL VENOUS BLD VENIPUNCTURE: CPT

## 2021-02-23 PROCEDURE — 99233 SBSQ HOSP IP/OBS HIGH 50: CPT | Performed by: PSYCHIATRY & NEUROLOGY

## 2021-02-23 PROCEDURE — 1240000000 HC EMOTIONAL WELLNESS R&B

## 2021-02-23 PROCEDURE — 82607 VITAMIN B-12: CPT

## 2021-02-23 PROCEDURE — 80048 BASIC METABOLIC PNL TOTAL CA: CPT

## 2021-02-23 RX ORDER — TRAZODONE HYDROCHLORIDE 100 MG/1
100 TABLET ORAL NIGHTLY
Status: DISCONTINUED | OUTPATIENT
Start: 2021-02-23 | End: 2021-02-24

## 2021-02-23 RX ORDER — ERGOCALCIFEROL 1.25 MG/1
50000 CAPSULE ORAL WEEKLY
Status: DISCONTINUED | OUTPATIENT
Start: 2021-02-23 | End: 2021-02-25 | Stop reason: HOSPADM

## 2021-02-23 RX ADMIN — HYDROXYZINE HYDROCHLORIDE 25 MG: 25 TABLET, FILM COATED ORAL at 12:36

## 2021-02-23 RX ADMIN — ERGOCALCIFEROL 50000 UNITS: 1.25 CAPSULE ORAL at 11:59

## 2021-02-23 RX ADMIN — FLUOXETINE HYDROCHLORIDE 40 MG: 20 CAPSULE ORAL at 07:53

## 2021-02-23 RX ADMIN — ACETAMINOPHEN 650 MG: 325 TABLET ORAL at 13:40

## 2021-02-23 RX ADMIN — TRAZODONE HYDROCHLORIDE 100 MG: 100 TABLET ORAL at 20:32

## 2021-02-23 RX ADMIN — Medication 5 MG: at 20:32

## 2021-02-23 RX ADMIN — ACETAMINOPHEN 650 MG: 325 TABLET ORAL at 18:06

## 2021-02-23 ASSESSMENT — PAIN DESCRIPTION - DESCRIPTORS: DESCRIPTORS: SHARP

## 2021-02-23 ASSESSMENT — PAIN SCALES - GENERAL: PAINLEVEL_OUTOF10: 6

## 2021-02-23 NOTE — PROGRESS NOTES
[facility] Daily Shift Assessment-Geriatric Unit  Nursing Progress Note          Room: [facility]/617-01   Name: Barbara Lee Case   Age: 60 y.o. Gender: female   Dx: <principal problem not specified>  Precautions: suicide risk and fall risk  Inpatient Status: voluntary     SLEEP:    Sleep: Yes,   Sleep Quality Fair   Hours Slept:    Sleep Medications: Yes  PRN Sleep Meds: No       MEDICAL:      Other PRN Meds: No   Med Compliant: Yes   Accu-Chek: No   Oxygen/CPAP/BiPAP: No  CIWA/CINA: No   PAIN Assessment: none  Side Effects from medication: No    Is Patient experiencing any respiratory symptoms (headache, fever, body aches, cough. Lysbeth Carrel ): no  Patient educated by nursing to practice social distancing, wear masks, wash hands frequently: yes      Metabolic Screening:    No results found for: LABA1C    No results found for: CHOL  No results found for: TRIG  No results found for: HDL  No components found for: LDLCAL  No results found for: LABVLDL      Body mass index is 20.68 kg/m². BP Readings from Last 2 Encounters:   02/22/21 98/69         PSYCH:     SI denies suicidal ideation    HI Negative for homicidal ideation        AVH:Absent      Depression: 3 Anxiety: 4-5       GENERAL:      Appetite: decreased  Social: No Speech: normal   Appearance:appropriately dressed  Assistive Devices: noneLevel of Assist: Independent      GROUP:    Group Participation: Yes  Participation LevelActive Listener    Participation QualityAppropriate    Notes:     Patient has been in the television this evening. She has not been social with any of her peers this evening. She denies any pain. She reports her depression as low and anxiety as a little higher. She reports that the anxiety medication has helped her that she received earlier in the day.        Electronically signed by Asif Ocasio RN on 2/23/21 at 2:37 AM CST

## 2021-02-23 NOTE — PROGRESS NOTES
Requirement Note     SW met with pt to complete Psychosocial and CSSR-S on this date. Patients long and short term goals discussed. Patient voiced understanding. Treatment plan sheet signed. Patient verbalized understanding of the treatment plan. Patient participated in goals and objectives of the treatment plan. Patient completed safety plan with , patient received copy of plan, and original was placed into patient's chart. In the last 6 months has the pt been a danger to self: NO  In the last 6 months has the pt been a danger to others: NO  Legal Guardian/POA: NO     Provided patient with [employer] handout entitled \"Quitting Smoking. \"  Reviewed handout with patient: addressing dangers of smoking, developing coping skills, and providing basic information about quitting. Patient received all components practical counseling of tobacco practical counseling during the hospital stay.

## 2021-02-23 NOTE — PROGRESS NOTES
[facility] Daily Shift Assessment-Geriatric Unit  Nursing Progress Note          Room: [facility] [place]617-01   Name: Osiris Jeffers Case   Age: 60 y.o. Gender: female   Dx: Major depression disorder recurrent severe without psychotic features. Precautions: suicide risk and fall risk  Inpatient Status: voluntary     SLEEP:    Sleep: No,   Sleep Quality Fair   Hours Slept: 4   Sleep Medications: Yes  PRN Sleep Meds: Yes       MEDICAL:      Other PRN Meds: No   Med Compliant: Yes   Accu-Chek: No   Oxygen/CPAP/BiPAP: No  CIWA/CINA: No   PAIN Assessment: none  Side Effects from medication: No    Is Patient experiencing any respiratory symptoms (headache, fever, body aches, cough. Grace Dues ): no  Patient educated by nursing to practice social distancing, wear masks, wash hands frequently: no      Metabolic Screening:    Lab Results   Component Value Date    LABA1C 5.5 02/23/2021       Lab Results   Component Value Date    CHOL 229 (H) 02/23/2021     Lab Results   Component Value Date    TRIG 105 02/23/2021     Lab Results   Component Value Date    HDL 43 (L) 02/23/2021     No components found for: LDLCAL  No results found for: LABVLDL      Body mass index is 20.68 kg/m². BP Readings from Last 2 Encounters:   02/23/21 96/68         PSYCH:     SI denies suicidal ideation    HI Negative for homicidal ideation        AVH:Absent      Depression: Pt. Doesn't rate. Anxiety: Pt. Doesn't rate. GENERAL:      Appetite: improved  Social: on fringes of meilu.  Speech: normal   Appearance:appropriately dressed, appropriately groomed and healthy looking  Assistive Devices: noneLevel of Assist: Independent      GROUP:    Group Participation: Yes  Participation LevelActive Listener    Participation QualityAttentive    Notes:
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
## 2021-02-23 NOTE — PLAN OF CARE
Problem: Altered Mood, Depressive Behavior:  Goal: Able to verbalize acceptance of life and situations over which he or she has no control  Description: Able to verbalize acceptance of life and situations over which he or she has no control  Outcome: Ongoing  Note:                                                                     Group Therapy Note    Date: 2/23/2021  Start Time: 1000  End Time:  1115  Number of Participants: 5    Type of Group: Psychoeducation    Wellness Binder Information  Module Name:  Women's Issues  Session Number:  1    Group Goal for Pt: To raise awareness of the effectiveness of assertive communication    Notes:  Pt demonstrated improved awareness of the effectiveness of assertive communication by actively participating in group discussion. Status After Intervention:  Unchanged    Participation Level:  Active Listener and Interactive    Participation Quality: Appropriate and Attentive      Speech:  normal      Thought Process/Content: Logical      Affective Functioning: Congruent      Mood: anxious and depressed      Level of consciousness:  Alert and Oriented x4      Response to Learning: Able to verbalize current knowledge/experience, Able to verbalize/acknowledge new learning, and Progressing to goal      Endings: None Reported    Modes of Intervention: Education      Discipline Responsible: Psychoeducational Specialist      Signature:  Hill Das

## 2021-02-23 NOTE — H&P
HISTORY and PHYSICAL      CHIEF COMPLAINT:  Depression, SA    Reason for Admission:  Depression, SA    History Obtained From:  Patient, chart    HISTORY OF PRESENT ILLNESS:      The patient is a 60 y.o. female who is admitted to the [place] [address] unit with worsening mood issues. She has no c/o CP or SOA. No abdominal pain or N/V. No dysuria. No weakness or HA. No fevers. Past Medical History:    No past medical history on file. Past Surgical History:    No past surgical history on file. Medications Prior to Admission:    Medications Prior to Admission: QUEtiapine (SEROQUEL) 100 MG tablet, Take 150 mg by mouth 2 times daily Not taking as home med  prazosin (MINIPRESS) 1 MG capsule, Take 1 mg by mouth nightly  FLUoxetine (PROZAC) 40 MG capsule, Take 40 mg by mouth daily  [DISCONTINUED] FLUoxetine (PROZAC) 10 MG capsule, Take 40 mg by mouth daily    Allergies:  Patient has no known allergies. Social History:   TOBACCO:   has no history on file for tobacco.  ETOH:   reports previous alcohol use. DRUGS:   reports previous drug use. MARITAL STATUS:    OCCUPATION:  Not working  Patient currently lives with family       Family History:   No family history on file. REVIEW OF SYSTEMS:  Constitutional: neg  CV: neg  Pulmonary: neg  GI: neg  : neg  Psych: depression, SA  Neuro: neg  Skin: neg  MusculoSkeletal: neg  HEENT: neg  Joints: neg    Vitals:  BP 98/69   Pulse 66   Temp 96.6 °F (35.9 °C) (Temporal)   Resp 16   Ht 5' 5\" (1.651 m)   Wt 124 lb 4 oz (56.4 kg)   LMP  (LMP Unknown)   SpO2 95%   BMI 20.68 kg/m²     PHYSICAL EXAM:  Gen: NAD, alert  HEENT: WNL  Lymph: no LAD  Neck: no JVD or masses  Chest: CTA bilat  CV: RRR  Abdomen: NT/ND  Extrem: no C/C/E  Neuro: non focal  Skin: no rashes  Joints: no redness    DATA:  I have reviewed the admission labs and imaging tests.     ASSESSMENT AND PLAN:      Active Problems:    Major depressive disorder, recurrent severe without psychotic features---follow with Psych    Hyperglycemia---check Hardeep Lee MD  11:22 PM 2/22/2021

## 2021-02-23 NOTE — PROGRESS NOTES
SW met with treatment team to discuss pt's progress and setbacks. SW 2 was present. Pt reportedly slept fair last night, with medications, appetite is decreased, attends scheduled group activities, isolative to self, performs ADL's, compliant with medications, behavior has been cooperative, reports mild depression, mild/moderate anxiety, denies SI/HI/AVH, tentative discharge Wednesday/Thursday, SW will attempt to contact family to discuss discharge plans, continue current treatment plan.

## 2021-02-24 PROCEDURE — 99233 SBSQ HOSP IP/OBS HIGH 50: CPT | Performed by: PSYCHIATRY & NEUROLOGY

## 2021-02-24 PROCEDURE — 1240000000 HC EMOTIONAL WELLNESS R&B

## 2021-02-24 PROCEDURE — 6370000000 HC RX 637 (ALT 250 FOR IP): Performed by: PSYCHIATRY & NEUROLOGY

## 2021-02-24 RX ORDER — TRAZODONE HYDROCHLORIDE 50 MG/1
50 TABLET ORAL NIGHTLY
Status: DISCONTINUED | OUTPATIENT
Start: 2021-02-24 | End: 2021-02-25 | Stop reason: HOSPADM

## 2021-02-24 RX ORDER — ASENAPINE MALEATE 2.5 MG/1
2.5 TABLET SUBLINGUAL 3 TIMES DAILY PRN
Status: DISCONTINUED | OUTPATIENT
Start: 2021-02-24 | End: 2021-02-25 | Stop reason: HOSPADM

## 2021-02-24 RX ADMIN — ASENAPINE MALEATE 2.5 MG: 2.5 TABLET SUBLINGUAL at 09:38

## 2021-02-24 RX ADMIN — Medication 5 MG: at 21:05

## 2021-02-24 RX ADMIN — ASENAPINE MALEATE 2.5 MG: 2.5 TABLET SUBLINGUAL at 22:31

## 2021-02-24 RX ADMIN — ACETAMINOPHEN 650 MG: 325 TABLET ORAL at 18:02

## 2021-02-24 RX ADMIN — HYDROXYZINE HYDROCHLORIDE 25 MG: 25 TABLET, FILM COATED ORAL at 08:11

## 2021-02-24 RX ADMIN — TRAZODONE HYDROCHLORIDE 50 MG: 50 TABLET ORAL at 21:05

## 2021-02-24 RX ADMIN — MAGNESIUM HYDROXIDE 30 ML: 400 SUSPENSION ORAL at 09:37

## 2021-02-24 RX ADMIN — FLUOXETINE HYDROCHLORIDE 40 MG: 20 CAPSULE ORAL at 08:12

## 2021-02-24 ASSESSMENT — PAIN SCALES - GENERAL
PAINLEVEL_OUTOF10: 0
PAINLEVEL_OUTOF10: 3

## 2021-02-24 ASSESSMENT — PAIN DESCRIPTION - FREQUENCY: FREQUENCY: INTERMITTENT

## 2021-02-24 ASSESSMENT — PAIN DESCRIPTION - ONSET: ONSET: ON-GOING

## 2021-02-24 ASSESSMENT — PAIN DESCRIPTION - PROGRESSION: CLINICAL_PROGRESSION: GRADUALLY IMPROVING

## 2021-02-24 ASSESSMENT — PAIN DESCRIPTION - ORIENTATION: ORIENTATION: LOWER

## 2021-02-24 NOTE — PLAN OF CARE
Problem: Altered Mood, Depressive Behavior:  Goal: Able to verbalize acceptance of life and situations over which he or she has no control  Description: Able to verbalize acceptance of life and situations over which he or she has no control  2/24/2021 1551 by Asiya Rodriguez  Outcome: Ongoing  Note:                                                                     Group Therapy Note    Date: 2/24/2021  Start Time: 1430  End Time:  1530  Number of Participants: 6    Type of Group: Cognitive Skills    Wellness Binder Information  Module Name:  Stress  Session Number:  5    Group Goal for Pt: To raise awareness of the effectiveness of diversionary activities    Notes:  Pt demonstrated improved awareness of the effectiveness of diversionary activities by actively participating in group discussion. Status After Intervention:  Unchanged    Participation Level:  Active Listener and Interactive    Participation Quality: Appropriate and Attentive      Speech:  normal      Thought Process/Content: Logical      Affective Functioning: Congruent      Mood: anxious and depressed      Level of consciousness:  Alert and Oriented x4      Response to Learning: Able to verbalize current knowledge/experience, Able to verbalize/acknowledge new learning, and Progressing to goal      Endings: None Reported    Modes of Intervention: Education      Discipline Responsible: Psychoeducational Specialist      Signature:  Asiya Rodriguez

## 2021-02-24 NOTE — PROGRESS NOTES
Group Note    Number of Participants in Group: 6  Number of Patients on Unit:6      Patient attended group:Yes  Reason for Absence:  Intervention for patient absence:        Type of Group:   Wrap-Up/Relaxation    Patient's Goal: See wrap up group sheet    Participation Level:     Active Listener           Patient Response to Learning: Yes    Patient's Behavior: Cooperative    Is Patient Social/Interacting: Yes    Relaxation:   Television:Yes   Reading:Yes   Game/Puzzle:No   Phone: No       Notes/Comments:      Please see patient's wrap up group sheet for patient's comments       Electronically signed by Live Coker RN on 2/23/21 at 10:52 PM CST

## 2021-02-24 NOTE — PROGRESS NOTES
SW met with treatment team to discuss pt's progress and setbacks. SW 2 was present. Pt reportedly slept fair last night, with medications, appetite is improved, attends scheduled group activities, social with peers/staff, performs ADL's, compliant with medications, behavior has been pleasant/cooperative, reports mild depression, mild to moderate anxiety, denies SI/HI/AVH, tentative discharge Thursday, continue current treatment plan.

## 2021-02-24 NOTE — PROGRESS NOTES
[facility] Daily Shift Assessment-Geriatric Unit  Nursing Progress Note          Room: [address] [place] [address]   Name: Omaira Wilkins Case   Age: 60 y.o. Gender: female   Dx: <principal problem not specified>  Precautions: suicide risk and fall risk  Inpatient Status: voluntary     SLEEP:    Sleep: Yes,   Sleep Quality Fair   Hours Slept:    Sleep Medications: Yes  PRN Sleep Meds: No       MEDICAL:      Other PRN Meds: No   Med Compliant: Yes   Accu-Chek: No   Oxygen/CPAP/BiPAP: No  CIWA/CINA: No   PAIN Assessment: none  Side Effects from medication: No    Is Patient experiencing any respiratory symptoms (headache, fever, body aches, cough. Marilou Linda ): no  Patient educated by nursing to practice social distancing, wear masks, wash hands frequently: yes      Metabolic Screening:    Lab Results   Component Value Date    LABA1C 5.5 02/23/2021       Lab Results   Component Value Date    CHOL 229 (H) 02/23/2021     Lab Results   Component Value Date    TRIG 105 02/23/2021     Lab Results   Component Value Date    HDL 43 (L) 02/23/2021     No components found for: LDLCAL  No results found for: LABVLDL      Body mass index is 20.68 kg/m². BP Readings from Last 2 Encounters:   02/23/21 (!) 88/58         PSYCH:     SI denies suicidal ideation    HI Negative for homicidal ideation        AVH:Absent      Depression: 3-4 Anxiety: 4-5       GENERAL:      Appetite: improved  Social: Yes Speech: normal   Appearance:appropriately dressed  Assistive Devices: bedside commodeLevel of Assist: Independent      GROUP:    Group Participation: Yes  Participation LevelActive Listener    Participation QualityAppropriate    Notes:     Patient has been social and out watching television this evening. She has read some in her room. She is pleasant and soft spoken. She reports improvement since admission.        Electronically signed by Arun Wharton RN on 2/24/21 at 4:28 AM CST

## 2021-02-24 NOTE — PLAN OF CARE
Problem: Falls - Risk of:  Goal: Will remain free from falls  Description: Will remain free from falls  2/24/2021 7031 by Evy Anne RN  Outcome: Ongoing  2/23/2021 2248 by Naa Gamez RN  Outcome: Ongoing     Problem: Mood - Altered:  Goal: Mood stable  Description: Mood stable  2/24/2021 0922 by Evy Anne RN  Outcome: Ongoing  2/23/2021 2248 by Naa Gamez RN  Outcome: Ongoing     Problem: Altered Mood, Depressive Behavior:  Goal: Able to verbalize acceptance of life and situations over which he or she has no control  Description: Able to verbalize acceptance of life and situations over which he or she has no control  2/24/2021 0922 by Evy Anne RN  Outcome: Ongoing  2/23/2021 2248 by Naa Gamez RN  Outcome: Ongoing  Goal: Able to verbalize and/or display a decrease in depressive symptoms  Description: Able to verbalize and/or display a decrease in depressive symptoms  2/24/2021 0922 by Evy Anne RN  Outcome: Ongoing  2/23/2021 2248 by Naa Gamez RN  Outcome: Ongoing  Goal: Absence of self-harm  Description: Absence of self-harm  2/24/2021 0922 by Evy Anne RN  Outcome: Ongoing  2/23/2021 2248 by Naa Gamez RN  Outcome: Ongoing     Problem: Pain:  Goal: Pain level will decrease  Description: Pain level will decrease  2/24/2021 0922 by Evy Anne RN  Outcome: Ongoing  2/23/2021 2248 by Naa Gamez RN  Outcome: Ongoing  Goal: Control of acute pain  Description: Control of acute pain  2/24/2021 0922 by Evy Anne RN  Outcome: Ongoing  2/23/2021 2248 by Naa Gamez RN  Outcome: Ongoing  Goal: Control of chronic pain  Description: Control of chronic pain  2/24/2021 0922 by Evy Anne RN  Outcome: Ongoing  2/23/2021 2248 by Naa Gamez RN  Outcome: Ongoing     Problem: Anxiety:  Goal: Level of anxiety will decrease  Description: Level of anxiety will decrease  2/24/2021 0922 by Evy Anne RN  Outcome: Ongoing  2/23/2021 2248 by Naa Gamez RN Outcome: Ongoing
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
## 2021-02-24 NOTE — PLAN OF CARE
Problem: Altered Mood, Depressive Behavior:  Goal: Able to verbalize acceptance of life and situations over which he or she has no control  Description: Able to verbalize acceptance of life and situations over which he or she has no control  2/24/2021 1245 by Gaby Chacon  Outcome: Ongoing  Note:                                                                     Group Therapy Note    Date: 2/24/2021  Start Time: 1000  End Time:  1100  Number of Participants: 7    Type of Group: Psychoeducation    Wellness Binder Information  Module Name:  Social Wellness  Session Number:  1    Group Goal for Pt: To raise awareness of the importance of active listening skills    Notes:  Pt demonstrated improved awareness of the importance of active listening skills by actively participating in group activities,    Status After Intervention:  Unchanged    Participation Level:  Active Listener and Interactive    Participation Quality: Appropriate and Attentive      Speech:  normal      Thought Process/Content: Logical      Affective Functioning: Congruent      Mood: anxious and depressed      Level of consciousness:  Alert and Oriented x4      Response to Learning: Able to verbalize current knowledge/experience, Able to verbalize/acknowledge new learning, and Progressing to goal      Endings: None Reported    Modes of Intervention: Education      Discipline Responsible: Psychoeducational Specialist      Signature:  Gaby Chacon

## 2021-02-24 NOTE — PROGRESS NOTES
Pt is out on the unit, calm and cooperative. Pt report that she slept fair last night but was somewhat restless. Pt rated her concentration as poor. Pt rated depression \"10\" and anxiety \"6\" and reports that these are the same. Pt denies suicidal thoughts and has had anxiety attacks off and on. Pt is quiet but engages in conversation when prompted and makes good eye contact. No distress noted. Will continue to monitor.

## 2021-02-24 NOTE — PLAN OF CARE
Problem: Falls - Risk of:  Goal: Will remain free from falls  Description: Will remain free from falls  Outcome: Ongoing     Problem: Mood - Altered:  Goal: Mood stable  Description: Mood stable  Outcome: Ongoing     Problem: Altered Mood, Depressive Behavior:  Goal: Able to verbalize and/or display a decrease in depressive symptoms  Description: Able to verbalize and/or display a decrease in depressive symptoms  Outcome: Ongoing     Problem: Altered Mood, Depressive Behavior:  Goal: Absence of self-harm  Description: Absence of self-harm  Outcome: Ongoing     Problem: Pain:  Description: Pain management should include both nonpharmacologic and pharmacologic interventions. Goal: Pain level will decrease  Description: Pain level will decrease  Outcome: Ongoing     Problem: Pain:  Description: Pain management should include both nonpharmacologic and pharmacologic interventions. Goal: Control of acute pain  Description: Control of acute pain  Outcome: Ongoing     Problem: Pain:  Description: Pain management should include both nonpharmacologic and pharmacologic interventions.   Goal: Control of chronic pain  Description: Control of chronic pain  Outcome: Ongoing     Problem: Anxiety:  Goal: Level of anxiety will decrease  Description: Level of anxiety will decrease  Outcome: Ongoing

## 2021-02-24 NOTE — PROGRESS NOTES
Progress Note  Maryellen Dennis Case  2/24/2021 7:19 AM  Subjective:   Admit Date:   2/21/2021      CC/ADMIT DX:       Interval History:   Reviewed overnight events and nursing notes. No new medical complaints. I have reviewed all labs/diagnostics from the last 24hrs. ROS:   I have done a 10 point ROS and all are negative, except what is mentioned in the HPI. DIET LOW FAT; Medications:      traZODone  100 mg Oral Nightly    vitamin D  50,000 Units Oral Weekly    melatonin  5 mg Oral Nightly    FLUoxetine  40 mg Oral Daily    influenza virus vaccine  0.5 mL Intramuscular Prior to discharge           Objective:   Vitals: BP (!) 88/58   Pulse 70   Temp 98.3 °F (36.8 °C) (Temporal)   Resp 16   Ht 5' 5\" (1.651 m)   Wt 124 lb 4 oz (56.4 kg)   LMP  (LMP Unknown)   SpO2 94%   BMI 20.68 kg/m²  No intake or output data in the 24 hours ending 02/24/21 0719  General appearance: alert and cooperative with exam  Extremities: extremities normal, atraumatic, no cyanosis or edema  Neurologic:  No obvious focal neurologic deficits. Skin: no rashes    Assessment and Plan: Active Problems:    Major depressive disorder, recurrent severe without psychotic features ([facility] [address]  Resolved Problems:    * No resolved hospital problems. *    Vit D Def    Plan:  1. Continue present medication(s)   2. Replace Vit D  3. Follow with Psych      Discharge planning:   her home     Reviewed treatment plans with the patient and/or family.              Electronically signed by Gissel Nash MD on 2/24/2021 at 7:19 AM

## 2021-02-25 VITALS
HEART RATE: 72 BPM | SYSTOLIC BLOOD PRESSURE: 109 MMHG | OXYGEN SATURATION: 94 % | HEIGHT: 65 IN | BODY MASS INDEX: 21.68 KG/M2 | RESPIRATION RATE: 16 BRPM | TEMPERATURE: 97.2 F | WEIGHT: 130.13 LBS | DIASTOLIC BLOOD PRESSURE: 74 MMHG

## 2021-02-25 PROCEDURE — 6370000000 HC RX 637 (ALT 250 FOR IP): Performed by: PSYCHIATRY & NEUROLOGY

## 2021-02-25 PROCEDURE — 5130000000 HC BRIDGE APPOINTMENT

## 2021-02-25 PROCEDURE — 99239 HOSP IP/OBS DSCHRG MGMT >30: CPT | Performed by: PSYCHIATRY & NEUROLOGY

## 2021-02-25 RX ORDER — TRAZODONE HYDROCHLORIDE 50 MG/1
50 TABLET ORAL NIGHTLY
Qty: 30 TABLET | Refills: 1 | Status: SHIPPED | OUTPATIENT
Start: 2021-02-25 | End: 2021-03-03 | Stop reason: SDUPTHER

## 2021-02-25 RX ORDER — FLUOXETINE HYDROCHLORIDE 40 MG/1
40 CAPSULE ORAL DAILY
Qty: 30 CAPSULE | Refills: 1 | Status: SHIPPED | OUTPATIENT
Start: 2021-02-26 | End: 2021-03-24 | Stop reason: SDUPTHER

## 2021-02-25 RX ORDER — MECOBALAMIN 5000 MCG
5 TABLET,DISINTEGRATING ORAL NIGHTLY
Qty: 30 TABLET | Refills: 1 | Status: ON HOLD | OUTPATIENT
Start: 2021-02-25 | End: 2021-10-05

## 2021-02-25 RX ORDER — HYDROXYZINE HYDROCHLORIDE 25 MG/1
25 TABLET, FILM COATED ORAL 3 TIMES DAILY PRN
Qty: 60 TABLET | Refills: 1 | Status: SHIPPED | OUTPATIENT
Start: 2021-02-25 | End: 2021-04-26

## 2021-02-25 RX ADMIN — FLUOXETINE HYDROCHLORIDE 40 MG: 20 CAPSULE ORAL at 08:23

## 2021-02-25 RX ADMIN — ACETAMINOPHEN 650 MG: 325 TABLET ORAL at 11:21

## 2021-02-25 ASSESSMENT — PAIN DESCRIPTION - LOCATION: LOCATION: BACK

## 2021-02-25 ASSESSMENT — PAIN DESCRIPTION - ONSET: ONSET: GRADUAL

## 2021-02-25 ASSESSMENT — PAIN DESCRIPTION - ORIENTATION: ORIENTATION: LOWER

## 2021-02-25 ASSESSMENT — PAIN DESCRIPTION - PROGRESSION: CLINICAL_PROGRESSION: NOT CHANGED

## 2021-02-25 NOTE — PROGRESS NOTES
Discharge Note     Pt discharging on this date. Pt denies SI, HI, and AVH at this time. Pt reports improvement in behavior and is leaving unit in overall good condition. SW and pt discussed pt's follow up appointments and importance of attending appointments as scheduled, pt voiced understanding and agreement. Pt and SW also discussed pt safety plan and pt able to verbally identify: warning signs, coping strategies, places and people that help make the pt feel better/distract negative thoughts, friends/family/agencies/professionals the pt can reach out to in a crisis, and something that is important to the pt/worth living for. Pt provided the national suicide prevention hotline number (8-074-787-851-737-9494) as well as local community behavioral health [facility]) crisis number for emergencies (2-958-291-821-281-3101). Pt to follow up with:   [facility] on 3/2/21 at with Wilbert Zimmerman and 3/3 at 1:00 with Dr. Mendoza Sylvester  Referral to out patient tobacco cessation counseling treatment:    Patient refused referral to outpatient tobacco cessation counseling    SW offered to assist pt with transportation, pt reports that she needs assistance with transportation.  BRIDGET got patient a cab

## 2021-02-25 NOTE — DISCHARGE INSTR - DIET

## 2021-02-25 NOTE — PROGRESS NOTES
[facility]  Discharge Note  Bridge Appointment completed: Reviewed Discharge Instructions with patient. Patient verbalizes understanding and agreement with the discharge plan using the teachback method. Referral for Outpatient Tobacco Cessation Counseling, upon discharge (naga X if applicable and completed):    ( )  Hospital staff assisted patient to call Quit Line or faxed referral                                   during hospitalization                  ( )  Recognizing danger situations (included triggers and roadblocks), if not completed on admission                    ( )  Coping skills (new ways to manage stress, exercise, relaxation techniques, changing routine, distraction), if not completed on admission                                                           ( )  Basic information about quitting (benefits of quitting, techniques in how to quit, available resources, if not completed on admission  ( ) Referral for counseling faxed to [facility]   ( ) Patient refused referral  (x ) Patient refused counseling  ( ) Patient refused smoking cessation medication upon discharge    Vaccinations (naga X if applicable and completed):  ( ) Patient states already received influenza vaccine elsewhere  ( ) Patient received influenza vaccine during this hospitalization  (x ) Patient refused influenza vaccine at this time      Pt discharged with followings belongings:   Dentures: None  Vision - Corrective Lenses: None  Hearing Aid: None  Jewelry: None  Body Piercings Removed: N/A  Clothing:  Footwear, Jacket / coat, Pajamas, Sweater  Were All Patient Medications Collected?: Not Applicable  Other Valuables: None Valuables sent home withptient. Valuables retrieved from safe and returned to patient. Patient left department with  Roxie trinidad via  [place], discharged to  home. Patient education on aftercare instructions: yes  Patient verbalize understanding of AVS:  yes. Suicidal Ideations? No AVH? denies HI?  Negative for homicidal ideation       Status EXAM upon discharge:  Status and Exam  Normal: Yes  Facial Expression: Brightened  Affect: Appropriate  Level of Consciousness: Alert  Mood:Normal: No  Mood: Anxious  Motor Activity:Normal: Yes  Motor Activity: Decreased  Interview Behavior: Cooperative  Preception: Ophelia to Person, April Matthew to Time, Ophelia to Place, Ophelia to Situation  Attention:Normal: Yes  Attention: Distractible  Thought Processes: Circumstantial  Thought Content:Normal: Yes  Thought Content: Preoccupations  Hallucinations: None  Delusions: No  Memory:Normal: Yes  Memory: Poor Recent  Insight and Judgment: Yes  Insight and Judgment: Poor Judgment, Poor Insight  Present Suicidal Ideation: No  Present Homicidal Ideation: No

## 2021-02-25 NOTE — PROGRESS NOTES
Group Note    Number of Participants in Group: 8  Number of Patients on Unit:8      Patient attended group:Yes  Reason for Absence:  Intervention for patient absence:        Type of Group:   Wrap-Up/Relaxation    Patient's Goal: See wrap up group sheet    Participation Level:     Active Listener           Patient Response to Learning: Yes    Patient's Behavior: Anxious    Is Patient Social/Interacting: Yes    Relaxation:   Television:Yes   Reading:No   Game/Puzzle:No   Phone: Yes       Notes/Comments:      Please see patient's wrap up group sheet for patient's comments       Electronically signed by Alana Shepherd RN on 2/24/21 at 11:00 PM CST

## 2021-02-25 NOTE — PLAN OF CARE
Group Therapy Note    Date: 2/25/2021  Start Time: 0900  End Time:  0930  Number of Participants: 7    Type of Group: Psychoeducation    Wellness Binder Information  Module Name:  Reducing Relapse  Session Number:  5    Group Goal for Pt: To improve knowledge of strategies to reduce relapse    Notes:  Pt demonstrated improved knowledge of strategies to reduce relapse by actively participating in group activity. Status After Intervention:  Unchanged    Participation Level:  Active Listener and Interactive    Participation Quality: Appropriate and Attentive      Speech:  normal      Thought Process/Content: Logical      Affective Functioning: Congruent      Mood: anxious and depressed      Level of consciousness:  Alert and Oriented x4      Response to Learning: Able to verbalize current knowledge/experience, Able to verbalize/acknowledge new learning, and Progressing to goal      Endings: None Reported    Modes of Intervention: Education      Discipline Responsible: Psychoeducational Specialist      Signature:  Gaby Chacon

## 2021-02-25 NOTE — PROGRESS NOTES
SW met with treatment team to discuss pt's progress and setbacks. SW 2 was present. Pt reportedly slept fair last night, with medications, appetite is improved, attends scheduled group activities, social with peers/staff, performs ADL's, compliant with medications, behavior has been cooperative, reports mild depression/anxiety, denies SI/HI/AVH, possible discharge today, follow-up appointments will be scheduled.
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
## 2021-02-25 NOTE — PROGRESS NOTES
Progress Note  Jasson Persaud Case  2/25/2021 7:59 AM  Subjective:   Admit Date:   2/21/2021      CC/ADMIT DX:       Interval History:   Reviewed overnight events and nursing notes. She has no new medical issues. I have reviewed all labs/diagnostics from the last 24hrs. ROS:   I have done a 10 point ROS and all are negative, except what is mentioned in the HPI. DIET LOW FAT; Medications:      traZODone  50 mg Oral Nightly    vitamin D  50,000 Units Oral Weekly    melatonin  5 mg Oral Nightly    FLUoxetine  40 mg Oral Daily    influenza virus vaccine  0.5 mL Intramuscular Prior to discharge           Objective:   Vitals: BP 96/60   Pulse 94   Temp 97.2 °F (36.2 °C) (Temporal)   Resp 16   Ht 5' 5\" (1.651 m)   Wt 128 lb 8 oz (58.3 kg)   LMP  (LMP Unknown)   SpO2 94%   BMI 21.38 kg/m²  No intake or output data in the 24 hours ending 02/25/21 0759  General appearance: alert and cooperative with exam  Extremities: extremities normal, atraumatic, no cyanosis or edema  Neurologic:  No obvious focal neurologic deficits. Skin: no rashes    Assessment and Plan: Active Problems:    Major depressive disorder, recurrent severe without psychotic features ([facility] [address]  Resolved Problems:    * No resolved hospital problems. *    Vit D Def    Plan:  1. Continue present medication(s)   2. She is medically stable. I will monitor for any changes or concerns. 3.  Follow with Psych      Discharge planning:   her home     Reviewed treatment plans with the patient and/or family.              Electronically signed by Lianna Sanchez MD on 2/25/2021 at 7:59 AM

## 2021-02-25 NOTE — PROGRESS NOTES
Pt with c/o difficylty sleeping.  Administered Saphris 2.5 mg to aide in relief of anxiety and to aide in rest. Will continue to monitor    Electronically signed by Rama Park RN on 2/24/2021 at 10:34 PM

## 2021-02-25 NOTE — PROGRESS NOTES
[facility] Daily Shift Assessment-Geriatric Unit  Nursing Progress Note          Room: [address] [place], [place] [id number]-   Name: Calvin Turcios Case   Age: 60 y.o. Gender: female   Dx: <principal problem not specified>  Precautions: suicide risk and fall risk  Inpatient Status: voluntary     SLEEP:    Sleep: Yes,   Sleep Quality Fair   Hours Slept:    Sleep Medications: Yes  PRN Sleep Meds: Yes       MEDICAL:      Other PRN Meds: Yes   Med Compliant: Yes   Accu-Chek: No   Oxygen/CPAP/BiPAP: No  CIWA/CINA: No   PAIN Assessment: location, back  Side Effects from medication: No    Is Patient experiencing any respiratory symptoms (headache, fever, body aches, cough. Windy Bruce ): no  Patient educated by nursing to practice social distancing, wear masks, wash hands frequently: yes      Metabolic Screening:    Lab Results   Component Value Date    LABA1C 5.5 02/23/2021       Lab Results   Component Value Date    CHOL 229 (H) 02/23/2021     Lab Results   Component Value Date    TRIG 105 02/23/2021     Lab Results   Component Value Date    HDL 43 (L) 02/23/2021     No components found for: LDLCAL  No results found for: LABVLDL      Body mass index is 21.38 kg/m². BP Readings from Last 2 Encounters:   02/24/21 92/65         PSYCH:     SI denies suicidal ideation    HI Negative for homicidal ideation        AVH:Absent      Depression: 3 Anxiety: 3       GENERAL:      Appetite: improved  Social: Yes Speech: normal   Appearance:appropriately dressed  Assistive Devices: noneLevel of Assist: Independent      GROUP:    Group Participation: Yes  Participation LevelActive Listener    Participation QualityAppropriate    Notes:   After patient had her bedtime medications, patient went to lay down. About an hour and a half later patient is back up and reporting that she could not sleep. Saphris 2.5 mg given for anxiety and not being able sleep.          Electronically signed by Dexter Agrawal RN on 2/25/21 at 2:18 AM CST

## 2021-02-25 NOTE — PROGRESS NOTES
CLINICAL PHARMACY NOTE: MEDS  [employer] Select Patient?: No  Total # of Prescriptions Filled: 4   The following medications were delivered to the patient:  · Trazodone 50 mg  · Fluoxetine 40 mg  · Melatonin 5 mg  · Hydroxyzine 25 mg  Total # of Interventions Completed: 0  Time Spent (min): 30    Additional Documentation:    Handed scripts to [place] (Rn)

## 2021-03-02 ENCOUNTER — OFFICE VISIT (OUTPATIENT)
Dept: PSYCHIATRY | Age: 66
End: 2021-03-02
Payer: MEDICAID

## 2021-03-02 DIAGNOSIS — F32.A DEPRESSION, UNSPECIFIED DEPRESSION TYPE: Primary | ICD-10-CM

## 2021-03-02 PROCEDURE — 90837 PSYTX W PT 60 MINUTES: CPT | Performed by: SOCIAL WORKER
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
## 2021-03-02 NOTE — PATIENT INSTRUCTIONS
SAFETY PLAN    A suicide Safety Plan is a document that supports someone when they are having thoughts of suicide. Warning Signs that indicate a suicidal crisis may be developing: What (situations, thoughts, feelings, body sensations, behaviors, etc.) do you experience that lets you know you are beginning to think about suicide? 1. Talking about suicide  2. Every day I was angry that I woke up. 3. Thoughts of disappointment, increased negative thinking    Internal Coping Strategies:  What things can I do (relaxation techniques, hobbies, physical activities, etc.) to take my mind off my problems without contacting another person? 1. Spending time with grandchildren and family  2. Painting  3. Watching TV    People and social settings that provide distraction: Who can I call or where can I go to distract me? 1. Name: Alfredo Vogt Asp  Phone: In phone  2. Name: Marcelino    Phone: In phone   3. Place: Bedroom              People whom I can ask for help: Who can I call when I need help - for example, friends, family, clergy, someone else? 1. Name: Alfredo Vogt Asp  Phone: In phone  2. Name: Marcelino    Phone: In phone  3. Name: Nichole    Phone: In phone     Professionals or [address] [place] [address] I can contact during a crisis: Who can I call for help - for example, my doctor, my psychiatrist, my psychologist, a mental health provider, a suicide hotline? 1. Clinician Name: [address] [place] [address] Outpatient office  Phone: [phone] Option 3      Clinician Pager or Emergency Contact #: [phone]    9. Suicide Prevention Lifeline: 5-939-792-TALK (2239)    3.  [address] [place] [address] Emergency Services -  for example, [address], [facility] suicide hotline, [facility] Hotline:    [address]: Office number: [phone]      Emergency Services Address: [place] [address]      Emergency Services Phone: CRISIS LINE: [phone]    Making the environment safe: How can I make my environment (house/apartment/living space) safer? For example, can I remove guns, medications, and other items? 1. Remove any guns/weapons from home.    2.   Remove or lock up extra medications in a secure location

## 2021-03-03 ENCOUNTER — OFFICE VISIT (OUTPATIENT)
Dept: PSYCHIATRY | Age: 66
End: 2021-03-03
Payer: MEDICAID

## 2021-03-03 VITALS
WEIGHT: 131.5 LBS | BODY MASS INDEX: 21.88 KG/M2 | OXYGEN SATURATION: 94 % | HEART RATE: 77 BPM | TEMPERATURE: 97.9 F | DIASTOLIC BLOOD PRESSURE: 64 MMHG | SYSTOLIC BLOOD PRESSURE: 109 MMHG

## 2021-03-03 DIAGNOSIS — F43.12 CHRONIC POST-TRAUMATIC STRESS DISORDER (PTSD): ICD-10-CM

## 2021-03-03 DIAGNOSIS — F33.1 MAJOR DEPRESSIVE DISORDER, RECURRENT, MODERATE (HCC): Primary | ICD-10-CM

## 2021-03-03 PROCEDURE — 99214 OFFICE O/P EST MOD 30 MIN: CPT | Performed by: PSYCHIATRY & NEUROLOGY

## 2021-03-03 RX ORDER — TRAZODONE HYDROCHLORIDE 100 MG/1
100 TABLET ORAL NIGHTLY
Qty: 30 TABLET | Refills: 2 | Status: SHIPPED | OUTPATIENT
Start: 2021-03-03 | End: 2021-05-27
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
## 2021-03-24 ENCOUNTER — OFFICE VISIT (OUTPATIENT)
Dept: PSYCHIATRY | Age: 66
End: 2021-03-24
Payer: MEDICAID

## 2021-03-24 DIAGNOSIS — F41.9 ANXIETY: ICD-10-CM

## 2021-03-24 DIAGNOSIS — F43.12 CHRONIC POST-TRAUMATIC STRESS DISORDER (PTSD): ICD-10-CM

## 2021-03-24 DIAGNOSIS — F33.1 MAJOR DEPRESSIVE DISORDER, RECURRENT, MODERATE (HCC): Primary | ICD-10-CM

## 2021-03-24 PROCEDURE — 99213 OFFICE O/P EST LOW 20 MIN: CPT | Performed by: PSYCHIATRY & NEUROLOGY

## 2021-03-24 RX ORDER — FLUOXETINE HYDROCHLORIDE 40 MG/1
40 CAPSULE ORAL DAILY
Qty: 30 CAPSULE | Refills: 3 | Status: SHIPPED | OUTPATIENT
Start: 2021-03-24 | End: 2021-09-30

## 2021-03-24 RX ORDER — FLUOXETINE HYDROCHLORIDE 40 MG/1
40 CAPSULE ORAL DAILY
Qty: 30 CAPSULE | Refills: 3 | Status: SHIPPED | OUTPATIENT
Start: 2021-03-24 | End: 2021-03-24 | Stop reason: SDUPTHER

## 2021-03-24 RX ORDER — FLUOXETINE HYDROCHLORIDE 20 MG/1
20 CAPSULE ORAL DAILY
Qty: 30 CAPSULE | Refills: 3 | Status: SHIPPED | OUTPATIENT
Start: 2021-03-24 | End: 2021-10-01 | Stop reason: SDUPTHER

## 2021-03-24 RX ORDER — CLONAZEPAM 0.5 MG/1
0.25 TABLET ORAL DAILY PRN
Qty: 3.5 TABLET | Refills: 0 | Status: SHIPPED | OUTPATIENT
Start: 2021-03-24 | End: 2021-03-24 | Stop reason: ALTCHOICE

## 2021-03-24 RX ORDER — CLONAZEPAM 0.5 MG/1
0.25 TABLET ORAL DAILY PRN
Qty: 4 TABLET | Refills: 0 | Status: SHIPPED | OUTPATIENT
Start: 2021-03-24 | End: 2021-04-15 | Stop reason: ALTCHOICE

## 2021-03-24 NOTE — PROGRESS NOTES
helplessness. States she has been very anxious in the setting of moving. States that she is trying to pack her stuff and feels that she is unable to get things done due to anxiety. Worried that she may not move on time. Requested extension of from her landlord. She is also worried about her son getting out of skilled nursing soon. She has been taking as needed Atarax but it is not very effective. The writer agreed to give her a short-term supply of low-dose Klonopin. Explained that this is a temporary treatment to help her get through a difficult time. Patient voiced understanding. Claims she took Klonopin in the past and did well on it. Patient is requesting increase in Prozac. Her daughter-in-law is helping her move and is at home with her. Current Substance Use: Denies alcohol or illicit drug use. Denies tobacco use.     BP: LMP  (LMP Unknown)       Review of Systems - 14 point review:  Negative    Constitutional: (fevers, chills, night sweats, wt loss/gain, change in appetite, fatigue, somnolence)    HEENT: (ear pain or discharge, hearing loss, ear ringing, sinus pressure, nosebleed, nasal discharge, sore throat, oral sores, tooth pain, bleeding gums, hoarse voice, neck pain)      Cardiovascular: (HTN, chest pain, elevated cholesterol/lipids, palpitations, leg swelling, leg pain with walking)    Respiratory: (cough, wheezing, snoring, SOB with activity (dyspnea), SOB while lying flat (orthopnea), awakening with severe SOB (paroxysmal nocturnal dyspnea))    Gastrointestinal: (NVD, constipation, abdominal pain, bright red stools, black tarry stools, stool incontinence)     Genitourinary:  (pelvic pain, burning or frequency of urination, urinary urgency, blood in urine incomplete bladder emptying, urinary incontinence, STD; MEN: testicular pain or swelling, erectile dysfunction; WOMEN: LMP, heavy menstrual bleeding (menorrhagia), irregular periods, postmenopausal bleeding, menstrual pain (dymenorrhea, vaginal discharge)    Musculoskeletal: (bone pain/fracture, joint pain or swelling, musle pain)    Integumentary: (rashes, acne, non-healing sores, itching, breast lumps, breast pain, nipple discharge, hair loss)    Neurologic: (HA, muscle weakness, paresthesias (numbness, coldness, crawling or prickling), memory loss, seizure, dizziness)    Psychiatric:  (anxiety, sadness, irritability/anger, insomnia, suicidality)    Endocrine: (heat or cold intolerance, excessive thirst (polydipsia), excessive hunger (polyphagia))    Immune/Allergic: (hives, seasonal or environmental allergies, HIV exposure)    Hematologic/Lymphatic: (lymph node enlargement, easy bleeding or bruising)    History obtained via chart review and patient    PCP is No primary care provider on file. Current Meds:    Prior to Admission medications    Medication Sig Start Date End Date Taking? Authorizing Provider   FLUoxetine (PROZAC) 40 MG capsule Take 1 capsule by mouth daily 3/24/21 4/23/21 Yes Ab Boles MD   FLUoxetine (PROZAC) 20 MG capsule Take 1 capsule by mouth daily Take with 40 mg tablet for a total dose of 60 mg daily 3/24/21  Yes Ab Boles MD   clonazePAM (KLONOPIN) 0.5 MG tablet Take 0.5 tablets by mouth daily as needed for Anxiety for up to 7 days. 3/24/21 3/31/21 Yes Ab Boles MD   traZODone (DESYREL) 100 MG tablet Take 1 tablet by mouth nightly 3/3/21 4/2/21  Ab Boles MD   hydrOXYzine (ATARAX) 25 MG tablet Take 1 tablet by mouth 3 times daily as needed for Anxiety 2/25/21 4/26/21  Ab Boles MD   melatonin 5 MG TBDP disintegrating tablet Take 1 tablet by mouth nightly 2/25/21 3/27/21  Ab Boles MD       MSE:  Appearance: UTO  Behavior: Calm, cooperative, and socially appropriate. No psychomotor retardation/agitation appreciated. Speech: Normal in tone, volume, and quality. No slurring, dysarthria or pressured speech noted.    Mood: \"Very anxious\"   Affect: UTO  Thought Process: Appears linear, logical and goal oriented. Causality appears intact. Thought Content: Denies active suicidal and homicidal ideations. No overt delusions or paranoia appreciated. Perceptions: Denies auditory or visual hallucinations at present time. Not responding to internal stimuli. Concentration: Intact. Orientation: to person, place, date, and situation. Language: Intact. Fund of information: Intact. Memory: Recent and remote appear intact. Impulsivity: Limited. Neurovegitative: Fair appetite and sleep. Insight: Fair. Judgment: Fair. Lab Results   Component Value Date     02/23/2021    K 3.5 02/23/2021     02/23/2021    CO2 28 02/23/2021    BUN 13 02/23/2021    CREATININE 0.7 02/23/2021    GLUCOSE 107 02/23/2021    CALCIUM 8.7 (L) 02/23/2021    PROT 6.6 02/21/2021    LABALBU 4.4 02/21/2021    BILITOT 0.3 02/21/2021    ALKPHOS 76 02/21/2021    AST 20 02/21/2021    ALT 16 02/21/2021    LABGLOM >60 02/23/2021    GFRAA >59 02/23/2021     Lab Results   Component Value Date     02/23/2021    K 3.5 02/23/2021    K 3.6 02/21/2021     02/23/2021    CO2 28 02/23/2021    BUN 13 02/23/2021    CREATININE 0.7 02/23/2021    GLUCOSE 107 02/23/2021    CALCIUM 8.7 02/23/2021      Lab Results   Component Value Date    CHOL 229 (H) 02/23/2021     Lab Results   Component Value Date    TRIG 105 02/23/2021     Lab Results   Component Value Date    HDL 43 (L) 02/23/2021     Lab Results   Component Value Date    LDLCALC 165 02/23/2021     No results found for: LABVLDL, VLDL  No results found for: [facility]  Lab Results   Component Value Date    LABA1C 5.5 02/23/2021     No results found for: EAG  Lab Results   Component Value Date    TSHFT4 0.65 02/23/2021     Lab Results   Component Value Date    VITD25 9.9 (L) 02/23/2021       Assessments Administered:      Assessment:   1. Major depressive disorder, recurrent, moderate (HCC)    2. Anxiety    3.  Chronic post-traumatic stress disorder (PTSD) No evidence of acute suicidality, homicidality or psychosis observed. Anxiety related to life circumstances. May benefit from a short course of benzodiazepine. We will check on her tomorrow. Plan:  1. Increase Prozac to help with depression and anxiety. PRN Klonopin to help with anxiety - 8 days supply. The risks, benefits, side effects, indications, contraindications, and adverse effects of the medications have been discussed. Yes.  2. The pt has verbalized understanding and has capacity to give informed consent. 3. The Jennifer Augustin report has been reviewed according to [facility] regulations. 4. Supportive therapy offered. Patient will continue to see her therapist.   5. Follow up: Return in about 4 weeks (around 4/21/2021). 6. The patient has been advised to call with any problems. 7. Controlled substance Treatment Plan: PRN x 1 week, then stop  8. The above listed medications have been continued, modifications in meds and other orders/labs as follows:      Orders Placed This Encounter   Medications    FLUoxetine (PROZAC) 40 MG capsule     Sig: Take 1 capsule by mouth daily     Dispense:  30 capsule     Refill:  3     Take with 20 mg tablet for a total dose of 60 mg daily    FLUoxetine (PROZAC) 20 MG capsule     Sig: Take 1 capsule by mouth daily Take with 40 mg tablet for a total dose of 60 mg daily     Dispense:  30 capsule     Refill:  3    clonazePAM (KLONOPIN) 0.5 MG tablet     Sig: Take 0.5 tablets by mouth daily as needed for Anxiety for up to 7 days. Dispense:  3.5 tablet     Refill:  0      No orders of the defined types were placed in this encounter. 9. Additional comments:  NA        10. Over 50% of the total visit time of  20  minutes was spent on counseling and/or coordination of care of:                        1. Major depressive disorder, recurrent, moderate ([facility] [address]    2. Anxiety    3.  Chronic post-traumatic stress disorder (PTSD)            Gómez Rosenberg MD
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
## 2021-03-25 ENCOUNTER — TELEPHONE (OUTPATIENT)
Dept: PSYCHIATRY | Age: 66
End: 2021-03-25

## 2021-03-25 NOTE — TELEPHONE ENCOUNTER
Called Pt to check on her per [facility]. Pt she she was doing ok. Pt stated the meds have not changed the way she feels. Pt did state she was safe. Pt diego HI, SI, and [facility].       Electronically signed by Osbaldo Little MA on 3/25/2021 at 2:55 PM

## 2021-04-01 ENCOUNTER — TELEPHONE (OUTPATIENT)
Dept: PSYCHIATRY | Age: 66
End: 2021-04-01

## 2021-04-01 RX ORDER — BUSPIRONE HYDROCHLORIDE 5 MG/1
5 TABLET ORAL 3 TIMES DAILY
Qty: 90 TABLET | Refills: 0 | Status: SHIPPED | OUTPATIENT
Start: 2021-04-01 | End: 2021-04-22 | Stop reason: SDUPTHER

## 2021-04-01 NOTE — TELEPHONE ENCOUNTER
Patient called the unit and left a message for the writer. The writer called back today. Patient states she remains very anxious and Klonopin is not working. She has never tried BuSpar and agreed to take it. We will start with 5 mg 3 times daily for a week and will then increase to 10 mg 3 times daily. RX sent to [address] [place] [address] per patient's request.  Patient denies suicidal ideation. States she got extension from [employer] and is moving next month. She no showed for her appointment therapist.  We will have her scheduled again.

## 2021-04-07 ENCOUNTER — TELEPHONE (OUTPATIENT)
Dept: PSYCHIATRY | Age: 66
End: 2021-04-07

## 2021-04-15 ENCOUNTER — VIRTUAL VISIT (OUTPATIENT)
Dept: PSYCHIATRY | Age: 66
End: 2021-04-15
Payer: MEDICAID

## 2021-04-15 DIAGNOSIS — F43.12 CHRONIC POST-TRAUMATIC STRESS DISORDER (PTSD): ICD-10-CM

## 2021-04-15 DIAGNOSIS — F33.1 MAJOR DEPRESSIVE DISORDER, RECURRENT, MODERATE (HCC): Primary | ICD-10-CM

## 2021-04-15 PROCEDURE — 99213 OFFICE O/P EST LOW 20 MIN: CPT | Performed by: PSYCHIATRY & NEUROLOGY

## 2021-04-15 NOTE — PROGRESS NOTES
extension. Her son was released from longterm last week and was taken back to longterm last night. Patient is coping well. Denies issues with sleep and appetite. Asking about her appointment with a therapist.      Current Substance Use: Denies alcohol or illicit drug use. Denies tobacco use.     BP: LMP  (LMP Unknown)       Review of Systems - 14 point review:  Negative    Constitutional: (fevers, chills, night sweats, wt loss/gain, change in appetite, fatigue, somnolence)    HEENT: (ear pain or discharge, hearing loss, ear ringing, sinus pressure, nosebleed, nasal discharge, sore throat, oral sores, tooth pain, bleeding gums, hoarse voice, neck pain)      Cardiovascular: (HTN, chest pain, elevated cholesterol/lipids, palpitations, leg swelling, leg pain with walking)    Respiratory: (cough, wheezing, snoring, SOB with activity (dyspnea), SOB while lying flat (orthopnea), awakening with severe SOB (paroxysmal nocturnal dyspnea))    Gastrointestinal: (NVD, constipation, abdominal pain, bright red stools, black tarry stools, stool incontinence)     Genitourinary:  (pelvic pain, burning or frequency of urination, urinary urgency, blood in urine incomplete bladder emptying, urinary incontinence, STD; MEN: testicular pain or swelling, erectile dysfunction; WOMEN: LMP, heavy menstrual bleeding (menorrhagia), irregular periods, postmenopausal bleeding, menstrual pain (dymenorrhea, vaginal discharge)    Musculoskeletal: (bone pain/fracture, joint pain or swelling, musle pain)    Integumentary: (rashes, acne, non-healing sores, itching, breast lumps, breast pain, nipple discharge, hair loss)    Neurologic: (HA, muscle weakness, paresthesias (numbness, coldness, crawling or prickling), memory loss, seizure, dizziness)    Psychiatric:  (anxiety, sadness, irritability/anger, insomnia, suicidality)    Endocrine: (heat or cold intolerance, excessive thirst (polydipsia), excessive hunger (polyphagia))    Immune/Allergic: (hives, seasonal or environmental allergies, HIV exposure)    Hematologic/Lymphatic: (lymph node enlargement, easy bleeding or bruising)    History obtained via chart review and patient    PCP is No primary care provider on file. Current Meds:    Prior to Admission medications    Medication Sig Start Date End Date Taking? Authorizing Provider   busPIRone (BUSPAR) 5 MG tablet Take 1 tablet by mouth 3 times daily 4/1/21 5/1/21  Randy Segundo MD   FLUoxetine (PROZAC) 40 MG capsule Take 1 capsule by mouth daily 3/24/21 4/23/21  Randy Segundo MD   FLUoxetine (PROZAC) 20 MG capsule Take 1 capsule by mouth daily Take with 40 mg tablet for a total dose of 60 mg daily 3/24/21   Randy Segundo MD   traZODone (DESYREL) 100 MG tablet Take 1 tablet by mouth nightly 3/3/21 4/2/21  Randy Segundo MD   hydrOXYzine (ATARAX) 25 MG tablet Take 1 tablet by mouth 3 times daily as needed for Anxiety 2/25/21 4/26/21  Randy Segundo MD   melatonin 5 MG TBDP disintegrating tablet Take 1 tablet by mouth nightly 2/25/21 3/27/21  Randy Segundo MD       MSE:  Appearance: UTO  Behavior: Calm, cooperative. Speech: Normal in tone, volume, and quality. No slurring, dysarthria or pressured speech noted. Mood: \"Better\"   Affect: UTO  Thought Process: Appears linear, logical and goal oriented. Causality appears intact. Thought Content: Denies active suicidal and homicidal ideations. No overt delusions or paranoia appreciated. Perceptions: Denies auditory or visual hallucinations at present time. Not responding to internal stimuli. Concentration: Intact. Orientation: to person, place, date, and situation. Language: Intact. Fund of information: Intact. Memory: Recent and remote appear intact. Impulsivity: Limited. Neurovegitative: Fair appetite and sleep. Insight: Fair. Judgment: Fair.       Lab Results   Component Value Date     02/23/2021    K 3.5 02/23/2021     02/23/2021    CO2 28 02/23/2021    BUN 13 02/23/2021    CREATININE 0.7 02/23/2021    GLUCOSE 107 02/23/2021    CALCIUM 8.7 (L) 02/23/2021    PROT 6.6 02/21/2021    LABALBU 4.4 02/21/2021    BILITOT 0.3 02/21/2021    ALKPHOS 76 02/21/2021    AST 20 02/21/2021    ALT 16 02/21/2021    LABGLOM >60 02/23/2021    GFRAA >59 02/23/2021     Lab Results   Component Value Date     02/23/2021    K 3.5 02/23/2021    K 3.6 02/21/2021     02/23/2021    CO2 28 02/23/2021    BUN 13 02/23/2021    CREATININE 0.7 02/23/2021    GLUCOSE 107 02/23/2021    CALCIUM 8.7 02/23/2021      Lab Results   Component Value Date    CHOL 229 (H) 02/23/2021     Lab Results   Component Value Date    TRIG 105 02/23/2021     Lab Results   Component Value Date    HDL 43 (L) 02/23/2021     Lab Results   Component Value Date    LDLCALC 165 02/23/2021     No results found for: LABVLDL, VLDL  No results found for: [facility]  Lab Results   Component Value Date    LABA1C 5.5 02/23/2021     No results found for: EAG  Lab Results   Component Value Date    TSHFT4 0.65 02/23/2021     Lab Results   Component Value Date    VITD25 9.9 (L) 02/23/2021       Assessments Administered:      Assessment:   1. Major depressive disorder, recurrent, moderate (HCC)    2. Chronic post-traumatic stress disorder (PTSD)        No evidence of acute suicidality, homicidality or psychosis observed. Doing better. Plan:  1. Continue current regimen. The risks, benefits, side effects, indications, contraindications, and adverse effects of the medications have been discussed. Yes.  2. The pt has verbalized understanding and has capacity to give informed consent. 3. The [facility] report has been reviewed according to [facility] regulations. 4. Supportive therapy offered. Patient will continue to see her therapist.   5. Follow up: Return in about 2 months (around 6/15/2021). 6. The patient has been advised to call with any problems. 7. Controlled substance Treatment Plan: NA  8.  The above listed medications have been continued, modifications in meds and other orders/labs as follows: No orders of the defined types were placed in this encounter. No orders of the defined types were placed in this encounter. 9. Additional comments:  NA        10. Over 50% of the total visit time of  20  minutes was spent on counseling and/or coordination of care of:                        1. Major depressive disorder, recurrent, moderate ([facility] [address]    2.  Chronic post-traumatic stress disorder (PTSD)            Thomas Jamison MD
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
## 2021-04-23 RX ORDER — BUSPIRONE HYDROCHLORIDE 10 MG/1
10 TABLET ORAL 3 TIMES DAILY
Qty: 90 TABLET | Refills: 2 | Status: SHIPPED | OUTPATIENT
Start: 2021-04-23 | End: 2021-05-23

## 2021-05-03 ENCOUNTER — TELEPHONE (OUTPATIENT)
Dept: PSYCHIATRY | Age: 66
End: 2021-05-03
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
## 2021-05-05 ENCOUNTER — TELEPHONE (OUTPATIENT)
Dept: PSYCHIATRY | Age: 66
End: 2021-05-05

## 2021-05-05 NOTE — TELEPHONE ENCOUNTER
Called pt to remind them about their appt tomorrow 5/6. No answer and left Vm.     Electronically signed by Osiris Horowitz MA on 5/5/2021 at 3:11 PM

## 2021-05-06 ENCOUNTER — TELEPHONE (OUTPATIENT)
Dept: PSYCHIATRY | Age: 66
End: 2021-05-06

## 2021-05-06 NOTE — TELEPHONE ENCOUNTER
Pt called to cancel/reschedule her appt today 5/6 with Nadira Voss. She is having her counter tops coming in today and she can't leave while they are there. Reschedule for 6/10.     Electronically signed by Lynn Eden MA on 5/6/2021 at 8:14 AM

## 2021-05-27 RX ORDER — TRAZODONE HYDROCHLORIDE 100 MG/1
TABLET ORAL
Qty: 30 TABLET | Refills: 2 | Status: SHIPPED | OUTPATIENT
Start: 2021-05-27 | End: 2021-06-02
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
## 2021-06-02 ENCOUNTER — VIRTUAL VISIT (OUTPATIENT)
Dept: PSYCHIATRY | Age: 66
End: 2021-06-02
Payer: MEDICAID

## 2021-06-02 ENCOUNTER — TELEPHONE (OUTPATIENT)
Dept: PSYCHIATRY | Age: 66
End: 2021-06-02

## 2021-06-02 DIAGNOSIS — F41.9 ANXIETY: ICD-10-CM

## 2021-06-02 DIAGNOSIS — F43.12 CHRONIC POST-TRAUMATIC STRESS DISORDER (PTSD): ICD-10-CM

## 2021-06-02 DIAGNOSIS — F33.1 MAJOR DEPRESSIVE DISORDER, RECURRENT, MODERATE (HCC): Primary | ICD-10-CM

## 2021-06-02 PROCEDURE — 99442 PR PHYS/QHP TELEPHONE EVALUATION 11-20 MIN: CPT | Performed by: PSYCHIATRY & NEUROLOGY

## 2021-06-02 RX ORDER — BUSPIRONE HYDROCHLORIDE 10 MG/1
10 TABLET ORAL 3 TIMES DAILY
Qty: 90 TABLET | Refills: 2 | Status: SHIPPED | OUTPATIENT
Start: 2021-06-02 | End: 2021-07-02

## 2021-06-02 RX ORDER — TRAZODONE HYDROCHLORIDE 150 MG/1
150 TABLET ORAL NIGHTLY
Qty: 30 TABLET | Refills: 2 | Status: ON HOLD | OUTPATIENT
Start: 2021-06-02 | End: 2021-10-05
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
## 2021-06-02 NOTE — PROGRESS NOTES
6/2/2021 3:26 PM   Progress Note      Jesus George Case is a 60 y.o. female evaluated via telephone on 6/2/2021. Consent:  She and/or health care decision maker is aware that that she may receive a bill for this telephone service, depending on her insurance coverage, and has provided verbal consent to proceed: Yes      Documentation:  I communicated with the patient and/or health care decision maker about - see below  Details of this discussion including any medical advice provided - see below      I affirm this is a Patient Initiated Episode with a Patient who has not had a related appointment within my department in the past 7 days or scheduled within the next 24 hours. Patient identification was verified at the start of the visit: Yes    Total Time: 20 min    The visit was conducted pursuant to the emergency declaration under the [address] [place] [address], [address] [place] [address] authority and the [employer] Act. Patient identification was verified, and a caregiver was present when appropriate. The patient was located in a state where the provider was credentialed to provide care. Note: not billable if this call serves to triage the patient into an appointment for the relevant concern      Jeramy Messer MD       Jesus George Case 1955      Chief Complaint   Patient presents with    Medication Check         Subjective:      71-year-old white female with history of depression, PTSD and anxiety. On Prozac, Trazodone and Atarax. Buspar added for anxiety recently. Patient called to move her appointment for earlier today because she reportedly got arrested. Patient is calm and cooperative on the phone. States she has been very anxious due to her stressors. She is still dealing with insurance. Recently got into an argument with her daughter-in-law who is using drugs and alcohol. Patient reportedly tried some methamphetamine and Valium. Police were called and arrested both of them. Patient recently had court and has another hearing on 18. She denies suicidal ideation. States anxiety is her main problem and she would like something stronger than BuSpar. The writer explained to her that she will not be prescribed controlled substances. Patient agreed to increase BuSpar and come to therapy appointments at our clinic. She is also asking for higher dose of trazodone. Current Substance Use: Denies alcohol use. Denies tobacco use.     BP: LMP  (LMP Unknown)       Review of Systems - 14 point review:  Negative    Constitutional: (fevers, chills, night sweats, wt loss/gain, change in appetite, fatigue, somnolence)    HEENT: (ear pain or discharge, hearing loss, ear ringing, sinus pressure, nosebleed, nasal discharge, sore throat, oral sores, tooth pain, bleeding gums, hoarse voice, neck pain)      Cardiovascular: (HTN, chest pain, elevated cholesterol/lipids, palpitations, leg swelling, leg pain with walking)    Respiratory: (cough, wheezing, snoring, SOB with activity (dyspnea), SOB while lying flat (orthopnea), awakening with severe SOB (paroxysmal nocturnal dyspnea))    Gastrointestinal: (NVD, constipation, abdominal pain, bright red stools, black tarry stools, stool incontinence)     Genitourinary:  (pelvic pain, burning or frequency of urination, urinary urgency, blood in urine incomplete bladder emptying, urinary incontinence, STD; MEN: testicular pain or swelling, erectile dysfunction; WOMEN: LMP, heavy menstrual bleeding (menorrhagia), irregular periods, postmenopausal bleeding, menstrual pain (dymenorrhea, vaginal discharge)    Musculoskeletal: (bone pain/fracture, joint pain or swelling, musle pain)    Integumentary: (rashes, acne, non-healing sores, itching, breast lumps, breast pain, nipple discharge, hair loss)    Neurologic: (HA, muscle weakness, paresthesias (numbness, coldness, crawling or prickling), memory loss, seizure, dizziness)    Psychiatric:  (anxiety, sadness, irritability/anger, insomnia, suicidality)    Endocrine: (heat or cold intolerance, excessive thirst (polydipsia), excessive hunger (polyphagia))    Immune/Allergic: (hives, seasonal or environmental allergies, HIV exposure)    Hematologic/Lymphatic: (lymph node enlargement, easy bleeding or bruising)    History obtained via chart review and patient    PCP is No primary care provider on file. Current Meds:    Prior to Admission medications    Medication Sig Start Date End Date Taking? Authorizing Provider   busPIRone (BUSPAR) 10 MG tablet Take 1 tablet by mouth 3 times daily 6/2/21 7/2/21 Yes Page Cortez MD   traZODone (DESYREL) 150 MG tablet Take 1 tablet by mouth nightly 6/2/21 7/2/21 Yes Page Cortez MD   FLUoxetine (PROZAC) 40 MG capsule Take 1 capsule by mouth daily 3/24/21 4/23/21  Paeg Cortez MD   FLUoxetine (PROZAC) 20 MG capsule Take 1 capsule by mouth daily Take with 40 mg tablet for a total dose of 60 mg daily 3/24/21   Page Cortez MD   melatonin 5 MG TBDP disintegrating tablet Take 1 tablet by mouth nightly 2/25/21 3/27/21  Page Cortez MD       MSE:  Appearance: UTO  Behavior: Calm, cooperative. Speech: Normal in tone, volume, and quality. No slurring, dysarthria or pressured speech noted. Mood: \"Anxious\"   Affect: UTO  Thought Process: Appears linear, logical and goal oriented. Causality appears intact. Thought Content: Denies active suicidal and homicidal ideations. No overt delusions or paranoia appreciated. Perceptions: Denies auditory or visual hallucinations at present time. Not responding to internal stimuli. Concentration: Intact. Orientation: to person, place, date, and situation. Language: Intact. Fund of information: Intact. Memory: Recent and remote appear intact. Impulsivity: Limited. Neurovegitative: Fair appetite and sleep. Insight: Fair. Judgment: Fair.       Lab Results   Component Value Date     02/23/2021    K 3.5 02/23/2021     02/23/2021    CO2 28 02/23/2021    BUN 13 02/23/2021    CREATININE 0.7 02/23/2021    GLUCOSE 107 02/23/2021    CALCIUM 8.7 (L) 02/23/2021    PROT 6.6 02/21/2021    LABALBU 4.4 02/21/2021    BILITOT 0.3 02/21/2021    ALKPHOS 76 02/21/2021    AST 20 02/21/2021    ALT 16 02/21/2021    LABGLOM >60 02/23/2021    GFRAA >59 02/23/2021     Lab Results   Component Value Date     02/23/2021    K 3.5 02/23/2021    K 3.6 02/21/2021     02/23/2021    CO2 28 02/23/2021    BUN 13 02/23/2021    CREATININE 0.7 02/23/2021    GLUCOSE 107 02/23/2021    CALCIUM 8.7 02/23/2021      Lab Results   Component Value Date    CHOL 229 (H) 02/23/2021     Lab Results   Component Value Date    TRIG 105 02/23/2021     Lab Results   Component Value Date    HDL 43 (L) 02/23/2021     Lab Results   Component Value Date    LDLCALC 165 02/23/2021     No results found for: LABVLDL, VLDL  No results found for: [facility]  Lab Results   Component Value Date    LABA1C 5.5 02/23/2021     No results found for: EAG  Lab Results   Component Value Date    TSHFT4 0.65 02/23/2021     Lab Results   Component Value Date    VITD25 9.9 (L) 02/23/2021       Assessments Administered:      Assessment:   1. Major depressive disorder, recurrent, moderate (HCC)    2. Chronic post-traumatic stress disorder (PTSD)    3. Anxiety        No evidence of acute suicidality, homicidality or psychosis observed. Psychiatrically stable. Some anxiety in the setting of social stressors. Will benefit from psychotherapy and increase in BuSpar. Plan:  1. Increase BuSpar to 10 mg 3 times daily for anxiety. Increase trazodone to help with sleep. The risks, benefits, side effects, indications, contraindications, and adverse effects of the medications have been discussed. Yes.  2. The pt has verbalized understanding and has capacity to give informed consent.   3. The Ar Gresham report has been reviewed according to [facility] regulations. 4. Supportive therapy offered. Patient will continue to see her therapist.   5. Follow up: No follow-ups on file. Jul 7 @ 2 pm  6. The patient has been advised to call with any problems. 7. Controlled substance Treatment Plan: NA  8. The above listed medications have been continued, modifications in meds and other orders/labs as follows:      Orders Placed This Encounter   Medications    busPIRone (BUSPAR) 10 MG tablet     Sig: Take 1 tablet by mouth 3 times daily     Dispense:  90 tablet     Refill:  2    traZODone (DESYREL) 150 MG tablet     Sig: Take 1 tablet by mouth nightly     Dispense:  30 tablet     Refill:  2      No orders of the defined types were placed in this encounter. 9. Additional comments:  NA        10. Over 50% of the total visit time of  20  minutes was spent on counseling and/or coordination of care of:                        1. Major depressive disorder, recurrent, moderate ([facility] [address]    2. Chronic post-traumatic stress disorder (PTSD)    3.  Lucille Hook MD

## 2021-06-09 ENCOUNTER — TELEPHONE (OUTPATIENT)
Dept: PSYCHIATRY | Age: 66
End: 2021-06-09

## 2021-06-09 NOTE — TELEPHONE ENCOUNTER
Called pt for appointment reminder.   -Pt confirmed      Electronically signed by Estephanie Arellano MA on 6/9/2021 at 3:36 PM

## 2021-06-10 ENCOUNTER — TELEPHONE (OUTPATIENT)
Dept: PSYCHIATRY | Age: 66
End: 2021-06-10

## 2021-06-10 NOTE — TELEPHONE ENCOUNTER
Called pt was a no show.  Called to check on them and  -Pt reported they will call back to reschedule      Electronically signed by Heaven Luther MA on 6/10/2021 at 4:00 PM

## 2021-07-06 ENCOUNTER — TELEPHONE (OUTPATIENT)
Dept: PSYCHIATRY | Age: 66
End: 2021-07-06

## 2021-07-06 NOTE — TELEPHONE ENCOUNTER
Called pt for appointment reminder.   -left voicemail, requesting a return call      Electronically signed by Viri Moreira MA on 7/6/2021 at 11:47 AM

## 2021-07-07 ENCOUNTER — OFFICE VISIT (OUTPATIENT)
Dept: PSYCHIATRY | Age: 66
End: 2021-07-07
Payer: MEDICAID

## 2021-07-07 VITALS
WEIGHT: 123.7 LBS | DIASTOLIC BLOOD PRESSURE: 65 MMHG | TEMPERATURE: 97.6 F | HEART RATE: 72 BPM | SYSTOLIC BLOOD PRESSURE: 103 MMHG | BODY MASS INDEX: 20.58 KG/M2 | OXYGEN SATURATION: 95 %

## 2021-07-07 DIAGNOSIS — F33.1 MAJOR DEPRESSIVE DISORDER, RECURRENT, MODERATE (HCC): Primary | ICD-10-CM

## 2021-07-07 DIAGNOSIS — F41.9 ANXIETY: ICD-10-CM

## 2021-07-07 DIAGNOSIS — F43.12 CHRONIC POST-TRAUMATIC STRESS DISORDER (PTSD): ICD-10-CM

## 2021-07-07 PROCEDURE — 99214 OFFICE O/P EST MOD 30 MIN: CPT | Performed by: PSYCHIATRY & NEUROLOGY

## 2021-07-07 NOTE — PROGRESS NOTES
incontinence, STD; MEN: testicular pain or swelling, erectile dysfunction; WOMEN: LMP, heavy menstrual bleeding (menorrhagia), irregular periods, postmenopausal bleeding, menstrual pain (dymenorrhea, vaginal discharge)    Musculoskeletal: (bone pain/fracture, joint pain or swelling, musle pain)    Integumentary: (rashes, acne, non-healing sores, itching, breast lumps, breast pain, nipple discharge, hair loss)    Neurologic: (HA, muscle weakness, paresthesias (numbness, coldness, crawling or prickling), memory loss, seizure, dizziness)    Psychiatric:  (anxiety, sadness, irritability/anger, insomnia, suicidality)    Endocrine: (heat or cold intolerance, excessive thirst (polydipsia), excessive hunger (polyphagia))    Immune/Allergic: (hives, seasonal or environmental allergies, HIV exposure)    Hematologic/Lymphatic: (lymph node enlargement, easy bleeding or bruising)    History obtained via chart review and patient    PCP is No primary care provider on file. Current Meds:    Prior to Admission medications    Medication Sig Start Date End Date Taking? Authorizing Provider   traZODone (DESYREL) 150 MG tablet Take 1 tablet by mouth nightly 6/2/21 7/2/21  Abbie Hwang MD   FLUoxetine (PROZAC) 40 MG capsule Take 1 capsule by mouth daily 3/24/21 4/23/21  Abbie Hwang MD   FLUoxetine (PROZAC) 20 MG capsule Take 1 capsule by mouth daily Take with 40 mg tablet for a total dose of 60 mg daily 3/24/21   Abbie Hwang MD   melatonin 5 MG TBDP disintegrating tablet Take 1 tablet by mouth nightly 2/25/21 3/27/21  Abbie Hwang MD       MSE:  Appearance: Stated age, short red hair, wears make up and jewellery  Behavior: Calm, cooperative, smiling  Speech: Normal in tone, volume, and quality. No slurring, dysarthria or pressured speech noted. Mood: \"Ok\"   Affect: Bright  Thought Process: Appears linear, logical and goal oriented. Causality appears intact.    Thought Content: Denies active suicidal and homicidal ideations. No overt delusions or paranoia appreciated. Perceptions: Denies auditory or visual hallucinations at present time. Not responding to internal stimuli. Concentration: Intact. Orientation: to person, place, date, and situation. Language: Intact. Fund of information: Intact. Memory: Recent and remote appear intact. Impulsivity: Limited. Neurovegitative: Fair appetite and sleep. Insight: Fair. Judgment: Fair. Lab Results   Component Value Date     02/23/2021    K 3.5 02/23/2021     02/23/2021    CO2 28 02/23/2021    BUN 13 02/23/2021    CREATININE 0.7 02/23/2021    GLUCOSE 107 02/23/2021    CALCIUM 8.7 (L) 02/23/2021    PROT 6.6 02/21/2021    LABALBU 4.4 02/21/2021    BILITOT 0.3 02/21/2021    ALKPHOS 76 02/21/2021    AST 20 02/21/2021    ALT 16 02/21/2021    LABGLOM >60 02/23/2021    GFRAA >59 02/23/2021     Lab Results   Component Value Date     02/23/2021    K 3.5 02/23/2021    K 3.6 02/21/2021     02/23/2021    CO2 28 02/23/2021    BUN 13 02/23/2021    CREATININE 0.7 02/23/2021    GLUCOSE 107 02/23/2021    CALCIUM 8.7 02/23/2021      Lab Results   Component Value Date    CHOL 229 (H) 02/23/2021     Lab Results   Component Value Date    TRIG 105 02/23/2021     Lab Results   Component Value Date    HDL 43 (L) 02/23/2021     Lab Results   Component Value Date    LDLCALC 165 02/23/2021     No results found for: LABVLDL, VLDL  No results found for: [facility]  Lab Results   Component Value Date    LABA1C 5.5 02/23/2021     No results found for: EAG  Lab Results   Component Value Date    TSHFT4 0.65 02/23/2021     Lab Results   Component Value Date    VITD25 9.9 (L) 02/23/2021       Assessments Administered:      Assessment:   1. Major depressive disorder, recurrent, moderate (HCC)    2. Chronic post-traumatic stress disorder (PTSD)    3. Anxiety        No evidence of acute suicidality, homicidality or psychosis observed.   Psychiatrically stable and doing better overall. Plan:  1. Continue current regimen. The risks, benefits, side effects, indications, contraindications, and adverse effects of the medications have been discussed. Yes.  2. The pt has verbalized understanding and has capacity to give informed consent. 3. The Bertha Jordan report has been reviewed according to [facility] regulations. 4. Supportive therapy offered. Patient will continue to see her therapist.   5. Follow up: Return in about 3 months (around 10/7/2021). 6. The patient has been advised to call with any problems. 7. Controlled substance Treatment Plan: NA  8. The above listed medications have been continued, modifications in meds and other orders/labs as follows: No orders of the defined types were placed in this encounter. No orders of the defined types were placed in this encounter. 9. Additional comments:  Pt will call to arrange therapy appt        10. Over 50% of the total visit time of 38  minutes was spent on counseling and/or coordination of care of:                        1. Major depressive disorder, recurrent, moderate ([facility] [address]    2. Chronic post-traumatic stress disorder (PTSD)    3.  Sudhir Ashford MD

## 2021-08-23 ENCOUNTER — TELEPHONE (OUTPATIENT)
Dept: PSYCHIATRY | Age: 66
End: 2021-08-23

## 2021-08-23 NOTE — TELEPHONE ENCOUNTER
Dr Rayma Cushing not Paige Granados requested that RXs be called in for pt as listed below to pharmacist Jereym Vallecillo at [facility]:    Trazodone 150 mg take one tablet by mouth nightly # 30 no refill (may refill early). Fluoxetine 40 mg take one capsule by mouth daily #30 with no refills. Fluoxetine 20 mg take one capsule by mouth daily with 40 mg cap to equal 60 mg #30 no refills. Per pharmacist, pt already had refills for the Buspar 10 mg po TID that she will get ready for the pt. Pt made aware that RXs were sent to her pharmacy.

## 2021-08-23 NOTE — TELEPHONE ENCOUNTER
Pt called this afternoon requesting to speak with Dr Te Verduzco. Pt reported that she had a lot of things going on and needed recommendations. She said thinks were getting \"hectic with her son and daughter in law\". Pt stated that her son is Schizophrenic and uses meth. She stated he got out of residential on 8/7/21 and now living with her. She said he is off of his meds and his doctor is out on leave. She stated that sometimes she is scared of him and that she is scared that she will have to watch him die due to his illness and possible consequences. She said at times she is afraid someone is going to get hurt. She said he did not have access to fire arms. \"alf is not what he needs. He needs help\". She says he will not seek treatment. Pt stated that she is so nervous that she is vomiting at times. Pt stated she is not leaving the house. Pt stated she had not had Valium or Klonopin in several months. She said she had her last court date yesterday and what she was most worried about is over. Pt says she has had testing and it was determined that she did not have substance issues. Pt stated that she needed refill RXs for Akila Deter stated that refill would be a little early due to her cat knocking the med off into the toilet. Pt stated she also needed RX refills for Buspar, Prozac 20 mg and Prozac 40 mg. Discussed with pt that she had missed several appts with the therapist and encouraged that she see the therapist.  Pt said she would now that her court dates are over. Dr Te Verduzco was given the above info and pt was called back with the recommendations below:  Call the police if/when scared son will hurt someone. Get guardianship so can get him treatment and have ability to make decision for him.   Get Mental Health warrant so he can be evaluated. (pt encouraged to call or go to the [place]  office today to explore options for Mental Health warrant and guardianship)    Pt made aware that her med refills will be sent in and that Dr Hamilton Miranda did not prescribe a benzodiazepine. Pt verbalized understanding of the above info.

## 2021-09-30 RX ORDER — TRAZODONE HYDROCHLORIDE 100 MG/1
TABLET ORAL
Qty: 30 TABLET | Refills: 3 | Status: ON HOLD | OUTPATIENT
Start: 2021-09-30 | End: 2021-10-05

## 2021-09-30 RX ORDER — FLUOXETINE HYDROCHLORIDE 40 MG/1
40 CAPSULE ORAL DAILY
Qty: 30 CAPSULE | Refills: 3 | Status: ON HOLD | OUTPATIENT
Start: 2021-09-30 | End: 2021-10-08 | Stop reason: HOSPADM

## 2021-09-30 NOTE — TELEPHONE ENCOUNTER
Pharmacy sent med refill request below. TRAZODONE RX NEEDED EDITED AS IT LOOKS LIKE PT IS NOW  MG AT HS AND NEEDS A REFILL OF THAT DOSE. Last office visit : 7/7/2021 with Dr Marielos Rosa  Next office visit : 10/13/2021 with Dr Marielos Rosa    Requested Prescriptions     Pending Prescriptions Disp Refills    traZODone ([facility]) 100 MG tablet [Pharmacy Med Name: TRAZODONE 100MG TABLETS] 30 tablet 2     Sig: TAKE 1 TABLET BY MOUTH EVERY NIGHT    FLUoxetine (PROZAC) 40 MG capsule [Pharmacy Med Name: FLUOXETINE 40MG CAPSULES] 30 capsule 3     Sig: TAKE 1 [address], RN        Office Visit  7/7/2021  [address] [facility]   Irineo Mark MD  Psychiatry  Major depressive disorder, recurrent, moderate ([facility] [address] +2 more  Dx  Medication Check  Reason for Visit   Progress Notes  Irineo Mark MD (Physician) [facility]  Expand AllCollapse All             Lorna Giordano Case 1955                               Chief Complaint   Patient presents with    Medication Check            Subjective:       58-year-old white female with history of depression, PTSD and anxiety. On Prozac, Buspar, Trazodone and Atarax. Buspar and Trazodone increased last time.      Patient is calm and cooperative. Looks different today. She recently cut her hair short and colored it red. States she is doing somewhat better. Getting payments from the insurance company. Court hearing related to drug charge scheduled for July 15. Patient believes the charges will be dropped. Having issues with the daughter-in-law who keeps drinking and was just in the hospital. Son will be released from snf in Aug. She will be celebrating her 66th birthday this weekend. Sleeping better. Anxiety controlled. Good appetite. Trying to lose weight.      Current Substance Use: Denies alcohol use. Denies drug use.   Denies tobacco use.     BP: /65   Pulse 72   Temp 97.6 °F (36.4 °C)   Wt 123 lb 11.2 oz (56.1 Meds:     Home Medications           Prior to Admission medications    Medication Sig Start Date End Date Taking? Authorizing Provider   traZODone (DESYREL) 150 MG tablet Take 1 tablet by mouth nightly 6/2/21 7/2/21   Gume Bedolla MD   FLUoxetine (PROZAC) 40 MG capsule Take 1 capsule by mouth daily 3/24/21 4/23/21   Gume Bedolla MD   FLUoxetine (PROZAC) 20 MG capsule Take 1 capsule by mouth daily Take with 40 mg tablet for a total dose of 60 mg daily 3/24/21     Gume Bedolla MD   melatonin 5 MG TBDP disintegrating tablet Take 1 tablet by mouth nightly 2/25/21 3/27/21   Gume Bedolla MD            MSE:  Appearance: Stated age, short red hair, wears make up and jewellery  Behavior: Calm, cooperative, smiling  Speech: Normal in tone, volume, and quality. No slurring, dysarthria or pressured speech noted. Mood: \"Ok\"   Affect: Bright  Thought Process: Appears linear, logical and goal oriented. Causality appears intact. Thought Content: Denies active suicidal and homicidal ideations. No overt delusions or paranoia appreciated. Perceptions: Denies auditory or visual hallucinations at present time. Not responding to internal stimuli. Concentration: Intact. Orientation: to person, place, date, and situation. Language: Intact. Fund of information: Intact. Memory: Recent and remote appear intact. Impulsivity: Limited. Neurovegitative: Fair appetite and sleep. Insight: Fair.    Judgment: Fair.              Lab Results   Component Value Date      02/23/2021     K 3.5 02/23/2021      02/23/2021     CO2 28 02/23/2021     BUN 13 02/23/2021     CREATININE 0.7 02/23/2021     GLUCOSE 107 02/23/2021     CALCIUM 8.7 (L) 02/23/2021     PROT 6.6 02/21/2021     LABALBU 4.4 02/21/2021     BILITOT 0.3 02/21/2021     ALKPHOS 76 02/21/2021     AST 20 02/21/2021     ALT 16 02/21/2021     LABGLOM >60 02/23/2021     GFRAA >59 02/23/2021            Lab Results   Component Value Date      02/23/2021     K 3.5 02/23/2021     K 3.6 02/21/2021      02/23/2021     CO2 28 02/23/2021     BUN 13 02/23/2021     CREATININE 0.7 02/23/2021     GLUCOSE 107 02/23/2021     CALCIUM 8.7 02/23/2021            Lab Results   Component Value Date     CHOL 229 (H) 02/23/2021            Lab Results   Component Value Date     TRIG 105 02/23/2021            Lab Results   Component Value Date     HDL 43 (L) 02/23/2021            Lab Results   Component Value Date     LDLCALC 165 02/23/2021      No results found for: LABVLDL, VLDL  No results found for: [facility]        Lab Results   Component Value Date     LABA1C 5.5 02/23/2021      No results found for: EAG        Lab Results   Component Value Date     TSHFT4 0.65 02/23/2021            Lab Results   Component Value Date     VITD25 9.9 (L) 02/23/2021         Assessments Administered:        Assessment:    1. Major depressive disorder, recurrent, moderate (HCC)    2. Chronic post-traumatic stress disorder (PTSD)    3. Anxiety          No evidence of acute suicidality, homicidality or psychosis observed. Psychiatrically stable and doing better overall.     Plan:  1. Continue current regimen. The risks, benefits, side effects, indications, contraindications, and adverse effects of the medications have been discussed. Yes.  2. The pt has verbalized understanding and has capacity to give informed consent. 3. The Mary Ellen Caballero report has been reviewed according to [facility] regulations. 4. Supportive therapy offered. Patient will continue to see her therapist.   5. Follow up:    Return in about 3 months (around 10/7/2021). 6. The patient has been advised to call with any problems. 7. Controlled substance Treatment Plan: NA  8.  The above listed medications have been continued, modifications in meds and other orders/labs as follows:                   Encounter Medications    No orders of the defined types were placed in this encounter.                  No orders of the defined types were placed in this encounter.        9. Additional comments:  Pt will call to arrange therapy appt           10. Over 50% of the total visit time of 38  minutes was spent on counseling and/or coordination of care of:                         1. Major depressive disorder, recurrent, moderate ([facility] [address]    2. Chronic post-traumatic stress disorder (PTSD)    3.  Anxiety                Mauricia Apley, MD

## 2021-10-01 ENCOUNTER — TELEPHONE (OUTPATIENT)
Dept: PSYCHIATRY | Age: 66
End: 2021-10-01

## 2021-10-01 RX ORDER — FLUOXETINE HYDROCHLORIDE 20 MG/1
20 CAPSULE ORAL DAILY
Qty: 30 CAPSULE | Refills: 3 | Status: ON HOLD | OUTPATIENT
Start: 2021-10-01 | End: 2021-10-05

## 2021-10-01 RX ORDER — BUSPIRONE HYDROCHLORIDE 10 MG/1
10 TABLET ORAL 3 TIMES DAILY
Qty: 90 TABLET | Refills: 3 | Status: ON HOLD | OUTPATIENT
Start: 2021-10-01 | End: 2021-10-05

## 2021-10-01 RX ORDER — BUSPIRONE HYDROCHLORIDE 10 MG/1
10 TABLET ORAL 3 TIMES DAILY
COMMUNITY
End: 2021-10-01 | Stop reason: SDUPTHER

## 2021-10-01 NOTE — TELEPHONE ENCOUNTER
Bayron Griggs R Case called to request a refill on her medication. (Pt decided to get filled at [facility] this time instead of [facility])    Last office visit : 7/7/2021 with Dr Cayla Galvez  Next office visit : 10/13/2021 with Dr Cayla Galvez    Requested Prescriptions     Pending Prescriptions Disp Refills    FLUoxetine (PROZAC) 20 MG capsule 30 capsule 3     Sig: Take 1 capsule by mouth daily Take with 40 mg tablet for a total dose of 60 mg daily    busPIRone (BUSPAR) 10 MG tablet 90 tablet 2     Sig: Take 1 tablet by mouth 3 times daily Per Dr Marsha Bourgeois, RN        Office Visit    7/7/2021  [address] [facility]   Iban Gomez MD  Psychiatry  Major depressive disorder, recurrent, moderate ([facility] [address] +2 more  Dx  Medication Check  Reason for Visit   Progress Notes  Iban Gomez MD (Physician) [facility]  Expand AllCollapse Meek Davila Case 1955                               Chief Complaint   Patient presents with    Medication Check            Subjective:       73-year-old white female with history of depression, PTSD and anxiety. On Prozac, Buspar, Trazodone and Atarax. Buspar and Trazodone increased last time.      Patient is calm and cooperative. Looks different today. She recently cut her hair short and colored it red. States she is doing somewhat better. Getting payments from the insurance company. Court hearing related to drug charge scheduled for July 15. Patient believes the charges will be dropped. Having issues with the daughter-in-law who keeps drinking and was just in the hospital. Son will be released from [facility] in Aug. She will be celebrating her 66th birthday this weekend. Sleeping better. Anxiety controlled. Good appetite. Trying to lose weight.      Current Substance Use: Denies alcohol use. Denies drug use.   Denies tobacco use.     BP: /65   Pulse 72   Temp 97.6 °F (36.4 °C)   Wt 123 lb 11.2 oz (56.1 kg)   LMP  (LMP Unknown) SpO2 95%   BMI 20.58 kg/m²         Review of Systems - 14 point review:  Negative     Constitutional: (fevers, chills, night sweats, wt loss/gain, change in appetite, fatigue, somnolence)     HEENT: (ear pain or discharge, hearing loss, ear ringing, sinus pressure, nosebleed, nasal discharge, sore throat, oral sores, tooth pain, bleeding gums, hoarse voice, neck pain)      Cardiovascular: (HTN, chest pain, elevated cholesterol/lipids, palpitations, leg swelling, leg pain with walking)     Respiratory: (cough, wheezing, snoring, SOB with activity (dyspnea), SOB while lying flat (orthopnea), awakening with severe SOB (paroxysmal nocturnal dyspnea))     Gastrointestinal: (NVD, constipation, abdominal pain, bright red stools, black tarry stools, stool incontinence)     Genitourinary:  (pelvic pain, burning or frequency of urination, urinary urgency, blood in urine incomplete bladder emptying, urinary incontinence, STD; MEN: testicular pain or swelling, erectile dysfunction; WOMEN: LMP, heavy menstrual bleeding (menorrhagia), irregular periods, postmenopausal bleeding, menstrual pain (dymenorrhea, vaginal discharge)     Musculoskeletal: (bone pain/fracture, joint pain or swelling, musle pain)     Integumentary: (rashes, acne, non-healing sores, itching, breast lumps, breast pain, nipple discharge, hair loss)     Neurologic: (HA, muscle weakness, paresthesias (numbness, coldness, crawling or prickling), memory loss, seizure, dizziness)     Psychiatric:  (anxiety, sadness, irritability/anger, insomnia, suicidality)     Endocrine: (heat or cold intolerance, excessive thirst (polydipsia), excessive hunger (polyphagia))     Immune/Allergic: (hives, seasonal or environmental allergies, HIV exposure)     Hematologic/Lymphatic: (lymph node enlargement, easy bleeding or bruising)     History obtained via chart review and patient     PCP is No primary care provider on file.         Current Meds:     Home Medications Prior to Admission medications    Medication Sig Start Date End Date Taking? Authorizing Provider   traZODone (DESYREL) 150 MG tablet Take 1 tablet by mouth nightly 6/2/21 7/2/21   Casey Callahan MD   FLUoxetine (PROZAC) 40 MG capsule Take 1 capsule by mouth daily 3/24/21 4/23/21   Casey Callahan MD   FLUoxetine (PROZAC) 20 MG capsule Take 1 capsule by mouth daily Take with 40 mg tablet for a total dose of 60 mg daily 3/24/21     Casey Callahan MD   melatonin 5 MG TBDP disintegrating tablet Take 1 tablet by mouth nightly 2/25/21 3/27/21   Casey Callahan MD            MSE:  Appearance: Stated age, short red hair, wears make up and jewellery  Behavior: Calm, cooperative, smiling  Speech: Normal in tone, volume, and quality. No slurring, dysarthria or pressured speech noted. Mood: \"Ok\"   Affect: Bright  Thought Process: Appears linear, logical and goal oriented. Causality appears intact. Thought Content: Denies active suicidal and homicidal ideations. No overt delusions or paranoia appreciated. Perceptions: Denies auditory or visual hallucinations at present time. Not responding to internal stimuli. Concentration: Intact. Orientation: to person, place, date, and situation. Language: Intact. Fund of information: Intact. Memory: Recent and remote appear intact. Impulsivity: Limited. Neurovegitative: Fair appetite and sleep. Insight: Fair.    Judgment: Fair.              Lab Results   Component Value Date      02/23/2021     K 3.5 02/23/2021      02/23/2021     CO2 28 02/23/2021     BUN 13 02/23/2021     CREATININE 0.7 02/23/2021     GLUCOSE 107 02/23/2021     CALCIUM 8.7 (L) 02/23/2021     PROT 6.6 02/21/2021     LABALBU 4.4 02/21/2021     BILITOT 0.3 02/21/2021     ALKPHOS 76 02/21/2021     AST 20 02/21/2021     ALT 16 02/21/2021     LABGLOM >60 02/23/2021     GFRAA >59 02/23/2021            Lab Results   Component Value Date      02/23/2021     K 3.5 02/23/2021     K 3.6 02/21/2021      02/23/2021     CO2 28 02/23/2021     BUN 13 02/23/2021     CREATININE 0.7 02/23/2021     GLUCOSE 107 02/23/2021     CALCIUM 8.7 02/23/2021            Lab Results   Component Value Date     CHOL 229 (H) 02/23/2021            Lab Results   Component Value Date     TRIG 105 02/23/2021            Lab Results   Component Value Date     HDL 43 (L) 02/23/2021            Lab Results   Component Value Date     LDLCALC 165 02/23/2021      No results found for: LABVLDL, VLDL  No results found for: [facility]        Lab Results   Component Value Date     LABA1C 5.5 02/23/2021      No results found for: EAG        Lab Results   Component Value Date     TSHFT4 0.65 02/23/2021            Lab Results   Component Value Date     VITD25 9.9 (L) 02/23/2021         Assessments Administered:        Assessment:    1. Major depressive disorder, recurrent, moderate (HCC)    2. Chronic post-traumatic stress disorder (PTSD)    3. Anxiety          No evidence of acute suicidality, homicidality or psychosis observed. Psychiatrically stable and doing better overall.     Plan:  1. Continue current regimen. The risks, benefits, side effects, indications, contraindications, and adverse effects of the medications have been discussed. Yes.  2. The pt has verbalized understanding and has capacity to give informed consent. 3. The [place] Pick[facility] report has been reviewed according to [facility] regulations. 4. Supportive therapy offered. Patient will continue to see her therapist.   5. Follow up:    Return in about 3 months (around 10/7/2021). 6. The patient has been advised to call with any problems. 7. Controlled substance Treatment Plan: NA  8.  The above listed medications have been continued, modifications in meds and other orders/labs as follows:                   Encounter Medications    No orders of the defined types were placed in this encounter.                  No orders of the defined types were placed in this encounter.        9. Additional comments:  Pt will call to arrange therapy appt           10. Over 50% of the total visit time of 38  minutes was spent on counseling and/or coordination of care of:                         1. [facility]    2. Chronic post-traumatic stress disorder (PTSD)    3.  Anxiety                Rojas Jade MD

## 2021-10-05 ENCOUNTER — HOSPITAL ENCOUNTER (INPATIENT)
Age: 66
LOS: 3 days | Discharge: HOME | DRG: 885 | End: 2021-10-08
Attending: PSYCHIATRY & NEUROLOGY | Admitting: PSYCHIATRY & NEUROLOGY
Payer: MEDICARE

## 2021-10-05 DIAGNOSIS — F32.1 CURRENT MODERATE EPISODE OF MAJOR DEPRESSIVE DISORDER, UNSPECIFIED WHETHER RECURRENT (HCC): Primary | ICD-10-CM

## 2021-10-05 DIAGNOSIS — F19.10 POLYSUBSTANCE ABUSE (HCC): ICD-10-CM

## 2021-10-05 LAB
ALBUMIN SERPL-MCNC: 4.1 G/DL (ref 3.5–5.2)
ALP BLD-CCNC: 55 U/L (ref 35–104)
ALT SERPL-CCNC: 16 U/L (ref 5–33)
AMPHETAMINE SCREEN, URINE: POSITIVE
ANION GAP SERPL CALCULATED.3IONS-SCNC: 11 MMOL/L (ref 7–19)
AST SERPL-CCNC: 15 U/L (ref 5–32)
BARBITURATE SCREEN URINE: NEGATIVE
BASOPHILS ABSOLUTE: 0 K/UL (ref 0–0.2)
BASOPHILS RELATIVE PERCENT: 0.6 % (ref 0–1)
BENZODIAZEPINE SCREEN, URINE: NEGATIVE
BILIRUB SERPL-MCNC: <0.2 MG/DL (ref 0.2–1.2)
BILIRUBIN URINE: NEGATIVE
BLOOD, URINE: NEGATIVE
BUN BLDV-MCNC: 10 MG/DL (ref 8–23)
CALCIUM SERPL-MCNC: 8.7 MG/DL (ref 8.8–10.2)
CANNABINOID SCREEN URINE: NEGATIVE
CHLORIDE BLD-SCNC: 104 MMOL/L (ref 98–111)
CLARITY: CLEAR
CO2: 24 MMOL/L (ref 22–29)
COCAINE METABOLITE SCREEN URINE: NEGATIVE
COLOR: YELLOW
CREAT SERPL-MCNC: 0.9 MG/DL (ref 0.5–0.9)
EOSINOPHILS ABSOLUTE: 0 K/UL (ref 0–0.6)
EOSINOPHILS RELATIVE PERCENT: 0.6 % (ref 0–5)
ETHANOL: <10 MG/DL (ref 0–0.08)
GFR AFRICAN AMERICAN: >59
GFR NON-AFRICAN AMERICAN: >60
GLUCOSE BLD-MCNC: 108 MG/DL (ref 74–109)
GLUCOSE URINE: NEGATIVE MG/DL
HCT VFR BLD CALC: 41 % (ref 37–47)
HEMOGLOBIN: 13.2 G/DL (ref 12–16)
IMMATURE GRANULOCYTES #: 0 K/UL
KETONES, URINE: NEGATIVE MG/DL
LEUKOCYTE ESTERASE, URINE: NEGATIVE
LYMPHOCYTES ABSOLUTE: 1.5 K/UL (ref 1.1–4.5)
LYMPHOCYTES RELATIVE PERCENT: 24.4 % (ref 20–40)
Lab: ABNORMAL
MCH RBC QN AUTO: 28.9 PG (ref 27–31)
MCHC RBC AUTO-ENTMCNC: 32.2 G/DL (ref 33–37)
MCV RBC AUTO: 89.9 FL (ref 81–99)
MONOCYTES ABSOLUTE: 0.3 K/UL (ref 0–0.9)
MONOCYTES RELATIVE PERCENT: 5.1 % (ref 0–10)
NEUTROPHILS ABSOLUTE: 4.4 K/UL (ref 1.5–7.5)
NEUTROPHILS RELATIVE PERCENT: 69.1 % (ref 50–65)
NITRITE, URINE: NEGATIVE
OPIATE SCREEN URINE: NEGATIVE
PDW BLD-RTO: 13.2 % (ref 11.5–14.5)
PH UA: 5.5 (ref 5–8)
PLATELET # BLD: 252 K/UL (ref 130–400)
PMV BLD AUTO: 9.6 FL (ref 9.4–12.3)
POTASSIUM SERPL-SCNC: 4.1 MMOL/L (ref 3.5–5)
PROTEIN UA: NEGATIVE MG/DL
RBC # BLD: 4.56 M/UL (ref 4.2–5.4)
SARS-COV-2, NAAT: NOT DETECTED
SODIUM BLD-SCNC: 139 MMOL/L (ref 136–145)
SPECIFIC GRAVITY UA: 1.02 (ref 1–1.03)
TOTAL PROTEIN: 6.1 G/DL (ref 6.6–8.7)
UROBILINOGEN, URINE: 0.2 E.U./DL
WBC # BLD: 6.3 K/UL (ref 4.8–10.8)

## 2021-10-05 PROCEDURE — 85025 COMPLETE CBC W/AUTO DIFF WBC: CPT

## 2021-10-05 PROCEDURE — 6370000000 HC RX 637 (ALT 250 FOR IP): Performed by: PSYCHIATRY & NEUROLOGY

## 2021-10-05 PROCEDURE — 36415 COLL VENOUS BLD VENIPUNCTURE: CPT

## 2021-10-05 PROCEDURE — 82077 ASSAY SPEC XCP UR&BREATH IA: CPT

## 2021-10-05 PROCEDURE — 1240000000 HC EMOTIONAL WELLNESS R&B

## 2021-10-05 PROCEDURE — 80307 DRUG TEST PRSMV CHEM ANLYZR: CPT

## 2021-10-05 PROCEDURE — 81003 URINALYSIS AUTO W/O SCOPE: CPT

## 2021-10-05 PROCEDURE — 87635 SARS-COV-2 COVID-19 AMP PRB: CPT

## 2021-10-05 PROCEDURE — 80053 COMPREHEN METABOLIC PANEL: CPT

## 2021-10-05 PROCEDURE — 99283 EMERGENCY DEPT VISIT LOW MDM: CPT

## 2021-10-05 PROCEDURE — 99223 1ST HOSP IP/OBS HIGH 75: CPT | Performed by: PSYCHIATRY & NEUROLOGY

## 2021-10-05 RX ORDER — FLUOXETINE HYDROCHLORIDE 20 MG/1
80 CAPSULE ORAL DAILY
Status: DISCONTINUED | OUTPATIENT
Start: 2021-10-06 | End: 2021-10-08 | Stop reason: HOSPADM

## 2021-10-05 RX ORDER — MECOBALAMIN 5000 MCG
5 TABLET,DISINTEGRATING ORAL NIGHTLY
Status: DISCONTINUED | OUTPATIENT
Start: 2021-10-05 | End: 2021-10-08 | Stop reason: HOSPADM

## 2021-10-05 RX ORDER — ASENAPINE MALEATE 2.5 MG/1
2.5 TABLET SUBLINGUAL 2 TIMES DAILY PRN
Status: DISCONTINUED | OUTPATIENT
Start: 2021-10-05 | End: 2021-10-08 | Stop reason: HOSPADM

## 2021-10-05 RX ORDER — ACETAMINOPHEN 325 MG/1
650 TABLET ORAL EVERY 4 HOURS PRN
Status: DISCONTINUED | OUTPATIENT
Start: 2021-10-05 | End: 2021-10-08 | Stop reason: HOSPADM

## 2021-10-05 RX ORDER — TRAZODONE HYDROCHLORIDE 50 MG/1
50 TABLET ORAL NIGHTLY PRN
Status: DISCONTINUED | OUTPATIENT
Start: 2021-10-05 | End: 2021-10-05

## 2021-10-05 RX ORDER — POLYETHYLENE GLYCOL 3350 17 G/17G
17 POWDER, FOR SOLUTION ORAL DAILY PRN
Status: DISCONTINUED | OUTPATIENT
Start: 2021-10-05 | End: 2021-10-08 | Stop reason: HOSPADM

## 2021-10-05 RX ORDER — NICOTINE 21 MG/24HR
1 PATCH, TRANSDERMAL 24 HOURS TRANSDERMAL DAILY
Status: DISCONTINUED | OUTPATIENT
Start: 2021-10-05 | End: 2021-10-05

## 2021-10-05 RX ADMIN — ASENAPINE MALEATE 2.5 MG: 2.5 TABLET SUBLINGUAL at 15:34

## 2021-10-05 RX ADMIN — Medication 5 MG: at 22:10

## 2021-10-05 RX ADMIN — TRAZODONE HYDROCHLORIDE 150 MG: 100 TABLET ORAL at 22:10

## 2021-10-05 ASSESSMENT — SLEEP AND FATIGUE QUESTIONNAIRES
DIFFICULTY ARISING: NO
RESTFUL SLEEP: NO
DO YOU HAVE DIFFICULTY SLEEPING: YES
DIFFICULTY STAYING ASLEEP: YES
DIFFICULTY FALLING ASLEEP: YES
AVERAGE NUMBER OF SLEEP HOURS: 5
DO YOU USE A SLEEP AID: YES

## 2021-10-05 ASSESSMENT — ENCOUNTER SYMPTOMS
RHINORRHEA: 0
EYE DISCHARGE: 0
ABDOMINAL DISTENTION: 0
COUGH: 0
APNEA: 0
BACK PAIN: 0
ABDOMINAL PAIN: 0
EYE PAIN: 0
SORE THROAT: 0
SHORTNESS OF BREATH: 0
PHOTOPHOBIA: 0
NAUSEA: 0
COLOR CHANGE: 0

## 2021-10-05 ASSESSMENT — PATIENT HEALTH QUESTIONNAIRE - PHQ9: SUM OF ALL RESPONSES TO PHQ QUESTIONS 1-9: 14

## 2021-10-05 ASSESSMENT — LIFESTYLE VARIABLES: HISTORY_ALCOHOL_USE: NO
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
## 2021-10-05 NOTE — PROGRESS NOTES
I Admission from ED  Nursing Admission Note             Patient Active Problem List   Diagnosis    Major depressive disorder, recurrent severe without psychotic features ([address]         Addictive Behavior:   Addictive Behavior  In the past 3 months, have you felt or has someone told you that you have a problem with:  : None  Do you have a history of Chemical Use?: No  Do you have a history of Alcohol Use?: No  Do you have a history of Street Drug Abuse?: No  Histroy of Prescripton Drug Abuse?: No    Medical Problems:   History reviewed. No pertinent past medical history. Status EXAM:  Status and Exam  Normal: No  Facial Expression: Avoids Gaze, Sad, Worried (tearful)  Affect: Congruent  Level of Consciousness: Alert  Mood:Normal: No  Mood: Depressed, Anxious, Sad, Helpless, Worthless, low self-esteem (rates depression 10, anxiety 8-9)  Motor Activity:Normal: Yes  Interview Behavior: Cooperative  Preception: [place] to Person, [place] Nick to Time, [place] to Place, [place] to Situation  Attention:Normal: No  Attention: Distractible  Thought Processes: Tangential, Circumstantial  Thought Content:Normal: No  Thought Content: Preoccupations  Hallucinations: None  Delusions: No  Memory:Normal: Yes  Insight and Judgment: No  Insight and Judgment:  (limited insight and judgment)  Present Suicidal Ideation: No  Present Homicidal Ideation: No      Metabolic Screening:    Lab Results   Component Value Date    LABA1C 5.5 02/23/2021     Lab Results   Component Value Date    CHOL 229 (H) 02/23/2021     Lab Results   Component Value Date    TRIG 105 02/23/2021     Lab Results   Component Value Date    HDL 43 (L) 02/23/2021     No components found for: LDLCAL  No results found for: LABVLDL    Body mass index is 20.93 kg/m².   BP Readings from Last 2 Encounters:   10/05/21 128/76   07/07/21 103/65       PATIENT STRENGTHS:  Strengths: Communication, No significant Physical Illness, Medication Compliance    Patient Strengths and Limitations:  Limitations: Hopeless about future      Tobacco Screening:  Practical Counseling, on admission, naga X, if applicable and completed (first 3 are required if patient doesn't refuse):            Recognizing danger situations (included triggers and roadblocks)                    Coping skills (new ways to manage stress, exercise, relaxation techniques, changing routine, distraction                                                        Basic information about quitting (benefits of quitting, techniques in how to quit, available resources   Referral for counseling faxed to [facility]                                             Patient refused counseling yes  Patient has not smoked in the last 30 days   Patient is a non-smoker          Admission to Unit:    Pt admitted to [facility] under the care of Robyn Smart, arrived on unit via [facility] with security and staff from ED    Patient arrived dressed in paper scrubs:  Paper scrub bottoms and hospital gown. Pt was given appropriate personal clothing per unit requirements. Body assessment and safety check completed by Monica Lyman RN and Shelby Rayo RN and  no contraband discovered. Patient belongings and valuables was cataloged and accounted for by Shelby Rayo RN. Admission completed by Monica Lyman RN  Oriented to unit, unit policy and expectations:  yes    Reviewed and explained all legal documents:  yes    Education for Fall Prevention and Restraints given: yes    Patient signed all legal documents yes   Pt verbalizes understanding: yes     Jose Lance? yes    Identifies stressors. yes, \"a lot, my son, my daughter-in-law, my house isn't finished\".     COVID TEACHING: Nursing provided education regarding COVID for social distancing, wearing masks, washing hands, and reporting any symptoms: yes  Mask Provided: yes If patient refused, reason: reports broken nose stating mask is uncomfortable      Admission Note:    Pt is a 73year old  female admitted [address] from the ED for severe anxiety and depression. Appropriately groomed. Pt arrived on unit via wheelchair with staff and security. Alert and oriented x 4. Vital signs obtained. Pt was taken to her room, searched with no contraband found, and oriented to unit. Policies and procedures reviewed. Pt appears sad and worried, avoids eye contact, very tearful during conversation. Rates depression 10, anxiety 8-9. Denies SI, HI, and AVH. Will continue to monitor.              Electronically signed by Debora Cowan RN on 10/5/21 at 2:42 PM CDT
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
## 2021-10-05 NOTE — ED NOTES
Pt gowned and monitored. Personal belongs removed and placed at RN station. Suicidal Flowsheet Initiated.         Aminah Dunn RN  10/05/21 8975

## 2021-10-05 NOTE — PROGRESS NOTES
Behavioral Services  Medicare Certification Upon Admission    I certify that this patient's inpatient psychiatric hospital admission is medically necessary for:    [x] (1) Treatment which could reasonably be expected to improve this patient's condition,       [x] (2) Or for diagnostic study;     AND     [x](2) The inpatient psychiatric services are provided while the individual is under the care of a physician and are included in the individualized plan of care.     Estimated length of stay/service 3-7 days    Plan for post-hospital care TBD    Electronically signed by Kvng Castellon MD on 10/5/2021 at 3:47 PM

## 2021-10-06 PROCEDURE — 6370000000 HC RX 637 (ALT 250 FOR IP): Performed by: PSYCHIATRY & NEUROLOGY

## 2021-10-06 PROCEDURE — 1240000000 HC EMOTIONAL WELLNESS R&B

## 2021-10-06 PROCEDURE — 99233 SBSQ HOSP IP/OBS HIGH 50: CPT | Performed by: PSYCHIATRY & NEUROLOGY

## 2021-10-06 RX ADMIN — TRAZODONE HYDROCHLORIDE 150 MG: 100 TABLET ORAL at 20:27

## 2021-10-06 RX ADMIN — ASENAPINE MALEATE 2.5 MG: 2.5 TABLET SUBLINGUAL at 18:24

## 2021-10-06 RX ADMIN — FLUOXETINE HYDROCHLORIDE 80 MG: 20 CAPSULE ORAL at 08:30

## 2021-10-06 RX ADMIN — ASENAPINE MALEATE 2.5 MG: 2.5 TABLET SUBLINGUAL at 08:35

## 2021-10-06 RX ADMIN — Medication 5 MG: at 20:26
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
## 2021-10-06 NOTE — PROGRESS NOTES
Requirement Note     SW met with pt to complete Psychosocial and CSSR-S on this date. Patients long and short term goals discussed. Patient voiced understanding. Treatment plan sheet signed. Patient verbalized understanding of the treatment plan. Patient participated in goals and objectives of the treatment plan. Patient completed safety plan with , patient received copy of plan, and original was placed into patient's chart. SW explained treatment goals with pt. Decreasing depression and anxiety by improvement of positive coping patterns was discussed. Pt acknowledged understanding of treatment goals and signed treatment plan signature sheet. In the last 6 months has the pt been a danger to self: YES  In the last 6 months has the pt been a danger to others: NO  Legal Guardian/POA: NO     Provided patient with [employer] handout entitled \"Quitting Smoking. \"  Reviewed handout with patient: addressing dangers of smoking, developing coping skills, and providing basic information about quitting. Patient received all components practical counseling of tobacco practical counseling during the hospital stay.

## 2021-10-06 NOTE — PROGRESS NOTES
Pt up this morning, calm and cooperative. Pt is pleasant during interaction and soft spoken. Pt makes good eye contact. Pt reports that she slept well last night. Pt reports her concentration is improving. Pt rated her depression \"8\" and anxiety \"9\" and reports that these are the same. Pt denies hallucinations or suicidal thoughts. Pt is working on getting a  and hopes to make more contact with younger son and his family upon discharge. No distress noted. Will continue to monitor.

## 2021-10-06 NOTE — PLAN OF CARE
Problem: Falls - Risk of:  Goal: Will remain free from falls  Description: Will remain free from falls  10/6/2021 0829 by Manjula Stewart RN  Outcome: Ongoing  10/6/2021 0328 by Vincent Beltran RN  Outcome: Met This Shift  Goal: Absence of physical injury  Description: Absence of physical injury  10/6/2021 0829 by Manjula Stewart RN  Outcome: Ongoing  10/6/2021 0328 by Vincent Beltran RN  Outcome: Met This Shift

## 2021-10-06 NOTE — PLAN OF CARE
Problem: Health Behavior:  Goal: Ability to verbalize adaptive coping strategies to use when suicidal feelings occur will improve  Description: Ability to verbalize adaptive coping strategies to use when suicidal feelings occur will improve  10/6/2021 1116 by Joey De Santiago  Outcome: Ongoing  Note:                                                                     Group Therapy Note    Date: 10/6/2021  Start Time: 1000  End Time:  1030  Number of Participants: 3    Type of Group: Psychoeducation    Wellness Binder Information  Module Name:  Relapse Prevention  Session Number:  5    Group Goal for Pt: To improve knowledge of relapse prevention strategies    Notes:  Pt demonstrated improved knowledge of relapse prevention strategies by actively participating in group discussion. Status After Intervention:  Unchanged    Participation Level:  Active Listener    Participation Quality: Appropriate and Attentive      Speech:  normal      Thought Process/Content: Linear      Affective Functioning: Flat      Mood: anxious and depressed      Level of consciousness:  Drowsy      Response to Learning: Able to verbalize current knowledge/experience, Able to verbalize/acknowledge new learning, and Progressing to goal      Endings: None Reported    Modes of Intervention: Education      Discipline Responsible: Psychoeducational Specialist      Signature:  Joey De Santiago

## 2021-10-06 NOTE — PROGRESS NOTES
[facility] Daily Shift Assessment-Geriatric Unit  Nursing Progress Note          Room: [address] [place] [address]   Name: Onel Ly Case   Age: 73 y.o. Gender: female   Dx: <principal problem not specified>  Precautions: suicide risk and fall risk  Inpatient Status: voluntary     SLEEP:    Sleep: Yes,   Sleep Quality Good   Hours Slept: 7   Sleep Medications: Yes  PRN Sleep Meds: No       MEDICAL:      Other PRN Meds: No   Med Compliant: Yes   Accu-Chek: No   Oxygen/CPAP/BiPAP: No  CIWA/CINA: No   PAIN Assessment: none  Side Effects from medication: No    Is Patient experiencing any respiratory symptoms (headache, fever, body aches, cough. Sowmya Oseguera ): no  Patient educated by nursing to practice social distancing, wear masks, wash hands frequently: yes      Metabolic Screening:    Lab Results   Component Value Date    LABA1C 5.5 02/23/2021       Lab Results   Component Value Date    CHOL 229 (H) 02/23/2021     Lab Results   Component Value Date    TRIG 105 02/23/2021     Lab Results   Component Value Date    HDL 43 (L) 02/23/2021     No components found for: LDLCAL  No results found for: LABVLDL      Body mass index is 20.93 kg/m². BP Readings from Last 2 Encounters:   10/05/21 (!) 88/52   07/07/21 103/65         PSYCH:     SI denies suicidal ideation    HI Negative for homicidal ideation        AVH:Absent      Depression: 0 Anxiety: 0       GENERAL:      Appetite: decreased  Social: No Speech: normal   Appearance:appropriately dressed, appropriately groomed and healthy looking  Assistive Devices: noneLevel of Assist: Independent      GROUP:    Group Participation: No  Participation LevelNone    Participation QualityResistant    Notes:   Patient was resting in bed in her room at the beginning of the shift. Patient has not come out of her room at this time. Patient pleasant and cooperative with staff, isolating to room. Patient took medications without problems.   Patient has no complaints of respiratory issues at this time, will monitor for any changes. Patient resting in bed at this time with eyes closed. Will continue to monitor for changes.            Electronically signed by Jane Huerta RN on 10/6/21 at 3:46 AM CDT
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
## 2021-10-06 NOTE — PROGRESS NOTES
Department of Psychiatry  Attending Progress Note - Geriatric      SUBJECTIVE:    73 y.o. female with previous psychiatric history of depression, anxiety disorder, posttraumatic stress disorder, who has been admitted to our psychiatric unit secondary to worsening of the depression and anxiety. Patient has been seen in treatment team room. She reported that her condition mildly improved during this hospital stay and her mood is \"better, more rested\" today. Patient endorses improved appetite and improved quality of sleep, stated that she was able to sleep 8 hours during the last night after adjustment of her psychotropic medications, and she woke up rested well on the morning. Patient is compliant with currently prescribed medications and denies any side effects. She endorses beneficial effect of prescribed medications for her sleep. Patient continues to report mild feeling of anxiety and depression, and rated both of them as 12 out of 10, with 10 being the worst.  However, it seems that the patient is minimizing her current symptoms, as she was not smiling and looked sad and more depressed than she reported. Patient did not attend any group activities in the unit yet. She is not social with other patients in the unit and social with medical staff only when it is related to her treatment plan. She performed her ADLs today. Patient denies current active suicidal or homicidal ideations, denies any plans. Also, she denies any auditory and visual hallucinations. OBJECTIVE    Physical  VITALS:  BP 91/63   Pulse 62   Temp 98.8 °F (37.1 °C) (Temporal)   Resp 16   Ht 5' 5\" (1.651 m)   Wt 126 lb 3 oz (57.2 kg)   LMP  (LMP Unknown)   SpO2 94%   BMI 21.00 kg/m²   TEMPERATURE:  Current - Temp: 98.8 °F (37.1 °C);  Max - Temp  Av.1 °F (36.7 °C)  Min: 97.4 °F (36.3 °C)  Max: 98.8 °F (37.1 °C)  RESPIRATIONS RANGE: Resp  Avg: 15.6  Min: 14  Max: 18  PULSE RANGE: Pulse  Av.9  Min: 62  Max: 68  BLOOD PRESSURE RANGE:  Systolic (48IOE), NWK:375 , Min:88 , RIQ:969   ; Diastolic (56XDD), VNK:33, Min:52, Max:79    PULSE OXIMETRY RANGE: SpO2  Av.1 %  Min: 94 %  Max: 99 %    Review of Systems: 14 point review of systems is negative    Psychological ROS: Positive for mild anxiety and mild depression    Mental Status Examination:   Appearance: Appropriately groomed, wearing casual civilian clothes. Made intermittent eye contact. Behavior: Mildly withdrawn, cooperative with interview, socially appropriate. Mild psychomotor retardation appreciated. Gait unremarkable. Speech: Decreased in tone, normal in volume and quality. Mood: \"Better, more rested\"   Affect: Mood congruent. Range is flat and restricted. Thought Process: Mostly linear and goal oriented  Thought Content: Patient does not have any current active suicidal and homicidal ideations. No overt delusions or paranoia appreciated. Perceptions: Seems patient does not have any auditory or visual hallucinations at present time. Patient did not appear to be responding to internal stimuli. No overt psychosis. Orientation: to person, place, date, and situation. Alert.    Impulsivity: Questionable  Neurovegitative: Improved appetite, improved sleep  Insight: Limited  Judgment: Limited    Data  New lab test results to review    Medications  Current Facility-Administered Medications: influenza quadrivalent split vaccine (FLUZONE;FLUARIX;FLULAVAL;AFLURIA) injection 0.5 mL, 0.5 mL, IntraMUSCular, Prior to discharge  acetaminophen (TYLENOL) tablet 650 mg, 650 mg, Oral, Q4H PRN  polyethylene glycol (GLYCOLAX) packet 17 g, 17 g, Oral, Daily PRN  FLUoxetine (PROZAC) capsule 80 mg, 80 mg, Oral, Daily  melatonin disintegrating tablet 5 mg, 5 mg, Oral, Nightly  traZODone (DESYREL) tablet 150 mg, 150 mg, Oral, Nightly  asenapine maleate (SAPHRIS) sublingual tablet 2.5 mg, 2.5 mg, SubLINGual, BID PRN    ASSESSMENT AND PLAN    DSM 5 DIAGNOSIS:  Major depressive disorder, recurrent, severe, without psychotic features  Posttraumatic stress disorder, chronic  Insomnia    Plan:   1. Psychiatric Medications:   Continue current psychotropic medications as recommended. Patient denies side effect of currently prescribed medications. No changes with dose of prescribed psychotropic medications today. .    2. Medical Issues:    Continue medical monitoring by Dr. Vivian Boss and associates. 3. Disposition:    Discharge patient home when she will be in stable mental condition and adjustment psychotropic medications will be done.      Amount of time spent with patient:    35 minutes with greater than 50% of the time spent in counseling and collaboration of care

## 2021-10-06 NOTE — SUICIDE SAFETY PLAN
SAFETY PLAN    A suicide Safety Plan is a document that supports someone when they are having thoughts of suicide. Warning Signs that indicate a suicidal crisis may be developing: What (situations, thoughts, feelings, body sensations, behaviors, etc.) do you experience that lets you know you are beginning to think about suicide? 1. Pace  2. Withdraw  3. quiet    Internal Coping Strategies:  What things can I do (relaxation techniques, hobbies, physical activities, etc.) to take my mind off my problems without contacting another person? 1. Watch tv  2. Look at family pictures      People and social settings that provide distraction: Who can I call or where can I go to distract me? 1. Name: Shayan Martinez Case - son    2. Place: Visiting his family at his house              People whom I can ask for help: Who can I call when I need help - for example, friends, family, clergy, someone else? 1. Name: Shayan Martinez Case                 2. Name: Lance Tuckers - sister    3. Name: Catherine ponce Case - daughter in law  Professionals or [address] [place] [address] I can contact during a crisis: Who can I call for help - for example, my doctor, my psychiatrist, my psychologist, a mental health provider, a suicide hotline? 1. Clinician Name: [id number]  Phone: [phone]      Clinician Pager or Emergency Contact #: [phone]    2. Clinician Name: [address]   Phone: [phone]      Clinician Pager or Emergency Contact #: [phone]    3. Suicide Prevention Lifeline: 3-637-991-TALK (9960)    4. [facility] Department      Emergency Services Address: [place] [address] [address] [facility]      Emergency Services Phone: [phone]    Making the environment safe: How can I make my environment (house/apartment/living space) safer? For example, can I remove guns, medications, and other items? 1. Remove weapons from the home  2.  Remove extra medications from the home

## 2021-10-06 NOTE — PLAN OF CARE
Problem: Falls - Risk of:  Goal: Will remain free from falls  Description: Will remain free from falls  Outcome: Met This Shift  Goal: Absence of physical injury  Description: Absence of physical injury  Outcome: Met This Shift     Problem: Pain:  Goal: Pain level will decrease  Description: Pain level will decrease  Outcome: Ongoing  Goal: Control of acute pain  Description: Control of acute pain  Outcome: Ongoing  Goal: Control of chronic pain  Description: Control of chronic pain  Outcome: Ongoing     Problem: Health Behavior:  Goal: Ability to verbalize adaptive coping strategies to use when suicidal feelings occur will improve  Description: Ability to verbalize adaptive coping strategies to use when suicidal feelings occur will improve  Outcome: Ongoing  Goal: Ability to verbalize adaptive coping strategies to use when the urge to self-mutilate occurs will improve  Description: Ability to verbalize adaptive coping strategies to use when the urge to self-mutilate occurs will improve  Outcome: Ongoing     Problem: Safety:  Goal: Ability to contract for his/her safety will improve  Description: Ability to contract for his/her safety will improve  Outcome: Ongoing     Problem: Anxiety:  Goal: Level of anxiety will decrease  Description: Level of anxiety will decrease  Outcome: Ongoing

## 2021-10-06 NOTE — PLAN OF CARE
Problem: Health Behavior:  Goal: Ability to verbalize adaptive coping strategies to use when suicidal feelings occur will improve  Description: Ability to verbalize adaptive coping strategies to use when suicidal feelings occur will improve  10/6/2021 1119 by Radha Bonilla  Outcome: Ongoing  Note:                                                                     Group Therapy Note    Date: 10/6/2021  Start Time: 1030  End Time:  1100  Number of Participants: 2    Type of Group: Cognitive Skills    Wellness Binder Information  Module Name:  Men's Issues  Session Number:  1    Group Goal for Pt: To improve knowledge of effective stress management techniques    Notes:  Pt demonstrated improved knowledge of effective stress management techniques by actively participating in group discussion. Status After Intervention:  Unchanged    Participation Level:  Active Listener    Participation Quality: Appropriate and Attentive      Speech:  normal      Thought Process/Content: Linear      Affective Functioning: Flat      Mood: anxious and depressed      Level of consciousness:  Alert and Oriented x4      Response to Learning: Able to verbalize current knowledge/experience, Able to verbalize/acknowledge new learning, and Progressing to goal      Endings: None Reported    Modes of Intervention: Education      Discipline Responsible: Psychoeducational Specialist      Signature:  Radha Bonilla

## 2021-10-07 LAB
CHOLESTEROL, TOTAL: 228 MG/DL (ref 160–199)
HBA1C MFR BLD: 5.7 % (ref 4–6)
HDLC SERPL-MCNC: 53 MG/DL (ref 65–121)
LDL CHOLESTEROL CALCULATED: 142 MG/DL
TRIGL SERPL-MCNC: 167 MG/DL (ref 0–149)
VITAMIN D 25-HYDROXY: 28.9 NG/ML

## 2021-10-07 PROCEDURE — 1240000000 HC EMOTIONAL WELLNESS R&B

## 2021-10-07 PROCEDURE — 36415 COLL VENOUS BLD VENIPUNCTURE: CPT

## 2021-10-07 PROCEDURE — 99233 SBSQ HOSP IP/OBS HIGH 50: CPT | Performed by: PSYCHIATRY & NEUROLOGY

## 2021-10-07 PROCEDURE — 83036 HEMOGLOBIN GLYCOSYLATED A1C: CPT

## 2021-10-07 PROCEDURE — 80061 LIPID PANEL: CPT

## 2021-10-07 PROCEDURE — 82306 VITAMIN D 25 HYDROXY: CPT

## 2021-10-07 PROCEDURE — 6370000000 HC RX 637 (ALT 250 FOR IP): Performed by: PSYCHIATRY & NEUROLOGY

## 2021-10-07 PROCEDURE — 6370000000 HC RX 637 (ALT 250 FOR IP): Performed by: FAMILY MEDICINE

## 2021-10-07 RX ORDER — DOXEPIN HYDROCHLORIDE 50 MG/1
50 CAPSULE ORAL NIGHTLY
Status: DISCONTINUED | OUTPATIENT
Start: 2021-10-07 | End: 2021-10-08 | Stop reason: HOSPADM

## 2021-10-07 RX ORDER — ERGOCALCIFEROL 1.25 MG/1
50000 CAPSULE ORAL WEEKLY
Status: DISCONTINUED | OUTPATIENT
Start: 2021-10-07 | End: 2021-10-08 | Stop reason: HOSPADM

## 2021-10-07 RX ADMIN — DOXEPIN HYDROCHLORIDE 50 MG: 50 CAPSULE ORAL at 20:46

## 2021-10-07 RX ADMIN — Medication 5 MG: at 20:46

## 2021-10-07 RX ADMIN — ASENAPINE MALEATE 2.5 MG: 2.5 TABLET SUBLINGUAL at 08:38

## 2021-10-07 RX ADMIN — ERGOCALCIFEROL 50000 UNITS: 1.25 CAPSULE ORAL at 10:33

## 2021-10-07 RX ADMIN — ASENAPINE MALEATE 2.5 MG: 2.5 TABLET SUBLINGUAL at 21:45

## 2021-10-07 RX ADMIN — FLUOXETINE HYDROCHLORIDE 80 MG: 20 CAPSULE ORAL at 08:00

## 2021-10-07 NOTE — PROGRESS NOTES
SW met with treatment team to discuss pt's progress and setbacks. SW 2 was present. Pt reportedly slept well last night, with medications, appetite is good, attends scheduled group activities, isolative to self, independent with ADLS, compliant with medications, behavior has been cooperative, reports severe depression/anxiety, denies SI/HI/AVH, continue current treatment plan.

## 2021-10-07 NOTE — PLAN OF CARE
Problem: Health Behavior:  Goal: Ability to verbalize adaptive coping strategies to use when suicidal feelings occur will improve  Description: Ability to verbalize adaptive coping strategies to use when suicidal feelings occur will improve  10/7/2021 1244 by Radha Bonilla  Outcome: Ongoing  Note:                                                                     Group Therapy Note    Date: 10/7/2021  Start Time: 1030  End Time:  1100  Number of Participants: 2    Type of Group: Cognitive Skills    Wellness Binder Information  Module Name:  Women's Issues  Session Number:  4    Group Goal for Pt: To raise awareness of how thoughts influence feelings    Notes:  Pt demonstrated improved awareness of how thoughts influence feelings by actively participating in group discussion. Status After Intervention:  Unchanged    Participation Level:  Active Listener and Interactive    Participation Quality: Appropriate and Attentive      Speech:  normal      Thought Process/Content: Logical      Affective Functioning: Congruent      Mood: anxious and depressed      Level of consciousness:  Alert and Oriented x4      Response to Learning: Able to verbalize current knowledge/experience, Able to verbalize/acknowledge new learning, and Progressing to goal      Endings: None Reported    Modes of Intervention: Education      Discipline Responsible: Psychoeducational Specialist      Signature:  Radha Bonilla

## 2021-10-07 NOTE — PLAN OF CARE
Problem: Health Behavior:  Goal: Ability to verbalize adaptive coping strategies to use when suicidal feelings occur will improve  Description: Ability to verbalize adaptive coping strategies to use when suicidal feelings occur will improve  10/7/2021 1242 by Renny Jade  Outcome: Ongoing  Note:                                                                     Group Therapy Note    Date: 10/7/2021  Start Time: 1000  End Time:  1030  Number of Participants: 2    Type of Group: Psychoeducation    Wellness Binder Information  Module Name:  Women's Issues  Session Number:  2    Group Goal for Pt: To raise awareness of the importance of a healthy self-esteem    Notes:  Pt demonstrated improved awareness of the importance of a healthy self-esteem by actively participating in group discussion. Status After Intervention:  Unchanged    Participation Level:  Active Listener and Interactive    Participation Quality: Appropriate and Attentive      Speech:  normal      Thought Process/Content: Logical      Affective Functioning: Congruent      Mood: anxious and depressed      Level of consciousness:  Alert and Oriented x4      Response to Learning: Able to verbalize current knowledge/experience, Able to verbalize/acknowledge new learning, and Progressing to goal      Endings: None Reported    Modes of Intervention: Education      Discipline Responsible: Psychoeducational Specialist      Signature:  Renny Jade

## 2021-10-07 NOTE — PROGRESS NOTES
[facility] Daily Shift Assessment-Geriatric Unit  Nursing Progress Note          Room: [facility]617-01   Name: Eladia Howe Case   Age: 73 y.o. Gender: female   Dx: <principal problem not specified>  Precautions: suicide risk and fall risk  Inpatient Status: voluntary     SLEEP:    Sleep: Yes,   Sleep Quality Good   Hours Slept: 9   Sleep Medications: Yes trazodone 150 mg melatonin 5mg  PRN Sleep Meds: No       MEDICAL:      Other PRN Meds: Yes  Saphris 2.5 mg   Med Compliant: Yes   Accu-Chek: No   Oxygen/CPAP/BiPAP: No  CIWA/CINA: No   PAIN Assessment: none  Side Effects from medication: No    Is Patient experiencing any respiratory symptoms (headache, fever, body aches, cough. Velia Crape ): no  Patient educated by nursing to practice social distancing, wear masks, wash hands frequently: yes      Metabolic Screening:    Lab Results   Component Value Date    LABA1C 5.5 02/23/2021       Lab Results   Component Value Date    CHOL 229 (H) 02/23/2021     Lab Results   Component Value Date    TRIG 105 02/23/2021     Lab Results   Component Value Date    HDL 43 (L) 02/23/2021     No components found for: LDLCAL  No results found for: LABVLDL      Body mass index is 21 kg/m². BP Readings from Last 2 Encounters:   10/06/21 96/66   07/07/21 103/65         PSYCH:     SI denies suicidal ideation    HI Negative for homicidal ideation        AVH:Absent      Depression: 9 Anxiety: 9       GENERAL:      Appetite: no change from normal    Social: No   Speech: hesitant   Appearance:appropriately dressed, appropriately groomed, good hygiene and healthy looking  Assistive Devices: none  Level of Assist: Independent      GROUP:    Group Participation: Yes  Participation LevelActive Listener    Participation QualityAppropriate    Notes: Pt in the TV area at the beginning of this shift. Pt is flat ,expressionless during this interview. Pt is cooperative but many questions have to be repeated and she answers bluntly.  The patient reports she does not feel that she has any emotional support outside outside of therapy. Pt is medication compliant ,well groomed ,and says she feels better. Will continue to monitor for safety.
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
## 2021-10-07 NOTE — BH NOTE
At 2100 Geraldine Case, sister to patient called and was upset and wanted to know if patient was going to be discharged. Pt needs to call into Probation every day per her sister and her probation color is \"yellow\" and patient is on Diversion and is suppose to call into Probation each day. Sister stated that she needs to know is she is going to be discharged tomorrow, because she has to be in the  office for patient's one to pay  5,000 dollar and she wants to know what time she was suppose to pick her up. Sister also stated that she is helping patient take care of son's legal issues knowing that he is in half-way at this time and she is assisting to take care of there six dogs. Sister states that patient and her live together and that she is busy taking care of patients business and she is feeling overwhelmed.      Sister stated that she called to remind patient that she needs to call into Probation regarding her Diversion and her probation color is \"Yellow\"

## 2021-10-07 NOTE — PROGRESS NOTES
WRAP UP GROUP NOTE:     Patient's Goal:  Providing feedback as to their own progress in the care-plan provided. Pt's have an opportunity to explore self-reflective skills and share any additional cares and concerns not yet addressed. Pt effectively participated.      Energy level:LOW  Appetite:NORMAL  Concentration: NORMAL, POOR  Hallucinations:GONE  Depression:SAME  Anxiety:ON AND OFF  How I worked today:BLANK  What helps me sleep:LOOK AT MAGAZINES  Any questions/complaints/comments:NO    LIFE SKILLS,  SEEING DOCTOR  JOURNALING

## 2021-10-07 NOTE — PLAN OF CARE
Problem: Falls - Risk of:  Goal: Will remain free from falls  Description: Will remain free from falls  10/7/2021 0824 by Nia Mclaughlin RN  Outcome: Ongoing  10/6/2021 2201 by Maria Dolores Goldstein RN  Outcome: Met This Shift  Goal: Absence of physical injury  Description: Absence of physical injury  10/7/2021 0824 by Nia Mclaughlin RN  Outcome: Ongoing  10/6/2021 2201 by Maria Dolores Goldstein RN  Outcome: Met This Shift

## 2021-10-07 NOTE — H&P
HISTORY and PHYSICAL      CHIEF COMPLAINT:  Depression, Anxiety    Reason for Admission:  Depression, Anxiety    History Obtained From:  Patient, chart    HISTORY OF PRESENT ILLNESS:      The patient is a 73 y.o. female who is admitted to the [place] [address] unit with worsening mood issues. She has no c/o CP or SOA. No abdominal pain or N/V. No dysuria. No new pain issues. No fevers. Past Medical History:    History reviewed. No pertinent past medical history. Past Surgical History:    History reviewed. No pertinent surgical history. Medications Prior to Admission:    Medications Prior to Admission: FLUoxetine (PROZAC) 40 MG capsule, TAKE 1 CAPSULE BY MOUTH DAILY  [DISCONTINUED] FLUoxetine (PROZAC) 20 MG capsule, Take 1 capsule by mouth daily Take with 40 mg tablet for a total dose of 60 mg daily  [DISCONTINUED] busPIRone (BUSPAR) 10 MG tablet, Take 1 tablet by mouth 3 times daily Per Dr Astrid Roberts  [DISCONTINUED] traZODone (DESYREL) 100 MG tablet, TAKE 1 TABLET BY MOUTH EVERY NIGHT  [DISCONTINUED] traZODone (DESYREL) 150 MG tablet, Take 1 tablet by mouth nightly (Patient taking differently: Take 150 mg by mouth nightly 8/23/21 per direction of Dr Astrid Roberts, #30 no refill called in.)  [DISCONTINUED] melatonin 5 MG TBDP disintegrating tablet, Take 1 tablet by mouth nightly    Allergies:  Patient has no known allergies. Social History:   TOBACCO:   has an unknown smoking status. She has never used smokeless tobacco.  ETOH:   reports previous alcohol use. DRUGS:   reports previous drug use. MARITAL STATUS:    OCCUPATION:  Not working  Patient currently lives with family       Family History:   History reviewed. No pertinent family history.   REVIEW OF SYSTEMS:  Constitutional: neg  CV: neg  Pulmonary: neg  GI: neg  : neg  Psych: depression, anxiety  Neuro: neg  Skin: neg  MusculoSkeletal: neg  HEENT: neg  Joints: neg    Vitals:  BP 96/66   Pulse 64 Temp 97.3 °F (36.3 °C)   Resp 16   Ht 5' 5\" (1.651 m)   Wt 126 lb 3 oz (57.2 kg)   LMP  (LMP Unknown)   SpO2 96%   BMI 21.00 kg/m²     PHYSICAL EXAM:  Gen: NAD, alert, in bed  HEENT: WNL  Lymph: no LAD  Neck: no JVD or masses  Chest: CTA bilat  CV: RRR  Abdomen: NT/ND  Extrem: no C/C/E  Neuro: non focal  Skin: no rashes  Joints: no redness    DATA:  I have reviewed the admission labs and imaging tests.     ASSESSMENT AND PLAN:      Active Problems:    Major depressive disorder, recurrent severe without psychotic features---follow with Soheila Mireles MD  11:15 PM 10/6/2021

## 2021-10-07 NOTE — PLAN OF CARE
Problem: Falls - Risk of:  Goal: Will remain free from falls  Description: Will remain free from falls  10/6/2021 2201 by Woodrow Payne RN  Outcome: Met This Shift  10/6/2021 0829 by Javy Baptiste RN  Outcome: Ongoing  Goal: Absence of physical injury  Description: Absence of physical injury  10/6/2021 2201 by Woodrow Payne RN  Outcome: Met This Shift  10/6/2021 0829 by Javy Baptiste RN  Outcome: Ongoing     Problem: Pain:  Goal: Pain level will decrease  Description: Pain level will decrease  10/6/2021 2201 by Woodrow Payne RN  Outcome: Met This Shift  10/6/2021 0829 by Javy Baptiste RN  Outcome: Ongoing  Goal: Control of acute pain  Description: Control of acute pain  10/6/2021 2201 by Woodrow Payne RN  Outcome: Met This Shift  10/6/2021 0829 by Javy Baptiste RN  Outcome: Ongoing  Goal: Control of chronic pain  Description: Control of chronic pain  10/6/2021 2201 by Woodrow Payne RN  Outcome: Met This Shift  10/6/2021 0829 by Javy Baptiste RN  Outcome: Ongoing     Problem: Health Behavior:  Goal: Ability to verbalize adaptive coping strategies to use when suicidal feelings occur will improve  Description: Ability to verbalize adaptive coping strategies to use when suicidal feelings occur will improve  10/6/2021 2201 by Woodrow Payne RN  Outcome: Ongoing  10/6/2021 1119 by Rudy Oppenheim  Outcome: Ongoing  Note:                                                                     Group Therapy Note    Date: 10/6/2021  Start Time: 1030  End Time:  1100  Number of Participants: 3    Type of Group: Cognitive Skills    Wellness Binder Information  Module Name:  Men's Issues  Session Number:  1    Group Goal for Pt: To improve knowledge of effective stress management techniques    Notes:  Pt demonstrated improved knowledge of effective stress management techniques by actively participating in group discussion. Status After Intervention:  Unchanged    Participation Level:  Active Listener    Participation Quality: Appropriate and Attentive      Speech:  normal      Thought Process/Content: Linear      Affective Functioning: Flat      Mood: anxious and depressed      Level of consciousness:  Alert and Oriented x4      Response to Learning: Able to verbalize current knowledge/experience, Able to verbalize/acknowledge new learning, and Progressing to goal      Endings: None Reported    Modes of Intervention: Education      Discipline Responsible: Psychoeducational Specialist      Signature:  Yesenia Turcios    10/6/2021 1116 by Yesenia Turcios  Outcome: Ongoing  Note:                                                                     Group Therapy Note    Date: 10/6/2021  Start Time: 1000  End Time:  1030  Number of Participants: 3    Type of Group: Psychoeducation    Wellness Binder Information  Module Name:  Relapse Prevention  Session Number:  5    Group Goal for Pt: To improve knowledge of relapse prevention strategies    Notes:  Pt demonstrated improved knowledge of relapse prevention strategies by actively participating in group discussion. Status After Intervention:  Unchanged    Participation Level:  Active Listener    Participation Quality: Appropriate and Attentive      Speech:  normal      Thought Process/Content: Linear      Affective Functioning: Flat      Mood: anxious and depressed      Level of consciousness:  Drowsy      Response to Learning: Able to verbalize current knowledge/experience, Able to verbalize/acknowledge new learning, and Progressing to goal      Endings: None Reported    Modes of Intervention: Education      Discipline Responsible: Psychoeducational Specialist      Signature:  Yesenia Turcios    10/6/2021 4575 by Che Parekh RN  Outcome: Ongoing  Goal: Ability to verbalize adaptive coping strategies to use when the urge to self-mutilate occurs will improve  Description: Ability to verbalize adaptive coping strategies to use when the urge to self-mutilate occurs will

## 2021-10-07 NOTE — PROGRESS NOTES
Pt up this morning, calm and cooperative. Pt received message from family member that she is to contact . Pt reports that she slept well last night. Pt reports that her concentration is improving. Pt rated depression 8-9 and stated that this was the same, she rated anxiety \"8\" and reports that this is improving. Pt denies suicidal thoughts or homicidal thoughts. Pt denies hallucinations. Pt is working on plans to go home and is going to start a journal when she is discharged. No distress noted. Will continue to monitor.

## 2021-10-07 NOTE — PROGRESS NOTES
Department of Psychiatry  Attending Progress Note - Geriatric      SUBJECTIVE:    73 y. o. female with previous psychiatric history of depression, anxiety disorder, posttraumatic stress disorder, who has been admitted to our psychiatric unit secondary to worsening of the depression and anxiety. Patient has been seen in treatment team room with presence of the patient's nurse. Patient reported that her condition continues to improve and her mood is \"better\" today. She endorses good appetite and good quality of sleep with prescribed psychotropic medications, stated that she did not experience any difficulties to fall asleep or stay asleep during the last a few nights. She is compliant with currently prescribed medications and denies any side effects. Patient continues to endorse feeling of depression and anxiety, and rated both of them as 7 out of 10, with 10 being the worst.  Patient stated that she feels depressed and anxious due to making the renovation in her house, as well as necessity to perform community services for possession of the drugs. Patient stated that she is currently on probation. Patient expressed concern that due to his general physical condition and her age, she would be not able to perform some physical job during the probation, if it would be necessary. Patient attends group activities in the unit and participates in those activities. She is not social with other patients in the unit and social with medical staff when its related for her treatment plan  She denies current active suicidal or homicidal ideations, denies any plans. Also, patient denies any auditory and visual hallucinations. OBJECTIVE    Physical  VITALS:  /71   Pulse 71   Temp 96.8 °F (36 °C) (Temporal)   Resp 16   Ht 5' 5\" (1.651 m)   Wt 126 lb 3 oz (57.2 kg)   LMP  (LMP Unknown)   SpO2 95%   BMI 21.00 kg/m²   TEMPERATURE:  Current - Temp: 96.8 °F (36 °C);  Max - Temp  Av.1 °F (36.2 °C)  Min: (SAPHRIS) sublingual tablet 2.5 mg, 2.5 mg, SubLINGual, BID PRN    ASSESSMENT AND PLAN    DSM 5 DIAGNOSIS:  Major depressive disorder, recurrent, severe, without psychotic features  Posttraumatic stress disorder, chronic  Insomnia     Plan:   1. Psychiatric Medications:   Continue current psychotropic medications as recommended.     Patient denies side effect of currently prescribed medications. Will discontinue trazodone due to possible false positive results for methamphetamine, considering the fact that patient is on probation for using methamphetamine in the past, and will start patient on doxepin 50 mg at bedtime for insomnia. 2. Medical Issues:    Continue medical monitoring by Dr. Radha Mcleod and associates.       3.  Disposition:    Discharge patient home when she will be in stable mental condition and adjustment psychotropic medications will be done.     Amount of time spent with patient:    35 minutes with greater than 50% of the time spent in counseling and collaboration of care

## 2021-10-08 VITALS
WEIGHT: 126.19 LBS | DIASTOLIC BLOOD PRESSURE: 67 MMHG | OXYGEN SATURATION: 97 % | HEART RATE: 73 BPM | BODY MASS INDEX: 21.02 KG/M2 | TEMPERATURE: 98 F | RESPIRATION RATE: 16 BRPM | SYSTOLIC BLOOD PRESSURE: 99 MMHG | HEIGHT: 65 IN

## 2021-10-08 PROCEDURE — 6370000000 HC RX 637 (ALT 250 FOR IP): Performed by: PSYCHIATRY & NEUROLOGY

## 2021-10-08 PROCEDURE — 90686 IIV4 VACC NO PRSV 0.5 ML IM: CPT | Performed by: PSYCHIATRY & NEUROLOGY

## 2021-10-08 PROCEDURE — 6360000002 HC RX W HCPCS: Performed by: PSYCHIATRY & NEUROLOGY

## 2021-10-08 PROCEDURE — 99239 HOSP IP/OBS DSCHRG MGMT >30: CPT | Performed by: PSYCHIATRY & NEUROLOGY

## 2021-10-08 PROCEDURE — G0008 ADMIN INFLUENZA VIRUS VAC: HCPCS | Performed by: PSYCHIATRY & NEUROLOGY

## 2021-10-08 PROCEDURE — 5130000000 HC BRIDGE APPOINTMENT

## 2021-10-08 RX ORDER — ASENAPINE MALEATE 2.5 MG/1
2.5 TABLET SUBLINGUAL 2 TIMES DAILY PRN
Qty: 60 TABLET | Refills: 0 | Status: SHIPPED | OUTPATIENT
Start: 2021-10-08 | End: 2021-11-12 | Stop reason: SDUPTHER

## 2021-10-08 RX ORDER — DOXEPIN HYDROCHLORIDE 50 MG/1
50 CAPSULE ORAL NIGHTLY
Qty: 30 CAPSULE | Refills: 0 | Status: SHIPPED | OUTPATIENT
Start: 2021-10-08 | End: 2021-10-28

## 2021-10-08 RX ORDER — FLUOXETINE HYDROCHLORIDE 40 MG/1
80 CAPSULE ORAL DAILY
Qty: 60 CAPSULE | Refills: 0 | Status: SHIPPED | OUTPATIENT
Start: 2021-10-09 | End: 2021-12-27 | Stop reason: SDUPTHER

## 2021-10-08 RX ORDER — ERGOCALCIFEROL 1.25 MG/1
50000 CAPSULE ORAL WEEKLY
Qty: 4 CAPSULE | Refills: 0 | Status: SHIPPED | OUTPATIENT
Start: 2021-10-14

## 2021-10-08 RX ADMIN — FLUOXETINE HYDROCHLORIDE 80 MG: 20 CAPSULE ORAL at 08:22

## 2021-10-08 RX ADMIN — ASENAPINE MALEATE 2.5 MG: 2.5 TABLET SUBLINGUAL at 08:27

## 2021-10-08 RX ADMIN — INFLUENZA A VIRUS A/VICTORIA/2570/2019 IVR-215 (H1N1) ANTIGEN (PROPIOLACTONE INACTIVATED), INFLUENZA A VIRUS A/CAMBODIA/E0826360/2020 IVR-224 (H3N2) ANTIGEN (PROPIOLACTONE INACTIVATED), INFLUENZA B VIRUS B/VICTORIA/705/2018 BVR-11 ANTIGEN (PROPIOLACTONE INACTIVATED), INFLUENZA B VIRUS B/PHUKET/3073/2013 BVR-1B ANTIGEN (PROPIOLACTONE INACTIVATED) 0.5 ML: 15; 15; 15; 15 INJECTION, SUSPENSION INTRAMUSCULAR at 13:02

## 2021-10-08 NOTE — PLAN OF CARE
Problem: Falls - Risk of:  Goal: Will remain free from falls  Description: Will remain free from falls  Outcome: Ongoing  Goal: Absence of physical injury  Description: Absence of physical injury  Outcome: Ongoing     Problem: Pain:  Goal: Pain level will decrease  Description: Pain level will decrease  Outcome: Ongoing  Goal: Control of acute pain  Description: Control of acute pain  Outcome: Ongoing  Goal: Control of chronic pain  Description: Control of chronic pain  Outcome: Ongoing     Problem: Health Behavior:  Goal: Ability to verbalize adaptive coping strategies to use when suicidal feelings occur will improve  Description: Ability to verbalize adaptive coping strategies to use when suicidal feelings occur will improve  10/7/2021 2349 by Yury Garza RN  Outcome: Ongoing  10/7/2021 1244 by Mikayla Dubose  Outcome: Ongoing  Note:                                                                     Group Therapy Note    Date: 10/7/2021  Start Time: 1030  End Time:  1100  Number of Participants: 2    Type of Group: Cognitive Skills    Wellness Binder Information  Module Name:  Women's Issues  Session Number:  4    Group Goal for Pt: To raise awareness of how thoughts influence feelings    Notes:  Pt demonstrated improved awareness of how thoughts influence feelings by actively participating in group discussion. Status After Intervention:  Unchanged    Participation Level:  Active Listener and Interactive    Participation Quality: Appropriate and Attentive      Speech:  normal      Thought Process/Content: Logical      Affective Functioning: Congruent      Mood: anxious and depressed      Level of consciousness:  Alert and Oriented x4      Response to Learning: Able to verbalize current knowledge/experience, Able to verbalize/acknowledge new learning, and Progressing to goal      Endings: None Reported    Modes of Intervention: Education      Discipline Responsible: Psychoeducational

## 2021-10-08 NOTE — PROGRESS NOTES
Progress Note  Paulie Alexander Case  10/8/2021 6:39 PM  Subjective:   Admit Date:   10/5/2021      CC/ADMIT DX:       Interval History:   Reviewed overnight events and nursing notes. She denies any new physical complaints. I have reviewed all labs/diagnostics from the last 24hrs. ROS:   I have done a 10 point ROS and all are negative, except what is mentioned in the HPI. ADULT DIET; Regular    Medications:      vitamin D  50,000 Units Oral Weekly    doxepin  50 mg Oral Nightly    FLUoxetine  80 mg Oral Daily    melatonin  5 mg Oral Nightly           Objective:   Vitals: BP 99/67   Pulse 73   Temp 98 °F (36.7 °C) (Temporal)   Resp 16   Ht 5' 5\" (1.651 m)   Wt 126 lb 3 oz (57.2 kg)   LMP  (LMP Unknown)   SpO2 97%   BMI 21.00 kg/m²  No intake or output data in the 24 hours ending 10/08/21 1839  General appearance: alert and cooperative with exam  Extremities: extremities normal, atraumatic, no cyanosis or edema  Neurologic:  No obvious focal neurologic deficits. Skin: no rashes    Assessment and Plan: Active Problems:    Major depressive disorder, recurrent severe without psychotic features ([facility] [address]  Resolved Problems:    * No resolved hospital problems. *    Vit D Def    Plan:  1. Continue present medication(s)   2. She is medically stable. I will monitor for any changes or concerns. 3.  Follow with Psych      Discharge planning:   her home     Reviewed treatment plans with the patient and/or family.              Electronically signed by Amira Horton MD on 10/8/2021 at 6:39 PM

## 2021-10-08 NOTE — PROGRESS NOTES
CLINICAL PHARMACY NOTE: MEDS TO BEDS    Total # of Prescriptions Filled: 3   The following medications were delivered to the patient:  · Doxepin 50 mg  · Fluoxetine 40 mg  · Vitamin D (Ergo) 1.25 mg    Additional Documentation:    Cydney Adams said they would be getting the patient samples of the Saphris 2.5mg    Handed scripts to Michael Ramey) at nurses station. Patient was a [employer].

## 2021-10-08 NOTE — PROGRESS NOTES
SW met with treatment team to discuss pt's progress and setbacks. SW 2 was present. Pt reportedly slept fair last night, with medications, appetite is good, attends scheduled group activities, social with peers/staff, independent with ADLS, compliant with medications, behavior has been calm/cooperative, reports severe depression/anxiety, denies SI/HI/AVH, tentative discharge today/weekend, follow-up appointments will be scheduled, continue current treatment plan.

## 2021-10-08 NOTE — PROGRESS NOTES
[facility]  Discharge Note  Bridge Appointment completed: Reviewed Discharge Instructions with patient. Patient verbalizes understanding and agreement with the discharge plan using the teachback method. Referral for Outpatient Tobacco Cessation Counseling, upon discharge (naga X if applicable and completed):    ( )  Hospital staff assisted patient to call Quit Line or faxed referral                                   during hospitalization                  ( )  Recognizing danger situations (included triggers and roadblocks), if not completed on admission                    ( )  Coping skills (new ways to manage stress, exercise, relaxation techniques, changing routine, distraction), if not completed on admission                                                           ( )  Basic information about quitting (benefits of quitting, techniques in how to quit, available resources, if not completed on admission  ( ) Referral for counseling faxed to [facility]   (x ) Patient refused referral  (x ) Patient refused counseling  (x ) Patient refused smoking cessation medication upon discharge    Vaccinations (naga X if applicable and completed):  ( ) Patient states already received influenza vaccine elsewhere  ( ) Patient received influenza vaccine during this hospitalization  (x ) Patient refused influenza vaccine at this time      Pt discharged with followings belongings:   Dentures: None  Vision - Corrective Lenses: Glasses  Hearing Aid: None  Jewelry: Bracelet (2 pendants, 1 earring)  Body Piercings Removed: N/A  Clothing: Footwear, Pants, Shirt, Socks  Were All Patient Medications Collected?: Not Applicable  Other Valuables: [address] sent home with pt. Valuables retrieved from safe and returned to patient. Patient left department with   via  , discharged to  . Patient education on aftercare instructions: yes  Patient verbalize understanding of AVS:  yes.   Suicidal Ideations? No AVH? denies HI?  Negative for homicidal ideation       Status EXAM upon discharge:  Status and Exam  Normal: Yes  Facial Expression: Brightened  Affect: Appropriate  Level of Consciousness: Alert  Mood:Normal: No  Mood: Depressed, Anxious  Motor Activity:Normal: Yes  Motor Activity: Increased  Interview Behavior: Cooperative  Preception: [place] to Person, Elwyn Marcellus to Time, [place] to Place, [place] to Situation  Attention:Normal: No  Attention: Distractible  Thought Processes: Circumstantial  Thought Content:Normal: No  Thought Content: Preoccupations  Hallucinations: None  Delusions: No  Memory:Normal: No  Memory: Poor Remote  Insight and Judgment: No  Insight and Judgment: Poor Judgment, Poor Insight  Present Suicidal Ideation: No  Present Homicidal Ideation: No

## 2021-10-08 NOTE — PLAN OF CARE
Problem: Health Behavior:  Goal: Ability to verbalize adaptive coping strategies to use when suicidal feelings occur will improve  Description: Ability to verbalize adaptive coping strategies to use when suicidal feelings occur will improve  10/8/2021 1138 by Reina Caldera  Outcome: Ongoing  Note:                                                                     Group Therapy Note    Date: 10/8/2021  Start Time: 1000  End Time:  1030  Number of Participants: 3    Type of Group: Psychoeducation    Wellness Binder Information  Module Name:  [address] [place] [address]  Session Number:  1    Group Goal for Pt: To improve knowledge of practical facts about depression    Notes:  Pt demonstrated improved knowledge of practical facts about depression by actively participating in group discussion. Status After Intervention:  Unchanged    Participation Level:  Active Listener and Interactive    Participation Quality: Appropriate and Attentive      Speech:  normal      Thought Process/Content: Logical      Affective Functioning: Congruent      Mood: anxious and depressed      Level of consciousness:  Alert and Oriented x4      Response to Learning: Able to verbalize current knowledge/experience, Able to verbalize/acknowledge new learning, and Progressing to goal      Endings: None Reported    Modes of Intervention: Education      Discipline Responsible: Psychoeducational Specialist      Signature:  Reina Caldera

## 2021-10-08 NOTE — PLAN OF CARE
Problem: Falls - Risk of:  Goal: Will remain free from falls  Description: Will remain free from falls  10/8/2021 0917 by Boo Cruz RN  Outcome: Ongoing  10/7/2021 2349 by Yury Garza RN  Outcome: Ongoing  Goal: Absence of physical injury  Description: Absence of physical injury  10/8/2021 0917 by Boo Cruz RN  Outcome: Ongoing  10/7/2021 2349 by Yury Garza RN  Outcome: Ongoing     Problem: Pain:  Goal: Pain level will decrease  Description: Pain level will decrease  10/8/2021 0917 by oBo Cruz RN  Outcome: Ongoing  10/7/2021 2349 by Yury Garza RN  Outcome: Ongoing  Goal: Control of acute pain  Description: Control of acute pain  10/8/2021 0917 by Boo Cruz RN  Outcome: Ongoing  10/7/2021 2349 by Yury Garza RN  Outcome: Ongoing  Goal: Control of chronic pain  Description: Control of chronic pain  10/8/2021 0917 by Boo Cruz RN  Outcome: Ongoing  10/7/2021 2349 by Yury Garza RN  Outcome: Ongoing     Problem: Health Behavior:  Goal: Ability to verbalize adaptive coping strategies to use when suicidal feelings occur will improve  Description: Ability to verbalize adaptive coping strategies to use when suicidal feelings occur will improve  10/8/2021 0917 by Boo Cruz RN  Outcome: Ongoing  10/7/2021 2349 by Yury Garza RN  Outcome: Ongoing  Goal: Ability to verbalize adaptive coping strategies to use when the urge to self-mutilate occurs will improve  Description: Ability to verbalize adaptive coping strategies to use when the urge to self-mutilate occurs will improve  10/8/2021 0917 by Boo Cruz RN  Outcome: Ongoing  10/7/2021 2349 by Yury Garza RN  Outcome: Ongoing     Problem: Safety:  Goal: Ability to contract for his/her safety will improve  Description: Ability to contract for his/her safety will improve  10/8/2021 0917 by Boo Cruz RN  Outcome: Ongoing  10/7/2021 2349 by Yury Garza RN  Outcome: Ongoing     Problem: Anxiety:  Goal: Level of anxiety will decrease  Description: Level of anxiety will decrease  10/8/2021 0917 by Forest Mendes RN  Outcome: Ongoing  10/7/2021 2349 by Jericho Mercado RN  Outcome: Ongoing

## 2021-10-08 NOTE — PLAN OF CARE
Problem: Health Behavior:  Goal: Ability to verbalize adaptive coping strategies to use when suicidal feelings occur will improve  Description: Ability to verbalize adaptive coping strategies to use when suicidal feelings occur will improve  10/8/2021 1141 by Rudy Oppenheim  Outcome: Ongoing  Note:                                                                     Group Therapy Note    Date: 10/8/2021  Start Time: 1030  End Time:  1100  Number of Participants: 3    Type of Group: Cognitive Skills    Wellness Binder Information  Module Name:  Women's Issues  Session Number:  3    Group Goal for Pt: To improve knowledge of strategies to cope with depression    Notes:  Pt demonstrated improved knowledge of strategies to cope with depression by actively participating in group discussion. Status After Intervention:  Unchanged    Participation Level:  Active Listener and Interactive    Participation Quality: Appropriate and Attentive      Speech:  Normal      Thought Process/Content: Logical      Affective Functioning: Congruent      Mood: anxious and depressed      Level of consciousness:  Alert and Oriented x4      Response to Learning: Able to verbalize current knowledge/experience, Able to verbalize/acknowledge new learning, and Progressing to goal      Endings: None Reported    Modes of Intervention: Education      Discipline Responsible: Psychoeducational Specialist      Signature:  Rudy Oppenheim

## 2021-10-08 NOTE — PROGRESS NOTES
Progress Note  Sandy Slider Case  10/7/2021 10:26 PM  Subjective:   Admit Date:   10/5/2021      CC/ADMIT DX:       Interval History:   Reviewed overnight events and nursing notes. She has no new medical issues. I have reviewed all labs/diagnostics from the last 24hrs. ROS:   I have done a 10 point ROS and all are negative, except what is mentioned in the HPI. ADULT DIET; Regular    Medications:      vitamin D  50,000 Units Oral Weekly    doxepin  50 mg Oral Nightly    influenza virus vaccine  0.5 mL IntraMUSCular Prior to discharge    FLUoxetine  80 mg Oral Daily    melatonin  5 mg Oral Nightly           Objective:   Vitals: /65   Pulse 69   Temp 98.1 °F (36.7 °C) (Temporal)   Resp 18   Ht 5' 5\" (1.651 m)   Wt 126 lb 3 oz (57.2 kg)   LMP  (LMP Unknown)   SpO2 96%   BMI 21.00 kg/m²  No intake or output data in the 24 hours ending 10/07/21 2226  General appearance: alert and cooperative with exam  Extremities: extremities normal, atraumatic, no cyanosis or edema  Neurologic:  No obvious focal neurologic deficits. Skin: no rashes    Assessment and Plan: Active Problems:    Major depressive disorder, recurrent severe without psychotic features ([address]  Resolved Problems:    * No resolved hospital problems. *    Vit D Def    Plan:  1. Continue present medication(s)   2. Replace Vit D  3. Follow with Psych      Discharge planning:   her home     Reviewed treatment plans with the patient and/or family.              Electronically signed by Ok Brown MD on 10/7/2021 at 10:26 PM

## 2021-10-08 NOTE — PROGRESS NOTES
Discharge Note     Pt discharging on this date. Pt denies SI, HI, and AVH at this time. Pt reports improvement in behavior and is leaving unit in overall good condition. SW and pt discussed pt's follow up appointments and importance of attending appointments as scheduled, pt voiced understanding and agreement. Pt and SW also discussed pt safety plan and pt able to verbally identify: warning signs, coping strategies, places and people that help make the pt feel better/distract negative thoughts, friends/family/agencies/professionals the pt can reach out to in a crisis, and something that is important to the pt/worth living for. Pt provided the national suicide prevention hotline number (6-690-362-220.614.4072) as well as local community behavioral health [facility]) crisis number for emergencies (3-187.441.5039).      Pt to follow up with:  [facility] 10__/_13_/21 @ Dr. Vince Baptiste for medication management,   Referral to out patient tobacco cessation counseling treatment:    Patient refused referral to outpatient tobacco cessation counseling    SW offered to assist pt with transportation, pt reports that she has transportation home
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
## 2021-10-08 NOTE — PROGRESS NOTES
I Daily Shift Assessment-Geriatric Unit  Nursing Progress Note          Room: [address] [place] [address]-   Name: Dirk Moreira Case   Age: 73 y.o. Gender: female   Dx: <principal problem not specified>  Precautions: suicide risk and fall risk  Inpatient Status: voluntary     SLEEP:    Sleep: Yes,   Sleep Quality Fair   Hours Slept:    Sleep Medications: Yes doxepine 50 mg, melatonin 5 mg   PRN Sleep Meds: No       MEDICAL:      Other PRN Meds: Yes and  Saphris 2.5 mg for anxiety  Med Compliant: Yes   Accu-Chek: no  Oxygen/CPAP/BiPAP: No  CIWA/CINA: No   PAIN Assessment: none  Side Effects from medication: Yes    Is Patient experiencing any respiratory symptoms (headache, fever, body aches, cough. Tracey Aquino ): no  Patient educated by nursing to practice social distancing, wear masks, wash hands frequently: yes      Metabolic Screening:    Lab Results   Component Value Date    LABA1C 5.7 10/07/2021       Lab Results   Component Value Date    CHOL 228 (H) 10/07/2021    CHOL 229 (H) 02/23/2021     Lab Results   Component Value Date    TRIG 167 (H) 10/07/2021    TRIG 105 02/23/2021     Lab Results   Component Value Date    HDL 53 (L) 10/07/2021    HDL 43 (L) 02/23/2021     No components found for: LDLCAL  No results found for: LABVLDL      Body mass index is 21 kg/m². BP Readings from Last 2 Encounters:   10/07/21 104/65   07/07/21 103/65         PSYCH:     SI denies suicidal ideation    HI Negative for homicidal ideation        AVH:Absent      Depression: 8/9 Anxiety: 8 improving      GENERAL:      Appetite: good  Social: Yes Speech: normal, hard to hear at times  Appearance: appropriate, dress attire  Assistive Devices: noneLevel of Assist: Independent      GROUP:    Group Participation: Yes  Participation Level: Sharing, and Attentive  Participation QualityAppropriate and Attentive    Notes: pt in TV area this evening , social with peers and staff nurses. Pt states that depression is the same and anxiety are improving.  Pt had doxepine 50 mg for sleep this evening and d/c trazodone. Pt had fair sleep, pt attempted to go to sleep, due to not able to sleep, pt anxiety became elevated and anxiety elevated and pt requested Saphris 2.5 for anxiety. Pt able to sleep and rest this night after medication administered. Pt denies SI,HI and AVH this evening. Pt is med compliant, performs ADL's, attended groups, and is cooperative with staff with all medications. Pt appetite is good and pt behavior is calm. Pt is soft spoken and pleasant. Will continue to monitor for safety and comfort.        Electronically signed by Manan Fuentes RN on 10/8/21 at 3:53 AM CDT

## 2021-10-08 NOTE — GROUP NOTE
Group Therapy Note    Date: 10/8/2021    Group Start Time: 0830  Group End Time: 0930  Group Topic: Community Meeting    [facility] GERIATRIC BEHAVIORAL UNIT    Krish Kan RN        Group Therapy Note    Attendees:          Patient's Goal:  \"decrease anxiety\"    Notes:  Pt calm and cooperative during group    Status After Intervention:  Unchanged    Participation Level:  Active Listener    Participation Quality: Appropriate      Speech:  normal      Thought Process/Content: Logical      Affective Functioning: Congruent      Mood: depressed      Level of consciousness:  Alert      Response to Learning: Able to verbalize current knowledge/experience, Able to verbalize/acknowledge new learning and Able to retain information      Endings: None Reported    Modes of Intervention: Education, Support and Socialization      Discipline Responsible: Registered Nurse      Signature:  Carrie Lopez RN

## 2021-10-09 NOTE — PROGRESS NOTES
SW attempted to contact pt to follow-up with her after she discharged from the unit yesterday to see if she had any questions or concerns that needed to be addressed. SW called the contact number listed for the pt and it went to voicemail. SW left a message, including the unit's contact number, stating that if she has any questions or concerns, to please call back.

## 2021-10-12 ENCOUNTER — TELEPHONE (OUTPATIENT)
Dept: PSYCHIATRY | Age: 66
End: 2021-10-12

## 2021-10-12 NOTE — TELEPHONE ENCOUNTER
Called pt for appointment reminder.     -Pt confirmed    Electronically signed by Scout Zamudio MA on 10/12/2021 at 3:11 PM

## 2021-10-13 ENCOUNTER — TELEPHONE (OUTPATIENT)
Dept: PSYCHIATRY | Age: 66
End: 2021-10-13

## 2021-10-13 ENCOUNTER — OFFICE VISIT (OUTPATIENT)
Dept: PSYCHIATRY | Age: 66
End: 2021-10-13
Payer: MEDICARE

## 2021-10-13 VITALS
HEIGHT: 65 IN | HEART RATE: 96 BPM | TEMPERATURE: 98.7 F | SYSTOLIC BLOOD PRESSURE: 135 MMHG | WEIGHT: 128.5 LBS | BODY MASS INDEX: 21.41 KG/M2 | OXYGEN SATURATION: 95 % | DIASTOLIC BLOOD PRESSURE: 79 MMHG

## 2021-10-13 DIAGNOSIS — F41.9 ANXIETY: ICD-10-CM

## 2021-10-13 DIAGNOSIS — F32.1 CURRENT MODERATE EPISODE OF MAJOR DEPRESSIVE DISORDER, UNSPECIFIED WHETHER RECURRENT (HCC): Primary | ICD-10-CM

## 2021-10-13 DIAGNOSIS — F43.12 CHRONIC POST-TRAUMATIC STRESS DISORDER (PTSD): ICD-10-CM

## 2021-10-13 PROCEDURE — 3017F COLORECTAL CA SCREEN DOC REV: CPT | Performed by: PSYCHIATRY & NEUROLOGY

## 2021-10-13 PROCEDURE — 99213 OFFICE O/P EST LOW 20 MIN: CPT | Performed by: PSYCHIATRY & NEUROLOGY

## 2021-10-13 PROCEDURE — 4040F PNEUMOC VAC/ADMIN/RCVD: CPT | Performed by: PSYCHIATRY & NEUROLOGY

## 2021-10-13 PROCEDURE — G8428 CUR MEDS NOT DOCUMENT: HCPCS | Performed by: PSYCHIATRY & NEUROLOGY

## 2021-10-13 PROCEDURE — 1111F DSCHRG MED/CURRENT MED MERGE: CPT | Performed by: PSYCHIATRY & NEUROLOGY

## 2021-10-13 PROCEDURE — G8400 PT W/DXA NO RESULTS DOC: HCPCS | Performed by: PSYCHIATRY & NEUROLOGY

## 2021-10-13 PROCEDURE — 1090F PRES/ABSN URINE INCON ASSESS: CPT | Performed by: PSYCHIATRY & NEUROLOGY

## 2021-10-13 PROCEDURE — 1036F TOBACCO NON-USER: CPT | Performed by: PSYCHIATRY & NEUROLOGY

## 2021-10-13 PROCEDURE — G8482 FLU IMMUNIZE ORDER/ADMIN: HCPCS | Performed by: PSYCHIATRY & NEUROLOGY

## 2021-10-13 PROCEDURE — 1123F ACP DISCUSS/DSCN MKR DOCD: CPT | Performed by: PSYCHIATRY & NEUROLOGY

## 2021-10-13 PROCEDURE — G8420 CALC BMI NORM PARAMETERS: HCPCS | Performed by: PSYCHIATRY & NEUROLOGY
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
## 2021-10-25 ENCOUNTER — TELEPHONE (OUTPATIENT)
Dept: PSYCHIATRY | Age: 66
End: 2021-10-25

## 2021-10-25 NOTE — TELEPHONE ENCOUNTER
Pt left VM stating that her sleep med is not working. She stated that she has taken 2 tabs  (looks like she takes Doxepin for sleep) for the last 2 nights and is sleeping about 4 hrs per night. She stated that she was \"open for suggestions\". Info sent to Via Frank Martinez who is covering for Dr Laura Sherman today.

## 2021-10-25 NOTE — TELEPHONE ENCOUNTER
Pt was made aware that Dr Danny De Luna is off today. Per direction of CONSTANZA SMALLS, pt was directed to take the Doxepin only as directed (she reported for the last 2 nights she had taken 2 nightly instead of the prescribed 1 cap) and that Dr Danny De Luna would be made aware of her sleep issues tomorrow. Pt verbalized understanding. Pt stated that taking the 2 caps at night did not help any more that the 1 cap. \"Still just sleeping about 4 hrs per night\".

## 2021-10-26 ENCOUNTER — TELEPHONE (OUTPATIENT)
Dept: PSYCHIATRY | Age: 66
End: 2021-10-26

## 2021-10-26 NOTE — TELEPHONE ENCOUNTER
Attempted to contact pt to inform her of Dr Saniya Diehl directives regarding her sleep issues-no answer-left VM with request for call back.

## 2021-10-26 NOTE — TELEPHONE ENCOUNTER
Per direction of Dr Tristan Ivory, pt was informed to take the Doxepin only as prescribed and do not double up on the dose. Pt also instructed to take Melatonin (OTC) 3 or 5 mg at night in addition to the Doxepin. Pt stated that she was already taking Melatonin 10 mg at night with the Doxepin and sleeping anywhere from 3 to 5 hrs per night. Pt stated that Trazodone 150 mg has \"worked great in the past but can cause a false positive for some meds\". Pt stated that she would like to go back on the Trazodone and that she could provide proof that she is on the med if needed. She was informed that Dr Tristan Ivory would be given the above info and she would be called back today or tomorrow.

## 2021-10-27 ENCOUNTER — TELEPHONE (OUTPATIENT)
Dept: PSYCHIATRY | Age: 66
End: 2021-10-27

## 2021-10-27 RX ORDER — TRAZODONE HYDROCHLORIDE 150 MG/1
150 TABLET ORAL NIGHTLY
Qty: 30 TABLET | Refills: 1 | Status: SHIPPED | OUTPATIENT
Start: 2021-10-27 | End: 2021-12-27 | Stop reason: SDUPTHER

## 2021-10-27 NOTE — TELEPHONE ENCOUNTER
Per direction of Dr Alfredo Moya, pt was contacted and informed to stop the Doxepin. She can continue the Melatonin and that the doctor will send in a RX for Trazodone later today as pt had requested. Pt stated that the anxiety med is helping and that her mood is better. Pt reported that her son is in intermediate and her daughter in law is in rehab. \"So that my be part of why I am feeling better\". Pt was calm and able to stay on topic during this phone call.

## 2021-10-28 ENCOUNTER — TELEPHONE (OUTPATIENT)
Dept: PSYCHIATRY | Age: 66
End: 2021-10-28

## 2021-10-28 NOTE — TELEPHONE ENCOUNTER
Pt made aware that RX for Trazodone 150 mg had been sent to [address] [place] [address] per Dr Shukri Pete.

## 2021-11-12 ENCOUNTER — TELEPHONE (OUTPATIENT)
Dept: PSYCHIATRY | Age: 66
End: 2021-11-12

## 2021-11-12 RX ORDER — ASENAPINE MALEATE 2.5 MG/1
2.5 TABLET SUBLINGUAL 2 TIMES DAILY PRN
Qty: 5 TABLET | Refills: 0 | Status: CANCELLED | OUTPATIENT
Start: 2021-11-12 | End: 2021-11-16

## 2021-11-12 RX ORDER — ASENAPINE MALEATE 2.5 MG/1
2.5 TABLET SUBLINGUAL 2 TIMES DAILY PRN
Qty: 5 TABLET | Refills: 0 | Status: SHIPPED | OUTPATIENT
Start: 2021-11-12 | End: 2021-11-17 | Stop reason: SDUPTHER

## 2021-11-12 NOTE — TELEPHONE ENCOUNTER
Pt called stating that [address] [place] [address] did not have Saphris 2.5 mg in stock either. Called pharmacies and Marty Rodriguez at [facility] said they have the med is stock. He will request the RX for #5 to be transferred from [facility] as requested by the pt. Pt aware should be able to get the med at [facility] later today.

## 2021-11-12 NOTE — TELEPHONE ENCOUNTER
Pt aware RX for #5 Saphris sent to her pharmacy per Trina SMALLS. Pt called and stated that [facility] is out of this med. She is going to see if she can get the RX transferred to [facility].
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
## 2021-11-17 NOTE — TELEPHONE ENCOUNTER
Laine Espinoza R Case called to request a refill on her medication. Pt requesting RX for 6 tabs be sent to her pharmacy for her to pay for-no RX insurance-no samples available. Last office visit : 10/13/2021 with Dr Aria Katz  Next office visit : 1/12/2022 with Dr Aria Katz. Requested Prescriptions     Pending Prescriptions Disp Refills    asenapine maleate (SAPHRIS) 2.5 MG SUBL sublingual tablet 6 tablet 0     Sig: Place 1 tablet under the tongue 2 times daily as needed (anxiety, agitation)            Jazzmine Ho RNOffice Visit    10/13/2021  [address] Psychiatry Weston Ratliff MD    Psychiatry  Current moderate episode of major depressive disorder, unspecified whether recurrent ([address] +2 more    Dx  Medication Check    Reason for Visit       Progress Notes  Lev Pedersen MD (Physician) [facility] Psychiatry  Expand All Collapse All           Qing Braun Case 1955                               Chief Complaint   Patient presents with    Medication Check            Subjective:       59-year-old white female with history of depression, PTSD and anxiety. Patient was admitted to [facility] psychiatry unit last week and discharged on a higher dose of Prozac and also doxepin. Trazodone and BuSpar were discontinued. Patient reportedly tested false positive for methamphetamine.     Patient is calm and cooperative when seen today. She looks stylish and is wearing make-up and jewelry. States she has been very anxious recently due to having issues with her son who is in [facility] again. He is facing charges due to him holding a gun to her head and stealing her car. Patient states she is planning to drop charges. States he never held a gun to her head and she gave him the car keys. She is having issues with daughter-in-law and is planning to evict her. Patient has a court hearing tomorrow related to her drug charge. She denies suicidal ideation. Sleeping poorly due to anxiety.  Good appetite.     Current Substance Use: Denies alcohol use. Denies drug use.   Denies tobacco use.     BP: /79   Pulse 96   Temp 98.7 °F (37.1 °C)   Ht 5' 5\" (1.651 m)   Wt 128 lb 8 oz (58.3 kg)   LMP  (LMP Unknown)   SpO2 95%   BMI 21.38 kg/m²         Review of Systems - 14 point review:  Negative     Constitutional: (fevers, chills, night sweats, wt loss/gain, change in appetite, fatigue, somnolence)     HEENT: (ear pain or discharge, hearing loss, ear ringing, sinus pressure, nosebleed, nasal discharge, sore throat, oral sores, tooth pain, bleeding gums, hoarse voice, neck pain)      Cardiovascular: (HTN, chest pain, elevated cholesterol/lipids, palpitations, leg swelling, leg pain with walking)     Respiratory: (cough, wheezing, snoring, SOB with activity (dyspnea), SOB while lying flat (orthopnea), awakening with severe SOB (paroxysmal nocturnal dyspnea))     Gastrointestinal: (NVD, constipation, abdominal pain, bright red stools, black tarry stools, stool incontinence)     Genitourinary:  (pelvic pain, burning or frequency of urination, urinary urgency, blood in urine incomplete bladder emptying, urinary incontinence, STD; MEN: testicular pain or swelling, erectile dysfunction; WOMEN: LMP, heavy menstrual bleeding (menorrhagia), irregular periods, postmenopausal bleeding, menstrual pain (dymenorrhea, vaginal discharge)     Musculoskeletal: (bone pain/fracture, joint pain or swelling, musle pain)     Integumentary: (rashes, acne, non-healing sores, itching, breast lumps, breast pain, nipple discharge, hair loss)     Neurologic: (HA, muscle weakness, paresthesias (numbness, coldness, crawling or prickling), memory loss, seizure, dizziness)     Psychiatric:  (anxiety, sadness, irritability/anger, insomnia, suicidality)     Endocrine: (heat or cold intolerance, excessive thirst (polydipsia), excessive hunger (polyphagia))     Immune/Allergic: (hives, seasonal or environmental allergies, HIV exposure)     Hematologic/Lymphatic: (lymph node enlargement, easy bleeding or bruising)     History obtained via chart review and patient     PCP is No primary care provider on file.         Current Meds:     Home Medications           Prior to Admission medications    Medication Sig Start Date End Date Taking? Authorizing Provider   FLUoxetine (PROZAC) 40 MG capsule Take 2 capsules by mouth daily 10/9/21     Roque Mcmillan MD   doxepin [facility]) 50 MG capsule Take 1 capsule by mouth nightly 10/8/21     Roque Mcmillan MD   vitamin D (ERGOCALCIFEROL) 1.25 MG (64375 UT) CAPS capsule Take 1 capsule by mouth once a week 10/14/21     Roque Mcmillan MD   asenapine maleate (SAPHRIS) 2.5 MG SUBL sublingual tablet Place 1 tablet under the tongue 2 times daily as needed (anxiety, agitation) 10/8/21     Roque Mcmillan MD            MSE:  Appearance: Stated age, short curly hair, wears make up and jewellery  Behavior: Calm, cooperative, smiling at times  Speech: Normal in tone, volume, and quality. No slurring, dysarthria or pressured speech noted. Mood: \"Very anxious\"   Affect: Incongruent, Bright  Thought Process: Appears linear, logical and goal oriented. Causality appears intact. Thought Content: Denies active suicidal and homicidal ideations. No overt delusions or paranoia appreciated. Perceptions: Denies auditory or visual hallucinations at present time. Not responding to internal stimuli. Concentration: Intact. Orientation: to person, place, date, and situation. Language: Intact. Fund of information: Intact. Memory: Recent and remote appear intact. Impulsivity: Limited. Neurovegitative: Fair appetite and sleep. Insight: Fair.    Judgment: Fair.              Lab Results   Component Value Date      10/05/2021     K 4.1 10/05/2021      10/05/2021     CO2 24 10/05/2021     BUN 10 10/05/2021     CREATININE 0.9 10/05/2021     GLUCOSE 108 10/05/2021     CALCIUM 8.7 (L) 10/05/2021     PROT 6.1 (L) 10/05/2021     LABALBU 4.1 10/05/2021     BILITOT <0.2 10/05/2021     ALKPHOS 55 10/05/2021     AST 15 10/05/2021     ALT 16 10/05/2021     LABGLOM >60 10/05/2021     GFRAA >59 10/05/2021            Lab Results   Component Value Date      10/05/2021     K 4.1 10/05/2021     K 3.6 02/21/2021      10/05/2021     CO2 24 10/05/2021     BUN 10 10/05/2021     CREATININE 0.9 10/05/2021     GLUCOSE 108 10/05/2021     CALCIUM 8.7 10/05/2021            Lab Results   Component Value Date     CHOL 228 (H) 10/07/2021            Lab Results   Component Value Date     TRIG 167 (H) 10/07/2021            Lab Results   Component Value Date     HDL 53 (L) 10/07/2021            Lab Results   Component Value Date     LDLCALC 142 10/07/2021      No results found for: LABVLDL, VLDL  No results found for: [facility]        Lab Results   Component Value Date     LABA1C 5.7 10/07/2021      No results found for: EAG  Lab Results   Component Value Date     TSHFT4 0.65 02/23/2021            Lab Results   Component Value Date     VITD25 28.9 (L) 10/07/2021         Assessments Administered:        Assessment:    1. Current moderate episode of major depressive disorder, unspecified whether recurrent ([facility] [address]    2. Chronic post-traumatic stress disorder (PTSD)    3. Anxiety          No evidence of acute suicidality, homicidality or psychosis observed. Psychiatrically stable and doing better.     Plan:  1. Continue current regimen. The risks, benefits, side effects, indications, contraindications, and adverse effects of the medications have been discussed. Yes.  2. The pt has verbalized understanding and has capacity to give informed consent. 3. The Yoana Cordero report has been reviewed according to [facility] regulations. 4. Supportive therapy offered. We will send a referral for therapy given that our therapist is leaving. 5. Follow up:    Return in about 3 months (around 1/13/2022). 6. The patient has been advised to call with any problems.   7. Controlled substance Treatment Plan: NA  8. The above listed medications have been continued, modifications in meds and other orders/labs as follows:                   Encounter Medications    No orders of the defined types were placed in this encounter.                  No orders of the defined types were placed in this encounter.        9. Additional comments:            10. Over 50% of the total visit time of 25  minutes was spent on counseling and/or coordination of care of:                         1. Current moderate episode of major depressive disorder, unspecified whether recurrent ([facility] [address]    2. Chronic post-traumatic stress disorder (PTSD)    3.  Anxiety                Rojas Jade MD medication change been

## 2021-11-18 ENCOUNTER — TELEPHONE (OUTPATIENT)
Dept: PSYCHIATRY | Age: 66
End: 2021-11-18

## 2021-11-18 RX ORDER — ASENAPINE MALEATE 2.5 MG/1
2.5 TABLET SUBLINGUAL 2 TIMES DAILY PRN
Qty: 14 TABLET | Refills: 2 | Status: SHIPPED | OUTPATIENT
Start: 2021-11-18 | End: 2022-01-26

## 2021-11-18 NOTE — TELEPHONE ENCOUNTER
VM left to inform pt that RX she requested (Saphris) had been sent to her pharmacy per Dr Nuria Servin. Pt encouraged to call back with any questions.

## 2021-11-18 NOTE — TELEPHONE ENCOUNTER
Pt called stating that [facility] said they had the [address] in stock yesterday but now that the RX has been sent they do not have it. Pt will call the pharmacy where she got it filled last week and have RX transferred if they have it. She will call back if has any issues. She stated she forgot to come by and get the pt assist form for Elsa to completed-form mailed to her as she requested.

## 2021-11-30 ENCOUNTER — TELEPHONE (OUTPATIENT)
Dept: PSYCHIATRY | Age: 66
End: 2021-11-30

## 2021-11-30 NOTE — TELEPHONE ENCOUNTER
Per direction of Dr Iván Armstrong, pt given Saphris 2.5 mg samples #60 with verbal and written directions. Pt has been on this med and verbalized understanding.

## 2021-12-27 ENCOUNTER — TELEPHONE (OUTPATIENT)
Dept: PSYCHIATRY | Age: 66
End: 2021-12-27

## 2021-12-27 RX ORDER — FLUOXETINE HYDROCHLORIDE 40 MG/1
80 CAPSULE ORAL DAILY
Qty: 60 CAPSULE | Refills: 3 | Status: SHIPPED | OUTPATIENT
Start: 2021-12-27 | End: 2022-04-04 | Stop reason: SDUPTHER

## 2021-12-27 RX ORDER — TRAZODONE HYDROCHLORIDE 150 MG/1
150 TABLET ORAL NIGHTLY
Qty: 30 TABLET | Refills: 3 | Status: SHIPPED | OUTPATIENT
Start: 2021-12-27 | End: 2022-04-15 | Stop reason: SDUPTHER

## 2021-12-27 NOTE — TELEPHONE ENCOUNTER
Per direction of Dr Magan Oneil,  Pt given Saphris 2.5 mg samples #60 with verbal and written directions to place one under the tongue up to 2 times daily as needed for anxiety/agitation. Pt had been on this med and has good understanding.

## 2021-12-27 NOTE — TELEPHONE ENCOUNTER
Pharmacy sent a request to refill pt medication. Last office visit : 10/13/2021 Renetta Adorno MD  Next office visit : 1/12/2022 Renetta Adorno MD    Requested Prescriptions     Pending Prescriptions Disp Refills    FLUoxetine (PROZAC) 40 MG capsule 60 capsule 0     Sig: Take 2 capsules by mouth daily    traZODone (DESYREL) 150 MG tablet 30 tablet 1     Sig: Take 1 tablet by mouth nightly            Shameca William          10/13/2021  [address] Psychiatry Kristal Vanessa MD    Psychiatry  Current moderate episode of major depressive disorder, unspecified whether recurrent ([facility] [address] +2 more    Dx  Medication Check    Reason for Visit       Progress Notes  Brittni Rapp MD (Physician) [facility] Psychiatry  Expand All Collapse All           Jos Sake Case 1955                               Chief Complaint   Patient presents with    Medication Check            Subjective:       45-year-old white female with history of depression, PTSD and anxiety. Patient was admitted to [facility] psychiatry unit last week and discharged on a higher dose of Prozac and also doxepin. Trazodone and BuSpar were discontinued. Patient reportedly tested false positive for methamphetamine.     Patient is calm and cooperative when seen today. She looks stylish and is wearing make-up and jewelry. States she has been very anxious recently due to having issues with her son who is in [facility]. He is facing charges due to him holding a gun to her head and stealing her car. Patient states she is planning to drop charges. States he never held a gun to her head and she gave him the car keys. She is having issues with daughter-in-law and is planning to evict her. Patient has a court hearing tomorrow related to her drug charge. She denies suicidal ideation. Sleeping poorly due to anxiety. Good appetite.     Current Substance Use: Denies alcohol use. Denies drug use.   Denies tobacco use.     BP: /79   Pulse 96   Temp 98.7 °F (37.1 °C) Ht 5' 5\" (1.651 m)   Wt 128 lb 8 oz (58.3 kg)   LMP  (LMP Unknown)   SpO2 95%   BMI 21.38 kg/m²         Review of Systems - 14 point review:  Negative     Constitutional: (fevers, chills, night sweats, wt loss/gain, change in appetite, fatigue, somnolence)     HEENT: (ear pain or discharge, hearing loss, ear ringing, sinus pressure, nosebleed, nasal discharge, sore throat, oral sores, tooth pain, bleeding gums, hoarse voice, neck pain)      Cardiovascular: (HTN, chest pain, elevated cholesterol/lipids, palpitations, leg swelling, leg pain with walking)     Respiratory: (cough, wheezing, snoring, SOB with activity (dyspnea), SOB while lying flat (orthopnea), awakening with severe SOB (paroxysmal nocturnal dyspnea))     Gastrointestinal: (NVD, constipation, abdominal pain, bright red stools, black tarry stools, stool incontinence)     Genitourinary:  (pelvic pain, burning or frequency of urination, urinary urgency, blood in urine incomplete bladder emptying, urinary incontinence, STD; MEN: testicular pain or swelling, erectile dysfunction; WOMEN: LMP, heavy menstrual bleeding (menorrhagia), irregular periods, postmenopausal bleeding, menstrual pain (dymenorrhea, vaginal discharge)     Musculoskeletal: (bone pain/fracture, joint pain or swelling, musle pain)     Integumentary: (rashes, acne, non-healing sores, itching, breast lumps, breast pain, nipple discharge, hair loss)     Neurologic: (HA, muscle weakness, paresthesias (numbness, coldness, crawling or prickling), memory loss, seizure, dizziness)     Psychiatric:  (anxiety, sadness, irritability/anger, insomnia, suicidality)     Endocrine: (heat or cold intolerance, excessive thirst (polydipsia), excessive hunger (polyphagia))     Immune/Allergic: (hives, seasonal or environmental allergies, HIV exposure)     Hematologic/Lymphatic: (lymph node enlargement, easy bleeding or bruising)     History obtained via chart review and patient     PCP is No primary care provider on file.         Current Meds:     Home Medications           Prior to Admission medications    Medication Sig Start Date End Date Taking? Authorizing Provider   FLUoxetine (PROZAC) 40 MG capsule Take 2 capsules by mouth daily 10/9/21     José Luis Bourgeois MD   doxepin [facility]) 50 MG capsule Take 1 capsule by mouth nightly 10/8/21     José Luis Bourgeois MD   vitamin D (ERGOCALCIFEROL) 1.25 MG (40385 UT) CAPS capsule Take 1 capsule by mouth once a week 10/14/21     José Luis Bourgeois MD   asenapine maleate (SAPHRIS) 2.5 MG SUBL sublingual tablet Place 1 tablet under the tongue 2 times daily as needed (anxiety, agitation) 10/8/21     José Luis Bourgeois MD            MSE:  Appearance: Stated age, short curly hair, wears make up and jewellery  Behavior: Calm, cooperative, smiling at times  Speech: Normal in tone, volume, and quality. No slurring, dysarthria or pressured speech noted. Mood: \"Very anxious\"   Affect: Incongruent, Bright  Thought Process: Appears linear, logical and goal oriented. Causality appears intact. Thought Content: Denies active suicidal and homicidal ideations. No overt delusions or paranoia appreciated. Perceptions: Denies auditory or visual hallucinations at present time. Not responding to internal stimuli. Concentration: Intact. Orientation: to person, place, date, and situation. Language: Intact. Fund of information: Intact. Memory: Recent and remote appear intact. Impulsivity: Limited. Neurovegitative: Fair appetite and sleep. Insight: Fair.    Judgment: Fair.              Lab Results   Component Value Date      10/05/2021     K 4.1 10/05/2021      10/05/2021     CO2 24 10/05/2021     BUN 10 10/05/2021     CREATININE 0.9 10/05/2021     GLUCOSE 108 10/05/2021     CALCIUM 8.7 (L) 10/05/2021     PROT 6.1 (L) 10/05/2021     LABALBU 4.1 10/05/2021     BILITOT <0.2 10/05/2021     ALKPHOS 55 10/05/2021     AST 15 10/05/2021     ALT 16 10/05/2021     LABGLOM >60 10/05/2021   GFRAA >59 10/05/2021            Lab Results   Component Value Date      10/05/2021     K 4.1 10/05/2021     K 3.6 02/21/2021      10/05/2021     CO2 24 10/05/2021     BUN 10 10/05/2021     CREATININE 0.9 10/05/2021     GLUCOSE 108 10/05/2021     CALCIUM 8.7 10/05/2021            Lab Results   Component Value Date     CHOL 228 (H) 10/07/2021            Lab Results   Component Value Date     TRIG 167 (H) 10/07/2021            Lab Results   Component Value Date     HDL 53 (L) 10/07/2021            Lab Results   Component Value Date     LDLCALC 142 10/07/2021      No results found for: LABVLDL, VLDL  No results found for: [facility]        Lab Results   Component Value Date     LABA1C 5.7 10/07/2021      No results found for: EAG  Lab Results   Component Value Date     TSHFT4 0.65 02/23/2021            Lab Results   Component Value Date     VITD25 28.9 (L) 10/07/2021         Assessments Administered:        Assessment:    1. Current moderate episode of major depressive disorder, unspecified whether recurrent ([facility] [address]    2. Chronic post-traumatic stress disorder (PTSD)    3. Anxiety          No evidence of acute suicidality, homicidality or psychosis observed. Psychiatrically stable and doing better.     Plan:  1. Continue current regimen. The risks, benefits, side effects, indications, contraindications, and adverse effects of the medications have been discussed. Yes.  2. The pt has verbalized understanding and has capacity to give informed consent. 3. The Nolvia Salgado report has been reviewed according to [facility] regulations. 4. Supportive therapy offered. We will send a referral for therapy given that our therapist is leaving. 5. Follow up:    Return in about 3 months (around 1/13/2022). 6. The patient has been advised to call with any problems. 7. Controlled substance Treatment Plan: NA  8.  The above listed medications have been continued, modifications in meds and other orders/labs as follows:

## 2022-01-11 ENCOUNTER — TELEPHONE (OUTPATIENT)
Dept: PSYCHIATRY | Age: 67
End: 2022-01-11

## 2022-01-11 NOTE — TELEPHONE ENCOUNTER
Called and lvm reminding pt of her appt for [email or web address]    Electronically signed by Ed Rodriguez on 1/11/2022 at 11:46 AM

## 2022-01-12 ENCOUNTER — VIRTUAL VISIT (OUTPATIENT)
Dept: PSYCHIATRY | Age: 67
End: 2022-01-12
Payer: MEDICARE

## 2022-01-12 DIAGNOSIS — F41.9 ANXIETY: ICD-10-CM

## 2022-01-12 DIAGNOSIS — F33.0 MAJOR DEPRESSIVE DISORDER, RECURRENT, MILD (HCC): Primary | ICD-10-CM

## 2022-01-12 DIAGNOSIS — F43.12 CHRONIC POST-TRAUMATIC STRESS DISORDER (PTSD): ICD-10-CM

## 2022-01-12 PROCEDURE — 99443 PR PHYS/QHP TELEPHONE EVALUATION 21-30 MIN: CPT | Performed by: PSYCHIATRY & NEUROLOGY

## 2022-01-12 NOTE — PROGRESS NOTES
Merlin Child Case is a 73 y.o. female evaluated via telephone on 1/12/2022. Consent:  She and/or health care decision maker is aware that that she may receive a bill for this telephone service, depending on her insurance coverage, and has provided verbal consent to proceed: Yes      Documentation:  I communicated with the patient and/or health care decision maker about - see below  Details of this discussion including any medical advice provided - see below      I affirm this is a Patient Initiated Episode with a Patient who has not had a related appointment within my department in the past 7 days or scheduled within the next 24 hours. Patient identification was verified at the start of the visit: Yes    Total Time: minutes: 21-30 minutes    The visit was conducted pursuant to the emergency declaration under the [address] [place] [address], [address] [place] [address] authority and the [employer] Act. Patient identification was verified, and a caregiver was present when appropriate. The patient was located in a state where the provider was credentialed to provide care. Note: not billable if this call serves to triage the patient into an appointment for the relevant concern      Ilya Whaley MD         Merlin Child Case 1955      Chief Complaint   Patient presents with    Medication Check         Subjective:      71-year-old white female with history of depression, PTSD and anxiety presents for follow up via telephone. Patient is on Prozac, Trazodone and PRN Saphris. Reports compliance. Patient is calm and cooperative on the phone. \"Doing well. Both depression and anxiety improved. \". She denies suicidal ideation. Sleeping poorly on some days. Good appetite. Gained some weight. Reconnected with her family and grandchildren. Spending more time with them. Continues with community service.  States daughter-in-law will be moving out in Feb. Reports some issues with concentration - feels may be related to a fall a year ago. Will see a neurologist. Report frequent urination and h/o ovarian cyst - will follow up with PCP. Current Substance Use: Denies alcohol use. Denies drug use. Denies tobacco use.     BP: LMP  (LMP Unknown)       Review of Systems - 14 point review:  Negative except    Constitutional: (fevers, chills, night sweats, wt loss/gain, change in appetite, fatigue, somnolence)    HEENT: (ear pain or discharge, hearing loss, ear ringing, sinus pressure, nosebleed, nasal discharge, sore throat, oral sores, tooth pain, bleeding gums, hoarse voice, neck pain)      Cardiovascular: (HTN, chest pain, elevated cholesterol/lipids, palpitations, leg swelling, leg pain with walking)    Respiratory: (cough, wheezing, snoring, SOB with activity (dyspnea), SOB while lying flat (orthopnea), awakening with severe SOB (paroxysmal nocturnal dyspnea))    Gastrointestinal: (NVD, constipation, abdominal pain, bright red stools, black tarry stools, stool incontinence)     Genitourinary:  (pelvic pain, burning or frequency of urination, urinary urgency, blood in urine incomplete bladder emptying, urinary incontinence, STD; MEN: testicular pain or swelling, erectile dysfunction; WOMEN: LMP, heavy menstrual bleeding (menorrhagia), irregular periods, postmenopausal bleeding, menstrual pain (dymenorrhea, vaginal discharge)    Musculoskeletal: (bone pain/fracture, joint pain or swelling, musle pain)    Integumentary: (rashes, acne, non-healing sores, itching, breast lumps, breast pain, nipple discharge, hair loss)    Neurologic: (HA, muscle weakness, paresthesias (numbness, coldness, crawling or prickling), memory loss, seizure, dizziness)    Endocrine: (heat or cold intolerance, excessive thirst (polydipsia), excessive hunger (polyphagia))    Immune/Allergic: (hives, seasonal or environmental allergies, HIV exposure)    Hematologic/Lymphatic: (lymph node enlargement, easy bleeding or bruising)    History obtained via chart review and patient    PCP is No primary care provider on file. Current Meds:    Prior to Admission medications    Medication Sig Start Date End Date Taking? Authorizing Provider   FLUoxetine (PROZAC) 40 MG capsule Take 2 capsules by mouth daily 12/27/21   Carlos Santos MD   traZODone (DESYREL) 150 MG tablet Take 1 tablet by mouth nightly 12/27/21 1/26/22  Carlos Santos MD   asenapine maleate (SAPHRIS) 2.5 MG SUBL sublingual tablet Place 1 tablet under the tongue 2 times daily as needed (anxiety, agitation) 11/18/21 11/25/21  Carlos Santos MD   vitamin D (ERGOCALCIFEROL) 1.25 MG (12474 UT) CAPS capsule Take 1 capsule by mouth once a week 10/14/21   Brianna Horvath MD       MSE:  Appearance: UTO  Behavior: Calm, cooperative  Speech: Normal in tone, volume, and quality. No slurring, dysarthria or pressured speech noted. Mood: \"Really good\"   Affect: UTO  Thought Process: Appears linear, logical and goal oriented. Causality appears intact. Thought Content: Denies active suicidal and homicidal ideations. No overt delusions or paranoia appreciated. Perceptions: Denies auditory or visual hallucinations at present time. Not responding to internal stimuli. Concentration: Intact. Orientation: to person, place, date, and situation. Language: Intact. Fund of information: Intact. Memory: Recent and remote appear intact. Impulsivity: Limited. Neurovegitative: Fair appetite and sleep. Insight: Fair. Judgment: Fair.       Lab Results   Component Value Date     10/05/2021    K 4.1 10/05/2021     10/05/2021    CO2 24 10/05/2021    BUN 10 10/05/2021    CREATININE 0.9 10/05/2021    GLUCOSE 108 10/05/2021    CALCIUM 8.7 (L) 10/05/2021    PROT 6.1 (L) 10/05/2021    LABALBU 4.1 10/05/2021    BILITOT <0.2 10/05/2021    ALKPHOS 55 10/05/2021    AST 15 10/05/2021    ALT 16 10/05/2021    LABGLOM >60 10/05/2021    GFRAA >59 10/05/2021 Lab Results   Component Value Date     10/05/2021    K 4.1 10/05/2021    K 3.6 02/21/2021     10/05/2021    CO2 24 10/05/2021    BUN 10 10/05/2021    CREATININE 0.9 10/05/2021    GLUCOSE 108 10/05/2021    CALCIUM 8.7 10/05/2021      Lab Results   Component Value Date    CHOL 228 (H) 10/07/2021     Lab Results   Component Value Date    TRIG 167 (H) 10/07/2021     Lab Results   Component Value Date    HDL 53 (L) 10/07/2021     Lab Results   Component Value Date    LDLCALC 142 10/07/2021     No results found for: LABVLDL, VLDL  No results found for: [facility]  Lab Results   Component Value Date    LABA1C 5.7 10/07/2021     No results found for: EAG  Lab Results   Component Value Date    TSHFT4 0.65 02/23/2021     Lab Results   Component Value Date    VITD25 28.9 (L) 10/07/2021       Assessments Administered:      Assessment:   1. Major depressive disorder, recurrent, mild ([facility] [address] [facility]    2. Anxiety    3. Chronic post-traumatic stress disorder (PTSD)        No evidence of acute suicidality, homicidality or psychosis observed. Psychiatrically stable and doing well. Plan:  1. Continue current regimen. The risks, benefits, side effects, indications, contraindications, and adverse effects of the medications have been discussed. Yes.  2. The pt has verbalized understanding and has capacity to give informed consent. 3. The Rosaura Damian report has been reviewed according to [facility] regulations. 4. Supportive therapy offered. We will send a referral for therapy given that our therapist is leaving. 5. Follow up: Return in about 3 months (around 4/12/2022). 6. The patient has been advised to call with any problems. 7. Controlled substance Treatment Plan: NA  8. The above listed medications have been continued, modifications in meds and other orders/labs as follows: No orders of the defined types were placed in this encounter. No orders of the defined types were placed in this encounter.       9. Additional comments: patient will follow up with PCP and neurology        10. Over 50% of the total visit time of 26  minutes was spent on counseling and/or coordination of care of:                        1. Major depressive disorder, recurrent, mild ([facility]    2. Anxiety    3.  Chronic post-traumatic stress disorder (PTSD)            Brie Moore MD medication change been

## 2022-01-19 ENCOUNTER — TELEPHONE (OUTPATIENT)
Dept: PSYCHIATRY | Age: 67
End: 2022-01-19

## 2022-01-19 NOTE — TELEPHONE ENCOUNTER
Pt and Dr Leonel Moore made aware that Saphris  no longer samples this medication. Pt was given the pt assist form in Nov 2021 but has not returned it to the office-pt encouraged to complete and get to the office when she can. Pt said she will have [facility] insurance that starts next week. Pt informed that Dr Leonel Moore will send in a RX so we can find out next week if the insurance will cover it or if a PA will be an option. Pt verbalized understanding. She has samples for now.

## 2022-01-26 RX ORDER — ASENAPINE MALEATE 2.5 MG/1
2.5 TABLET SUBLINGUAL 2 TIMES DAILY PRN
Qty: 60 TABLET | Refills: 1 | Status: CANCELLED | OUTPATIENT
Start: 2022-01-26 | End: 2022-02-25

## 2022-01-26 RX ORDER — ASENAPINE MALEATE 2.5 MG/1
2.5 TABLET SUBLINGUAL 2 TIMES DAILY PRN
Qty: 60 TABLET | Refills: 1 | Status: SHIPPED | OUTPATIENT
Start: 2022-01-26 | End: 2022-04-05

## 2022-01-26 NOTE — TELEPHONE ENCOUNTER
Pharmacy sent a request to refill pt medication. Last office visit : 1/12/2022 Larisa Matthew MD  Next office visit : 5/18/2022 Martina Ravi MD    Requested Prescriptions     Pending Prescriptions Disp Refills    asenapine maleate (SAPHRIS) 2.5 MG SUBL sublingual tablet 60 tablet 1     Sig: Place 1 tablet under the tongue 2 times daily as needed (agitation, severe anxiety)            Timothy Thomas                      1/12/2022  [address] [place] [address] MD    Psychiatry  Major depressive disorder, recurrent, mild ([facility] [address] +2 more    Dx  Medication Check    Reason for Visit       Progress Notes  Paola Dodd MD (Physician) [facility]  Expand All Collapse All  Gracia Car Case is a 73 y.o. female evaluated via telephone on 1/12/2022.       Consent:  She and/or health care decision maker is aware that that she may receive a bill for this telephone service, depending on her insurance coverage, and has provided verbal consent to proceed: Yes        Documentation:  I communicated with the patient and/or health care decision maker about - see below  Details of this discussion including any medical advice provided - see below        I affirm this is a Patient Initiated Episode with a Patient who has not had a related appointment within my department in the past 7 days or scheduled within the next 24 hours.     Patient identification was verified at the start of the visit: Yes     Total Time: minutes: 21-30 minutes     The visit was conducted pursuant to the emergency declaration under the [facility], [address] [place] [address] authority and the [employer] General Act. Patient identification was verified, and a caregiver was present when appropriate.  The patient was located in a state where the provider was credentialed to provide care.     Note: not billable if this call serves to triage the patient into an appointment for the relevant concern        Erika White MD            [facility] Case 1955                               Chief Complaint   Patient presents with    Medication Check            Subjective:       31-year-old white female with history of depression, PTSD and anxiety presents for follow up via telephone. Patient is on Prozac, Trazodone and PRN Saphris. Reports compliance.      Patient is calm and cooperative on the phone. \"Doing well. Both depression and anxiety improved. \". She denies suicidal ideation. Sleeping poorly on some days. Good appetite. Gained some weight. Reconnected with her family and grandchildren. Spending more time with them. Continues with community service. States daughter-in-law will be moving out in Feb. Reports some issues with concentration - feels may be related to a fall a year ago. Will see a neurologist. Report frequent urination and h/o ovarian cyst - will follow up with PCP.     Current Substance Use: Denies alcohol use. Denies drug use.   Denies tobacco use.     BP: LMP  (LMP Unknown)         Review of Systems - 14 point review:  Negative except     Constitutional: (fevers, chills, night sweats, wt loss/gain, change in appetite, fatigue, somnolence)     HEENT: (ear pain or discharge, hearing loss, ear ringing, sinus pressure, nosebleed, nasal discharge, sore throat, oral sores, tooth pain, bleeding gums, hoarse voice, neck pain)      Cardiovascular: (HTN, chest pain, elevated cholesterol/lipids, palpitations, leg swelling, leg pain with walking)     Respiratory: (cough, wheezing, snoring, SOB with activity (dyspnea), SOB while lying flat (orthopnea), awakening with severe SOB (paroxysmal nocturnal dyspnea))     Gastrointestinal: (NVD, constipation, abdominal pain, bright red stools, black tarry stools, stool incontinence)     Genitourinary:  (pelvic pain, burning or frequency of urination, urinary urgency, blood in urine incomplete bladder emptying, urinary incontinence, STD; MEN: testicular pain or swelling, erectile dysfunction; WOMEN: LMP, heavy menstrual bleeding (menorrhagia), irregular periods, postmenopausal bleeding, menstrual pain (dymenorrhea, vaginal discharge)     Musculoskeletal: (bone pain/fracture, joint pain or swelling, musle pain)     Integumentary: (rashes, acne, non-healing sores, itching, breast lumps, breast pain, nipple discharge, hair loss)     Neurologic: (HA, muscle weakness, paresthesias (numbness, coldness, crawling or prickling), memory loss, seizure, dizziness)     Endocrine: (heat or cold intolerance, excessive thirst (polydipsia), excessive hunger (polyphagia))     Immune/Allergic: (hives, seasonal or environmental allergies, HIV exposure)     Hematologic/Lymphatic: (lymph node enlargement, easy bleeding or bruising)     History obtained via chart review and patient     PCP is No primary care provider on file.         Current Meds:     Home Medications           Prior to Admission medications    Medication Sig Start Date End Date Taking? Authorizing Provider   FLUoxetine (PROZAC) 40 MG capsule Take 2 capsules by mouth daily 12/27/21     Pelon Gardner MD   traZODone (DESYREL) 150 MG tablet Take 1 tablet by mouth nightly 12/27/21 1/26/22   Pelon Gardner MD   asenapine maleate (SAPHRIS) 2.5 MG SUBL sublingual tablet Place 1 tablet under the tongue 2 times daily as needed (anxiety, agitation) 11/18/21 11/25/21   Pelon Gardner MD   vitamin D (ERGOCALCIFEROL) 1.25 MG (31517 UT) CAPS capsule Take 1 capsule by mouth once a week 10/14/21     Reji Calvert MD            MSE:  Appearance: UTO  Behavior: Calm, cooperative  Speech: Normal in tone, volume, and quality. No slurring, dysarthria or pressured speech noted. Mood: \"Really good\"   Affect: UTO  Thought Process: Appears linear, logical and goal oriented. Causality appears intact. Thought Content: Denies active suicidal and homicidal ideations. No overt delusions or paranoia appreciated.    Perceptions: Denies auditory or visual hallucinations at present time. Not responding to internal stimuli. Concentration: Intact. Orientation: to person, place, date, and situation. Language: Intact. Fund of information: Intact. Memory: Recent and remote appear intact. Impulsivity: Limited. Neurovegitative: Fair appetite and sleep. Insight: Fair. Judgment: Fair.              Lab Results   Component Value Date      10/05/2021     K 4.1 10/05/2021      10/05/2021     CO2 24 10/05/2021     BUN 10 10/05/2021     CREATININE 0.9 10/05/2021     GLUCOSE 108 10/05/2021     CALCIUM 8.7 (L) 10/05/2021     PROT 6.1 (L) 10/05/2021     LABALBU 4.1 10/05/2021     BILITOT <0.2 10/05/2021     ALKPHOS 55 10/05/2021     AST 15 10/05/2021     ALT 16 10/05/2021     LABGLOM >60 10/05/2021     GFRAA >59 10/05/2021            Lab Results   Component Value Date      10/05/2021     K 4.1 10/05/2021     K 3.6 02/21/2021      10/05/2021     CO2 24 10/05/2021     BUN 10 10/05/2021     CREATININE 0.9 10/05/2021     GLUCOSE 108 10/05/2021     CALCIUM 8.7 10/05/2021            Lab Results   Component Value Date     CHOL 228 (H) 10/07/2021            Lab Results   Component Value Date     TRIG 167 (H) 10/07/2021            Lab Results   Component Value Date     HDL 53 (L) 10/07/2021            Lab Results   Component Value Date     LDLCALC 142 10/07/2021      No results found for: LABVLDL, VLDL  No results found for: [facility]        Lab Results   Component Value Date     LABA1C 5.7 10/07/2021      No results found for: EAG  Lab Results   Component Value Date     TSHFT4 0.65 02/23/2021            Lab Results   Component Value Date     VITD25 28.9 (L) 10/07/2021         Assessments Administered:        Assessment:    1. Major depressive disorder, recurrent, mild ([facility] [address]    2. Anxiety    3. Chronic post-traumatic stress disorder (PTSD)          No evidence of acute suicidality, homicidality or psychosis observed. Psychiatrically stable and doing well.     Plan:  1. Continue current regimen. The risks, benefits, side effects, indications, contraindications, and adverse effects of the medications have been discussed. Yes.  2. The pt has verbalized understanding and has capacity to give informed consent. 3. The Malgorzata Nicolas report has been reviewed according to [facility] regulations. 4. Supportive therapy offered. We will send a referral for therapy given that our therapist is leaving. 5. Follow up:    Return in about 3 months (around 4/12/2022). 6. The patient has been advised to call with any problems. 7. Controlled substance Treatment Plan: NA  8. The above listed medications have been continued, modifications in meds and other orders/labs as follows:                   Encounter Medications    No orders of the defined types were placed in this encounter.                  No orders of the defined types were placed in this encounter.        9. Additional comments: patient will follow up with PCP and neurology           10. Over 50% of the total visit time of 26  minutes was spent on counseling and/or coordination of care of:                         1. Major depressive disorder, recurrent, mild ([facility] [address]    2. Anxiety    3.  Chronic post-traumatic stress disorder (PTSD)                All Fuchs MD

## 2022-02-01 ENCOUNTER — TELEPHONE (OUTPATIENT)
Dept: PSYCHIATRY | Age: 67
End: 2022-02-01

## 2022-02-01 NOTE — TELEPHONE ENCOUNTER
Pt made aware that Dr Bj Warren said she would address her sleep issues tomorrow when she is at the outpt office. Pt agreeable to this plan.

## 2022-02-01 NOTE — TELEPHONE ENCOUNTER
Pt stated that she is having trouble sleeping. She said the current problems sleeping started after being on the Prozac at 80 mg daily for 2 weeks. Pt stated that she is taking the Prozac in the am.  Pt stated she is on Trazodone 150 mg and it was helping until the above occurred. She stated now she will be up at all hrs of the night sometime still awake at 4 am and then wakes up exhausted. She stated she tried taking Trazodone 150 mg 1 1/2 tab at once and then also tried breaking the pill up and taking it in 3 pieces over the course of the night. She said that has helped some but still only getting 3 or 4 hrs of sleep per night. Pt stated she was not having sleep issues on the Prozac 60 mg daily and that the Asenapine is helping her. Pt informed that Dr Brennan Reddy would be given the above info and she would be called back.

## 2022-02-02 ENCOUNTER — TELEPHONE (OUTPATIENT)
Dept: PSYCHIATRY | Age: 67
End: 2022-02-02

## 2022-02-02 NOTE — TELEPHONE ENCOUNTER
Pt sent message through [address] [place] [address] [address] about her concerns about not sleeping well on Prozac 80mg. Pt stated her problems with her sleeping started when her dose of Prozac was change to 80mg. Pt also stated that when she was on 60mg she didn't have any problems sleeping. Discussed this with Dr. Joe Chiu. Per Dr. Joe Chiu called pt back and let her she can go to 60mg on her Prozac.     Electronically signed by Jagruti Cummins on 2/2/2022 at 2:30 PM

## 2022-03-03 ENCOUNTER — TELEPHONE (OUTPATIENT)
Dept: PSYCHIATRY | Age: 67
End: 2022-03-03

## 2022-03-03 NOTE — TELEPHONE ENCOUNTER
Info in phone call with pt today reviewed with Dr Ulices López. Pt informed that Dr Ulices López did not want to make any med changes at this time. Pt informed that the doctor wanted her to get the pt assist form for Saphris completed as soon as possible. Pt agreeable.
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
## 2022-04-04 RX ORDER — FLUOXETINE HYDROCHLORIDE 40 MG/1
80 CAPSULE ORAL DAILY
Qty: 60 CAPSULE | Refills: 3 | Status: SHIPPED | OUTPATIENT
Start: 2022-04-04 | End: 2022-05-06 | Stop reason: SDUPTHER

## 2022-04-04 RX ORDER — TRAZODONE HYDROCHLORIDE 150 MG/1
TABLET ORAL
Qty: 30 TABLET | Refills: 3 | OUTPATIENT
Start: 2022-04-04

## 2022-04-04 NOTE — TELEPHONE ENCOUNTER
Nico Aguero R Case called to request a refill on her medication. Last office visit : 1/12/2022 Tressa Hackett MD  Next office visit : 5/18/2022 Tressa Hackett MD    Requested Prescriptions     Pending Prescriptions Disp Refills    FLUoxetine (PROZAC) 40 MG capsule 60 capsule 3     Sig: Take 2 capsules by mouth daily            Shameca Holt                    1/12/2022  [address] [place] [address], MD    Psychiatry  Major depressive disorder, recurrent, mild ([address] +2 more    Dx  Medication Check    Reason for Visit       Progress Notes  Xiang Kendrick MD (Physician) [facility]  Expand All Collapse All  Jennyfer Mills is a 73 y.o. female evaluated via telephone on 1/12/2022.       Consent:  She and/or health care decision maker is aware that that she may receive a bill for this telephone service, depending on her insurance coverage, and has provided verbal consent to proceed: Yes        Documentation:  I communicated with the patient and/or health care decision maker about - see below  Details of this discussion including any medical advice provided - see below        I affirm this is a Patient Initiated Episode with a Patient who has not had a related appointment within my department in the past 7 days or scheduled within the next 24 hours.     Patient identification was verified at the start of the visit: Yes     Total Time: minutes: 21-30 minutes     The visit was conducted pursuant to the emergency declaration under the [facility], [facility] authority and the [employer] General Act. Patient identification was verified, and a caregiver was present when appropriate.  The patient was located in a state where the provider was credentialed to provide care.     Note: not billable if this call serves to triage the patient into an appointment for the relevant concern        Xiang Kendrick MD            Jennyfer Mills 1955                               Chief Complaint   Patient presents with    Medication Check            Subjective:       36-year-old white female with history of depression, PTSD and anxiety presents for follow up via telephone. Patient is on Prozac, Trazodone and PRN Saphris. Reports compliance.      Patient is calm and cooperative on the phone. \"Doing well. Both depression and anxiety improved. \". She denies suicidal ideation. Sleeping poorly on some days. Good appetite. Gained some weight. Reconnected with her family and grandchildren. Spending more time with them. Continues with community service. States daughter-in-law will be moving out in Feb. Reports some issues with concentration - feels may be related to a fall a year ago. Will see a neurologist. Report frequent urination and h/o ovarian cyst - will follow up with PCP.     Current Substance Use: Denies alcohol use. Denies drug use.   Denies tobacco use.     BP: LMP  (LMP Unknown)         Review of Systems - 14 point review:  Negative except     Constitutional: (fevers, chills, night sweats, wt loss/gain, change in appetite, fatigue, somnolence)     HEENT: (ear pain or discharge, hearing loss, ear ringing, sinus pressure, nosebleed, nasal discharge, sore throat, oral sores, tooth pain, bleeding gums, hoarse voice, neck pain)      Cardiovascular: (HTN, chest pain, elevated cholesterol/lipids, palpitations, leg swelling, leg pain with walking)     Respiratory: (cough, wheezing, snoring, SOB with activity (dyspnea), SOB while lying flat (orthopnea), awakening with severe SOB (paroxysmal nocturnal dyspnea))     Gastrointestinal: (NVD, constipation, abdominal pain, bright red stools, black tarry stools, stool incontinence)     Genitourinary:  (pelvic pain, burning or frequency of urination, urinary urgency, blood in urine incomplete bladder emptying, urinary incontinence, STD; MEN: testicular pain or swelling, erectile dysfunction; WOMEN: LMP, heavy menstrual bleeding (menorrhagia), irregular periods, postmenopausal bleeding, menstrual pain (dymenorrhea, vaginal discharge)     Musculoskeletal: (bone pain/fracture, joint pain or swelling, musle pain)     Integumentary: (rashes, acne, non-healing sores, itching, breast lumps, breast pain, nipple discharge, hair loss)     Neurologic: (HA, muscle weakness, paresthesias (numbness, coldness, crawling or prickling), memory loss, seizure, dizziness)     Endocrine: (heat or cold intolerance, excessive thirst (polydipsia), excessive hunger (polyphagia))     Immune/Allergic: (hives, seasonal or environmental allergies, HIV exposure)     Hematologic/Lymphatic: (lymph node enlargement, easy bleeding or bruising)     History obtained via chart review and patient     PCP is No primary care provider on file.         Current Meds:     Home Medications           Prior to Admission medications    Medication Sig Start Date End Date Taking? Authorizing Provider   FLUoxetine (PROZAC) 40 MG capsule Take 2 capsules by mouth daily 12/27/21     Alma Delia Nunes MD   traZODone (DESYREL) 150 MG tablet Take 1 tablet by mouth nightly 12/27/21 1/26/22   Alma Delia Nunes MD   asenapine maleate (SAPHRIS) 2.5 MG SUBL sublingual tablet Place 1 tablet under the tongue 2 times daily as needed (anxiety, agitation) 11/18/21 11/25/21   Alma Delia Nunes MD   vitamin D (ERGOCALCIFEROL) 1.25 MG (61322 UT) CAPS capsule Take 1 capsule by mouth once a week 10/14/21     Sophia Cardona MD            MSE:  Appearance: UTO  Behavior: Calm, cooperative  Speech: Normal in tone, volume, and quality. No slurring, dysarthria or pressured speech noted. Mood: \"Really good\"   Affect: UTO  Thought Process: Appears linear, logical and goal oriented. Causality appears intact. Thought Content: Denies active suicidal and homicidal ideations. No overt delusions or paranoia appreciated. Perceptions: Denies auditory or visual hallucinations at present time.  Not responding to internal stimuli. Concentration: Intact. Orientation: to person, place, date, and situation. Language: Intact. Fund of information: Intact. Memory: Recent and remote appear intact. Impulsivity: Limited. Neurovegitative: Fair appetite and sleep. Insight: Fair. Judgment: Fair.              Lab Results   Component Value Date      10/05/2021     K 4.1 10/05/2021      10/05/2021     CO2 24 10/05/2021     BUN 10 10/05/2021     CREATININE 0.9 10/05/2021     GLUCOSE 108 10/05/2021     CALCIUM 8.7 (L) 10/05/2021     PROT 6.1 (L) 10/05/2021     LABALBU 4.1 10/05/2021     BILITOT <0.2 10/05/2021     ALKPHOS 55 10/05/2021     AST 15 10/05/2021     ALT 16 10/05/2021     LABGLOM >60 10/05/2021     GFRAA >59 10/05/2021            Lab Results   Component Value Date      10/05/2021     K 4.1 10/05/2021     K 3.6 02/21/2021      10/05/2021     CO2 24 10/05/2021     BUN 10 10/05/2021     CREATININE 0.9 10/05/2021     GLUCOSE 108 10/05/2021     CALCIUM 8.7 10/05/2021            Lab Results   Component Value Date     CHOL 228 (H) 10/07/2021            Lab Results   Component Value Date     TRIG 167 (H) 10/07/2021            Lab Results   Component Value Date     HDL 53 (L) 10/07/2021            Lab Results   Component Value Date     LDLCALC 142 10/07/2021      No results found for: LABVLDL, VLDL  No results found for: [facility]        Lab Results   Component Value Date     LABA1C 5.7 10/07/2021      No results found for: EAG  Lab Results   Component Value Date     TSHFT4 0.65 02/23/2021            Lab Results   Component Value Date     VITD25 28.9 (L) 10/07/2021         Assessments Administered:        Assessment:    1. Major depressive disorder, recurrent, mild ([address]    2. Anxiety    3. Chronic post-traumatic stress disorder (PTSD)          No evidence of acute suicidality, homicidality or psychosis observed. Psychiatrically stable and doing well.     Plan:  1.   Continue current regimen. The risks, benefits, side effects, indications, contraindications, and adverse effects of the medications have been discussed. Yes.  2. The pt has verbalized understanding and has capacity to give informed consent. 3. The Yi Pacheco report has been reviewed according to [facility] regulations. 4. Supportive therapy offered. We will send a referral for therapy given that our therapist is leaving. 5. Follow up:    Return in about 3 months (around 4/12/2022). 6. The patient has been advised to call with any problems. 7. Controlled substance Treatment Plan: NA  8. The above listed medications have been continued, modifications in meds and other orders/labs as follows:                   Encounter Medications    No orders of the defined types were placed in this encounter.                  No orders of the defined types were placed in this encounter.        9. Additional comments: patient will follow up with PCP and neurology           10. Over 50% of the total visit time of 26  minutes was spent on counseling and/or coordination of care of:                         1. Major depressive disorder, recurrent, mild ([facility] [address]    2. Anxiety    3.  Chronic post-traumatic stress disorder (PTSD)                Devan Macdonald MD

## 2022-04-05 RX ORDER — ASENAPINE MALEATE 2.5 MG/1
2.5 TABLET SUBLINGUAL 2 TIMES DAILY PRN
Qty: 60 TABLET | Refills: 1 | Status: SHIPPED | OUTPATIENT
Start: 2022-04-05 | End: 2022-05-18 | Stop reason: ALTCHOICE

## 2022-04-05 NOTE — TELEPHONE ENCOUNTER
Pharmacy sent med refill request below. Last office visit : 1/12/2022 with Dr Cy Singh  Next office visit : 5/18/2022 with Dr Cy Singh    Requested Prescriptions     Pending Prescriptions Disp Refills    asenapine maleate (SAPHRIS) 2.5 MG SUBL sublingual tablet [Pharmacy Med Name: ASENAPINE 2.5 MG TABLET SL] 60 tablet 1     Sig: PLACE 1 TABLET UNDER THE TONGUE 2 TIMES DAILY AS NEEDED (AGITATION, SEVERE ANXIETY)            Mary Schmidt RN        Virtual Visit    1/12/2022  [address] Psychiatry Abdullahi Pereira MD    Psychiatry  Major depressive disorder, recurrent, mild ([facility] [address] +2 more    Dx  Medication Check    Reason for Visit       Progress Notes  Page Simon MD (Physician) [facility]  Expand All Collapse All  [facility] Dino Case is a 73 y.o. female evaluated via telephone on 1/12/2022.       Consent:  She and/or health care decision maker is aware that that she may receive a bill for this telephone service, depending on her insurance coverage, and has provided verbal consent to proceed: Yes        Documentation:  I communicated with the patient and/or health care decision maker about - see below  Details of this discussion including any medical advice provided - see below        I affirm this is a Patient Initiated Episode with a Patient who has not had a related appointment within my department in the past 7 days or scheduled within the next 24 hours.     Patient identification was verified at the start of the visit: Yes     Total Time: minutes: 21-30 minutes     The visit was conducted pursuant to the emergency declaration under the [facility], [address] [place] [address] authority and the [employer] General Act. Patient identification was verified, and a caregiver was present when appropriate.  The patient was located in a state where the provider was credentialed to provide care.     Note: not billable if this call serves to triage the patient into an appointment for the relevant concern        MD Melanie Rice Case 1955                               Chief Complaint   Patient presents with    Medication Check            Subjective:       70-year-old white female with history of depression, PTSD and anxiety presents for follow up via telephone. Patient is on Prozac, Trazodone and PRN Saphris. Reports compliance.      Patient is calm and cooperative on the phone. \"Doing well. Both depression and anxiety improved. \". She denies suicidal ideation. Sleeping poorly on some days. Good appetite. Gained some weight. Reconnected with her family and grandchildren. Spending more time with them. Continues with community service. States daughter-in-law will be moving out in Feb. Reports some issues with concentration - feels may be related to a fall a year ago. Will see a neurologist. Report frequent urination and h/o ovarian cyst - will follow up with PCP.     Current Substance Use: Denies alcohol use. Denies drug use.   Denies tobacco use.     BP: LMP  (LMP Unknown)         Review of Systems - 14 point review:  Negative except     Constitutional: (fevers, chills, night sweats, wt loss/gain, change in appetite, fatigue, somnolence)     HEENT: (ear pain or discharge, hearing loss, ear ringing, sinus pressure, nosebleed, nasal discharge, sore throat, oral sores, tooth pain, bleeding gums, hoarse voice, neck pain)      Cardiovascular: (HTN, chest pain, elevated cholesterol/lipids, palpitations, leg swelling, leg pain with walking)     Respiratory: (cough, wheezing, snoring, SOB with activity (dyspnea), SOB while lying flat (orthopnea), awakening with severe SOB (paroxysmal nocturnal dyspnea))     Gastrointestinal: (NVD, constipation, abdominal pain, bright red stools, black tarry stools, stool incontinence)     Genitourinary:  (pelvic pain, burning or frequency of urination, urinary urgency, blood in urine incomplete bladder emptying, urinary incontinence, STD; MEN: testicular pain or swelling, erectile dysfunction; WOMEN: LMP, heavy menstrual bleeding (menorrhagia), irregular periods, postmenopausal bleeding, menstrual pain (dymenorrhea, vaginal discharge)     Musculoskeletal: (bone pain/fracture, joint pain or swelling, musle pain)     Integumentary: (rashes, acne, non-healing sores, itching, breast lumps, breast pain, nipple discharge, hair loss)     Neurologic: (HA, muscle weakness, paresthesias (numbness, coldness, crawling or prickling), memory loss, seizure, dizziness)     Endocrine: (heat or cold intolerance, excessive thirst (polydipsia), excessive hunger (polyphagia))     Immune/Allergic: (hives, seasonal or environmental allergies, HIV exposure)     Hematologic/Lymphatic: (lymph node enlargement, easy bleeding or bruising)     History obtained via chart review and patient     PCP is No primary care provider on file.         Current Meds:     Home Medications           Prior to Admission medications    Medication Sig Start Date End Date Taking? Authorizing Provider   FLUoxetine (PROZAC) 40 MG capsule Take 2 capsules by mouth daily 12/27/21     Abbie Hwang MD   traZODone (DESYREL) 150 MG tablet Take 1 tablet by mouth nightly 12/27/21 1/26/22   Abbie Hwang MD   asenapine maleate (SAPHRIS) 2.5 MG SUBL sublingual tablet Place 1 tablet under the tongue 2 times daily as needed (anxiety, agitation) 11/18/21 11/25/21   Abbie Hwang MD   vitamin D (ERGOCALCIFEROL) 1.25 MG (74744 UT) CAPS capsule Take 1 capsule by mouth once a week 10/14/21     Kanu Gutierrez MD            MSE:  Appearance: UTO  Behavior: Calm, cooperative  Speech: Normal in tone, volume, and quality. No slurring, dysarthria or pressured speech noted. Mood: \"Really good\"   Affect: UTO  Thought Process: Appears linear, logical and goal oriented. Causality appears intact. Thought Content: Denies active suicidal and homicidal ideations.  No overt delusions or paranoia appreciated. Perceptions: Denies auditory or visual hallucinations at present time. Not responding to internal stimuli. Concentration: Intact. Orientation: to person, place, date, and situation. Language: Intact. Fund of information: Intact. Memory: Recent and remote appear intact. Impulsivity: Limited. Neurovegitative: Fair appetite and sleep. Insight: Fair. Judgment: Fair.              Lab Results   Component Value Date      10/05/2021     K 4.1 10/05/2021      10/05/2021     CO2 24 10/05/2021     BUN 10 10/05/2021     CREATININE 0.9 10/05/2021     GLUCOSE 108 10/05/2021     CALCIUM 8.7 (L) 10/05/2021     PROT 6.1 (L) 10/05/2021     LABALBU 4.1 10/05/2021     BILITOT <0.2 10/05/2021     ALKPHOS 55 10/05/2021     AST 15 10/05/2021     ALT 16 10/05/2021     LABGLOM >60 10/05/2021     GFRAA >59 10/05/2021            Lab Results   Component Value Date      10/05/2021     K 4.1 10/05/2021     K 3.6 02/21/2021      10/05/2021     CO2 24 10/05/2021     BUN 10 10/05/2021     CREATININE 0.9 10/05/2021     GLUCOSE 108 10/05/2021     CALCIUM 8.7 10/05/2021            Lab Results   Component Value Date     CHOL 228 (H) 10/07/2021            Lab Results   Component Value Date     TRIG 167 (H) 10/07/2021            Lab Results   Component Value Date     HDL 53 (L) 10/07/2021            Lab Results   Component Value Date     LDLCALC 142 10/07/2021      No results found for: LABVLDL, VLDL  No results found for: [facility]        Lab Results   Component Value Date     LABA1C 5.7 10/07/2021      No results found for: EAG  Lab Results   Component Value Date     TSHFT4 0.65 02/23/2021            Lab Results   Component Value Date     VITD25 28.9 (L) 10/07/2021         Assessments Administered:        Assessment:    1. Major depressive disorder, recurrent, mild ([facility] [address]    2. Anxiety    3.  Chronic post-traumatic stress disorder (PTSD)          No evidence of acute suicidality, homicidality or psychosis observed. Psychiatrically stable and doing well.     Plan:  1. Continue current regimen. The risks, benefits, side effects, indications, contraindications, and adverse effects of the medications have been discussed. Yes.  2. The pt has verbalized understanding and has capacity to give informed consent. 3. The Keturah Uribe report has been reviewed according to [facility] regulations. 4. Supportive therapy offered. We will send a referral for therapy given that our therapist is leaving. 5. Follow up:    Return in about 3 months (around 4/12/2022). 6. The patient has been advised to call with any problems. 7. Controlled substance Treatment Plan: NA  8. The above listed medications have been continued, modifications in meds and other orders/labs as follows:                   Encounter Medications    No orders of the defined types were placed in this encounter.                  No orders of the defined types were placed in this encounter.        9. Additional comments: patient will follow up with PCP and neurology           10. Over 50% of the total visit time of 26  minutes was spent on counseling and/or coordination of care of:                         1. Major depressive disorder, recurrent, mild ([facility] [address]    2. Anxiety    3.  Chronic post-traumatic stress disorder (PTSD)                Xiang Kendrick MD medication change been

## 2022-04-15 RX ORDER — TRAZODONE HYDROCHLORIDE 150 MG/1
150 TABLET ORAL NIGHTLY
Qty: 90 TABLET | Refills: 0 | Status: SHIPPED | OUTPATIENT
Start: 2022-04-15 | End: 2022-07-14

## 2022-04-15 NOTE — TELEPHONE ENCOUNTER
[facility] sent fax requesting 90 day RX for the med below. Last office visit : 1/12/2022 with Dr Iker Lopez  Next office visit : 5/18/2022 with Dr Iker Lopez    Requested Prescriptions     Pending Prescriptions Disp Refills    traZODone (DESYREL) 150 MG tablet 90 tablet 0     Sig: Take 1 tablet by mouth nightly            Xavier Shaw, RN      Virtual Visit    1/12/2022  [address] [facility]   Ann Lindquist MD    Psychiatry  Major depressive disorder, recurrent, mild ([address] +2 more    Dx  Medication Check    Reason for Visit       Progress Notes  Ann Lindquist MD (Physician) [facility]  Expand All Collapse All  Yuko Lorenzo Case is a 73 y.o. female evaluated via telephone on 1/12/2022.       Consent:  She and/or health care decision maker is aware that that she may receive a bill for this telephone service, depending on her insurance coverage, and has provided verbal consent to proceed: Yes        Documentation:  I communicated with the patient and/or health care decision maker about - see below  Details of this discussion including any medical advice provided - see below        I affirm this is a Patient Initiated Episode with a Patient who has not had a related appointment within my department in the past 7 days or scheduled within the next 24 hours.     Patient identification was verified at the start of the visit: Yes     Total Time: minutes: 21-30 minutes     The visit was conducted pursuant to the emergency declaration under the [facility] [place], [address] [place] [address] authority and the [employer] and [employer] General Act. Patient identification was verified, and a caregiver was present when appropriate.  The patient was located in a state where the provider was credentialed to provide care.     Note: not billable if this call serves to triage the patient into an appointment for the relevant concern        MD Yuko Guaman Case [id number]                               Chief Complaint   Patient presents with    Medication Check            Subjective:       71-year-old white female with history of depression, PTSD and anxiety presents for follow up via telephone. Patient is on Prozac, Trazodone and PRN Saphris. Reports compliance.      Patient is calm and cooperative on the phone. \"Doing well. Both depression and anxiety improved. \". She denies suicidal ideation. Sleeping poorly on some days. Good appetite. Gained some weight. Reconnected with her family and grandchildren. Spending more time with them. Continues with community service. States daughter-in-law will be moving out in Feb. Reports some issues with concentration - feels may be related to a fall a year ago. Will see a neurologist. Report frequent urination and h/o ovarian cyst - will follow up with PCP.     Current Substance Use: Denies alcohol use. Denies drug use.   Denies tobacco use.     BP: LMP  (LMP Unknown)         Review of Systems - 14 point review:  Negative except     Constitutional: (fevers, chills, night sweats, wt loss/gain, change in appetite, fatigue, somnolence)     HEENT: (ear pain or discharge, hearing loss, ear ringing, sinus pressure, nosebleed, nasal discharge, sore throat, oral sores, tooth pain, bleeding gums, hoarse voice, neck pain)      Cardiovascular: (HTN, chest pain, elevated cholesterol/lipids, palpitations, leg swelling, leg pain with walking)     Respiratory: (cough, wheezing, snoring, SOB with activity (dyspnea), SOB while lying flat (orthopnea), awakening with severe SOB (paroxysmal nocturnal dyspnea))     Gastrointestinal: (NVD, constipation, abdominal pain, bright red stools, black tarry stools, stool incontinence)     Genitourinary:  (pelvic pain, burning or frequency of urination, urinary urgency, blood in urine incomplete bladder emptying, urinary incontinence, STD; MEN: testicular pain or swelling, erectile dysfunction; WOMEN: LMP, heavy menstrual bleeding (menorrhagia), irregular periods, postmenopausal bleeding, menstrual pain (dymenorrhea, vaginal discharge)     Musculoskeletal: (bone pain/fracture, joint pain or swelling, musle pain)     Integumentary: (rashes, acne, non-healing sores, itching, breast lumps, breast pain, nipple discharge, hair loss)     Neurologic: (HA, muscle weakness, paresthesias (numbness, coldness, crawling or prickling), memory loss, seizure, dizziness)     Endocrine: (heat or cold intolerance, excessive thirst (polydipsia), excessive hunger (polyphagia))     Immune/Allergic: (hives, seasonal or environmental allergies, HIV exposure)     Hematologic/Lymphatic: (lymph node enlargement, easy bleeding or bruising)     History obtained via chart review and patient     PCP is No primary care provider on file.         Current Meds:     Home Medications           Prior to Admission medications    Medication Sig Start Date End Date Taking? Authorizing Provider   FLUoxetine (PROZAC) 40 MG capsule Take 2 capsules by mouth daily 12/27/21     Michelle Merritt MD   traZODone (DESYREL) 150 MG tablet Take 1 tablet by mouth nightly 12/27/21 1/26/22   Michelle Merritt MD   asenapine maleate (SAPHRIS) 2.5 MG SUBL sublingual tablet Place 1 tablet under the tongue 2 times daily as needed (anxiety, agitation) 11/18/21 11/25/21   Michelle Merritt MD   vitamin D (ERGOCALCIFEROL) 1.25 MG (90364 UT) CAPS capsule Take 1 capsule by mouth once a week 10/14/21     Breann Renteria MD            MSE:  Appearance: UTO  Behavior: Calm, cooperative  Speech: Normal in tone, volume, and quality. No slurring, dysarthria or pressured speech noted. Mood: \"Really good\"   Affect: UTO  Thought Process: Appears linear, logical and goal oriented. Causality appears intact. Thought Content: Denies active suicidal and homicidal ideations. No overt delusions or paranoia appreciated. Perceptions: Denies auditory or visual hallucinations at present time.  Not responding to internal stimuli. Concentration: Intact. Orientation: to person, place, date, and situation. Language: Intact. Fund of information: Intact. Memory: Recent and remote appear intact. Impulsivity: Limited. Neurovegitative: Fair appetite and sleep. Insight: Fair. Judgment: Fair.              Lab Results   Component Value Date      10/05/2021     K 4.1 10/05/2021      10/05/2021     CO2 24 10/05/2021     BUN 10 10/05/2021     CREATININE 0.9 10/05/2021     GLUCOSE 108 10/05/2021     CALCIUM 8.7 (L) 10/05/2021     PROT 6.1 (L) 10/05/2021     LABALBU 4.1 10/05/2021     BILITOT <0.2 10/05/2021     ALKPHOS 55 10/05/2021     AST 15 10/05/2021     ALT 16 10/05/2021     LABGLOM >60 10/05/2021     GFRAA >59 10/05/2021            Lab Results   Component Value Date      10/05/2021     K 4.1 10/05/2021     K 3.6 02/21/2021      10/05/2021     CO2 24 10/05/2021     BUN 10 10/05/2021     CREATININE 0.9 10/05/2021     GLUCOSE 108 10/05/2021     CALCIUM 8.7 10/05/2021            Lab Results   Component Value Date     CHOL 228 (H) 10/07/2021            Lab Results   Component Value Date     TRIG 167 (H) 10/07/2021            Lab Results   Component Value Date     HDL 53 (L) 10/07/2021            Lab Results   Component Value Date     LDLCALC 142 10/07/2021      No results found for: LABVLDL, VLDL  No results found for: [facility]        Lab Results   Component Value Date     LABA1C 5.7 10/07/2021      No results found for: EAG  Lab Results   Component Value Date     TSHFT4 0.65 02/23/2021            Lab Results   Component Value Date     VITD25 28.9 (L) 10/07/2021         Assessments Administered:        Assessment:    1. Major depressive disorder, recurrent, mild ([address]    2. Anxiety    3. Chronic post-traumatic stress disorder (PTSD)          No evidence of acute suicidality, homicidality or psychosis observed. Psychiatrically stable and doing well.     Plan:  1.   Continue current regimen. The risks, benefits, side effects, indications, contraindications, and adverse effects of the medications have been discussed. Yes.  2. The pt has verbalized understanding and has capacity to give informed consent. 3. The [facility] report has been reviewed according to [facility] regulations. 4. Supportive therapy offered. We will send a referral for therapy given that our therapist is leaving. 5. Follow up:    Return in about 3 months (around 4/12/2022). 6. The patient has been advised to call with any problems. 7. Controlled substance Treatment Plan: NA  8. The above listed medications have been continued, modifications in meds and other orders/labs as follows:                   Encounter Medications    No orders of the defined types were placed in this encounter.                  No orders of the defined types were placed in this encounter.        9. Additional comments: patient will follow up with PCP and neurology           10. Over 50% of the total visit time of 26  minutes was spent on counseling and/or coordination of care of:                         1. Major depressive disorder, recurrent, mild ([address]    2. Anxiety    3.  Chronic post-traumatic stress disorder (PTSD)                Gustavo Zimmer MD medication change been

## 2022-05-06 RX ORDER — FLUOXETINE HYDROCHLORIDE 40 MG/1
80 CAPSULE ORAL DAILY
Qty: 60 CAPSULE | Refills: 3 | Status: SHIPPED | OUTPATIENT
Start: 2022-05-06 | End: 2022-05-18 | Stop reason: SDUPTHER

## 2022-05-06 NOTE — TELEPHONE ENCOUNTER
Pharmacy sent a request to refill pt's medication. Last office visit : 1/12/2022 Jojo Perez MD  Next office visit : 5/18/2022 Jojo Perez MD    Requested Prescriptions     Pending Prescriptions Disp Refills    FLUoxetine (PROZAC) 40 MG capsule 60 capsule 3     Sig: Take 2 capsules by mouth daily            Shameca Carlisle            1/12/2022  [address], [place]    Psychiatry  Major depressive disorder, recurrent, mild ([address] +2 more    Dx  Medication Check    Reason for Visit       Progress Notes  Jerome Alcaraz MD (Physician) [facility]  Expand All Collapse All  Carley Ford Case is a 73 y.o. female evaluated via telephone on 1/12/2022.       Consent:  She and/or health care decision maker is aware that that she may receive a bill for this telephone service, depending on her insurance coverage, and has provided verbal consent to proceed: Yes        Documentation:  I communicated with the patient and/or health care decision maker about - see below  Details of this discussion including any medical advice provided - see below        I affirm this is a Patient Initiated Episode with a Patient who has not had a related appointment within my department in the past 7 days or scheduled within the next 24 hours.     Patient identification was verified at the start of the visit: Yes     Total Time: minutes: 21-30 minutes     The visit was conducted pursuant to the emergency declaration under the [facility], [facility] authority and the [employer] General Act. Patient identification was verified, and a caregiver was present when appropriate.  The patient was located in a state where the provider was credentialed to provide care.     Note: not billable if this call serves to triage the patient into an appointment for the relevant concern        Jerome Alcaraz MD            Carley Ford Case 1955        Chief Complaint   Patient presents with    Medication Check            Subjective:       28-year-old white female with history of depression, PTSD and anxiety presents for follow up via telephone. Patient is on Prozac, Trazodone and PRN Saphris. Reports compliance.      Patient is calm and cooperative on the phone. \"Doing well. Both depression and anxiety improved. \". She denies suicidal ideation. Sleeping poorly on some days. Good appetite. Gained some weight. Reconnected with her family and grandchildren. Spending more time with them. Continues with community service. States daughter-in-law will be moving out in Feb. Reports some issues with concentration - feels may be related to a fall a year ago. Will see a neurologist. Report frequent urination and h/o ovarian cyst - will follow up with PCP.     Current Substance Use: Denies alcohol use. Denies drug use.   Denies tobacco use.     BP: LMP  (LMP Unknown)         Review of Systems - 14 point review:  Negative except     Constitutional: (fevers, chills, night sweats, wt loss/gain, change in appetite, fatigue, somnolence)     HEENT: (ear pain or discharge, hearing loss, ear ringing, sinus pressure, nosebleed, nasal discharge, sore throat, oral sores, tooth pain, bleeding gums, hoarse voice, neck pain)      Cardiovascular: (HTN, chest pain, elevated cholesterol/lipids, palpitations, leg swelling, leg pain with walking)     Respiratory: (cough, wheezing, snoring, SOB with activity (dyspnea), SOB while lying flat (orthopnea), awakening with severe SOB (paroxysmal nocturnal dyspnea))     Gastrointestinal: (NVD, constipation, abdominal pain, bright red stools, black tarry stools, stool incontinence)     Genitourinary:  (pelvic pain, burning or frequency of urination, urinary urgency, blood in urine incomplete bladder emptying, urinary incontinence, STD; MEN: testicular pain or swelling, erectile dysfunction; WOMEN: LMP, heavy menstrual bleeding (menorrhagia), irregular periods, postmenopausal bleeding, menstrual pain (dymenorrhea, vaginal discharge)     Musculoskeletal: (bone pain/fracture, joint pain or swelling, musle pain)     Integumentary: (rashes, acne, non-healing sores, itching, breast lumps, breast pain, nipple discharge, hair loss)     Neurologic: (HA, muscle weakness, paresthesias (numbness, coldness, crawling or prickling), memory loss, seizure, dizziness)     Endocrine: (heat or cold intolerance, excessive thirst (polydipsia), excessive hunger (polyphagia))     Immune/Allergic: (hives, seasonal or environmental allergies, HIV exposure)     Hematologic/Lymphatic: (lymph node enlargement, easy bleeding or bruising)     History obtained via chart review and patient     PCP is No primary care provider on file.         Current Meds:     Home Medications           Prior to Admission medications    Medication Sig Start Date End Date Taking? Authorizing Provider   FLUoxetine (PROZAC) 40 MG capsule Take 2 capsules by mouth daily 12/27/21     Liza Martinez MD   traZODone (DESYREL) 150 MG tablet Take 1 tablet by mouth nightly 12/27/21 1/26/22   Liza Martinez MD   asenapine maleate (SAPHRIS) 2.5 MG SUBL sublingual tablet Place 1 tablet under the tongue 2 times daily as needed (anxiety, agitation) 11/18/21 11/25/21   Liza Martinez MD   vitamin D (ERGOCALCIFEROL) 1.25 MG (76742 UT) CAPS capsule Take 1 capsule by mouth once a week 10/14/21     Michel Orr MD            MSE:  Appearance: UTO  Behavior: Calm, cooperative  Speech: Normal in tone, volume, and quality. No slurring, dysarthria or pressured speech noted. Mood: \"Really good\"   Affect: UTO  Thought Process: Appears linear, logical and goal oriented. Causality appears intact. Thought Content: Denies active suicidal and homicidal ideations. No overt delusions or paranoia appreciated. Perceptions: Denies auditory or visual hallucinations at present time.  Not responding to internal stimuli. Concentration: Intact. Orientation: to person, place, date, and situation. Language: Intact. Fund of information: Intact. Memory: Recent and remote appear intact. Impulsivity: Limited. Neurovegitative: Fair appetite and sleep. Insight: Fair. Judgment: Fair.              Lab Results   Component Value Date      10/05/2021     K 4.1 10/05/2021      10/05/2021     CO2 24 10/05/2021     BUN 10 10/05/2021     CREATININE 0.9 10/05/2021     GLUCOSE 108 10/05/2021     CALCIUM 8.7 (L) 10/05/2021     PROT 6.1 (L) 10/05/2021     LABALBU 4.1 10/05/2021     BILITOT <0.2 10/05/2021     ALKPHOS 55 10/05/2021     AST 15 10/05/2021     ALT 16 10/05/2021     LABGLOM >60 10/05/2021     GFRAA >59 10/05/2021            Lab Results   Component Value Date      10/05/2021     K 4.1 10/05/2021     K 3.6 02/21/2021      10/05/2021     CO2 24 10/05/2021     BUN 10 10/05/2021     CREATININE 0.9 10/05/2021     GLUCOSE 108 10/05/2021     CALCIUM 8.7 10/05/2021            Lab Results   Component Value Date     CHOL 228 (H) 10/07/2021            Lab Results   Component Value Date     TRIG 167 (H) 10/07/2021            Lab Results   Component Value Date     HDL 53 (L) 10/07/2021            Lab Results   Component Value Date     LDLCALC 142 10/07/2021      No results found for: LABVLDL, VLDL  No results found for: [facility]        Lab Results   Component Value Date     LABA1C 5.7 10/07/2021      No results found for: EAG  Lab Results   Component Value Date     TSHFT4 0.65 02/23/2021            Lab Results   Component Value Date     VITD25 28.9 (L) 10/07/2021         Assessments Administered:        Assessment:    1. Major depressive disorder, recurrent, mild ([address]    2. Anxiety    3. Chronic post-traumatic stress disorder (PTSD)          No evidence of acute suicidality, homicidality or psychosis observed. Psychiatrically stable and doing well.     Plan:  1. Continue current regimen.   The risks, benefits, side effects, indications, contraindications, and adverse effects of the medications have been discussed. Yes.  2. The pt has verbalized understanding and has capacity to give informed consent. 3. The [employer] report has been reviewed according to [facility] regulations. 4. Supportive therapy offered. We will send a referral for therapy given that our therapist is leaving. 5. Follow up:    Return in about 3 months (around 4/12/2022). 6. The patient has been advised to call with any problems. 7. Controlled substance Treatment Plan: NA  8. The above listed medications have been continued, modifications in meds and other orders/labs as follows:                   Encounter Medications    No orders of the defined types were placed in this encounter.                  No orders of the defined types were placed in this encounter.        9. Additional comments: patient will follow up with PCP and neurology           10. Over 50% of the total visit time of 26  minutes was spent on counseling and/or coordination of care of:                         1. Major depressive disorder, recurrent, mild ([facility]    2. Anxiety    3.  Chronic post-traumatic stress disorder (PTSD)                Mary Rivero MD

## 2022-05-17 ENCOUNTER — TELEPHONE (OUTPATIENT)
Dept: PSYCHIATRY | Age: 67
End: 2022-05-17

## 2022-05-18 ENCOUNTER — OFFICE VISIT (OUTPATIENT)
Dept: PSYCHIATRY | Age: 67
End: 2022-05-18
Payer: MEDICARE

## 2022-05-18 VITALS
OXYGEN SATURATION: 95 % | BODY MASS INDEX: 22.91 KG/M2 | WEIGHT: 137.5 LBS | HEART RATE: 88 BPM | TEMPERATURE: 98.3 F | HEIGHT: 65 IN | SYSTOLIC BLOOD PRESSURE: 102 MMHG | DIASTOLIC BLOOD PRESSURE: 65 MMHG

## 2022-05-18 DIAGNOSIS — F33.0 MAJOR DEPRESSIVE DISORDER, RECURRENT, MILD (HCC): Primary | ICD-10-CM

## 2022-05-18 DIAGNOSIS — F41.9 ANXIETY: ICD-10-CM

## 2022-05-18 PROCEDURE — 99214 OFFICE O/P EST MOD 30 MIN: CPT | Performed by: PSYCHIATRY & NEUROLOGY

## 2022-05-18 RX ORDER — FLUOXETINE HYDROCHLORIDE 40 MG/1
40 CAPSULE ORAL DAILY
Qty: 30 CAPSULE | Refills: 3 | Status: SHIPPED | OUTPATIENT
Start: 2022-05-18 | End: 2022-06-17 | Stop reason: SDUPTHER

## 2022-05-18 RX ORDER — FLUOXETINE HYDROCHLORIDE 20 MG/1
20 CAPSULE ORAL DAILY
Qty: 30 CAPSULE | Refills: 3 | Status: SHIPPED | OUTPATIENT
Start: 2022-05-18 | End: 2022-06-09 | Stop reason: SDUPTHER

## 2022-05-18 ASSESSMENT — PATIENT HEALTH QUESTIONNAIRE - PHQ9
1. LITTLE INTEREST OR PLEASURE IN DOING THINGS: 3
3. TROUBLE FALLING OR STAYING ASLEEP: 2
7. TROUBLE CONCENTRATING ON THINGS, SUCH AS READING THE NEWSPAPER OR WATCHING TELEVISION: 3
4. FEELING TIRED OR HAVING LITTLE ENERGY: 3
SUM OF ALL RESPONSES TO PHQ QUESTIONS 1-9: 16
5. POOR APPETITE OR OVEREATING: 0
10. IF YOU CHECKED OFF ANY PROBLEMS, HOW DIFFICULT HAVE THESE PROBLEMS MADE IT FOR YOU TO DO YOUR WORK, TAKE CARE OF THINGS AT HOME, OR GET ALONG WITH OTHER PEOPLE: 1
SUM OF ALL RESPONSES TO PHQ QUESTIONS 1-9: 16
9. THOUGHTS THAT YOU WOULD BE BETTER OFF DEAD, OR OF HURTING YOURSELF: 0
6. FEELING BAD ABOUT YOURSELF - OR THAT YOU ARE A FAILURE OR HAVE LET YOURSELF OR YOUR FAMILY DOWN: 3
SUM OF ALL RESPONSES TO PHQ9 QUESTIONS 1 & 2: 4
SUM OF ALL RESPONSES TO PHQ QUESTIONS 1-9: 16
2. FEELING DOWN, DEPRESSED OR HOPELESS: 1
SUM OF ALL RESPONSES TO PHQ QUESTIONS 1-9: 16
8. MOVING OR SPEAKING SO SLOWLY THAT OTHER PEOPLE COULD HAVE NOTICED. OR THE OPPOSITE, BEING SO FIGETY OR RESTLESS THAT YOU HAVE BEEN MOVING AROUND A LOT MORE THAN USUAL: 1

## 2022-05-18 NOTE — PROGRESS NOTES
Ben Kramer Case is a 73 y.o. female evaluated via telephone on 5/18/2022. Ben Kramer Case 1955      Chief Complaint   Patient presents with    Medication Check         Subjective:      78-year-old white female with history of depression, PTSD and anxiety presents for follow. Patient is on Prozac, Trazodone. Reports compliance. Patient is calm and cooperative on the phone. Smiling. \"Doing good. Very calm\". She denies suicidal ideation. Sleeping well. Good appetite. Spending time with her family and grandchildren. Continues with community service. Still drug testing, on probation. States daughter-in-law will be moving out in June. Reports some issues with concentration. Staying sober. Her cat gave birth. Spending a lot of time with pets. Current Substance Use: Denies alcohol use. Denies drug use. Denies tobacco use.     BP: /65   Pulse 88   Temp 98.3 °F (36.8 °C)   Ht 5' 5\" (1.651 m)   Wt 137 lb 8 oz (62.4 kg)   LMP  (LMP Unknown)   SpO2 95%   BMI 22.88 kg/m²       Review of Systems - 14 point review:  Negative except    Constitutional: (fevers, chills, night sweats, wt loss/gain, change in appetite, fatigue, somnolence)    HEENT: (ear pain or discharge, hearing loss, ear ringing, sinus pressure, nosebleed, nasal discharge, sore throat, oral sores, tooth pain, bleeding gums, hoarse voice, neck pain)      Cardiovascular: (HTN, chest pain, elevated cholesterol/lipids, palpitations, leg swelling, leg pain with walking)    Respiratory: (cough, wheezing, snoring, SOB with activity (dyspnea), SOB while lying flat (orthopnea), awakening with severe SOB (paroxysmal nocturnal dyspnea))    Gastrointestinal: (NVD, constipation, abdominal pain, bright red stools, black tarry stools, stool incontinence)     Genitourinary:  (pelvic pain, burning or frequency of urination, urinary urgency, blood in urine incomplete bladder emptying, urinary incontinence, STD; MEN: testicular pain or swelling, erectile dysfunction; WOMEN: LMP, heavy menstrual bleeding (menorrhagia), irregular periods, postmenopausal bleeding, menstrual pain (dymenorrhea, vaginal discharge)    Musculoskeletal: (bone pain/fracture, joint pain or swelling, musle pain)    Integumentary: (rashes, acne, non-healing sores, itching, breast lumps, breast pain, nipple discharge, hair loss)    Neurologic: (HA, muscle weakness, paresthesias (numbness, coldness, crawling or prickling), memory loss, seizure, dizziness)    Endocrine: (heat or cold intolerance, excessive thirst (polydipsia), excessive hunger (polyphagia))    Immune/Allergic: (hives, seasonal or environmental allergies, HIV exposure)    Hematologic/Lymphatic: (lymph node enlargement, easy bleeding or bruising)    History obtained via chart review and patient    PCP is No primary care provider on file. Current Meds:    Prior to Admission medications    Medication Sig Start Date End Date Taking? Authorizing Provider   FLUoxetine (PROZAC) 40 MG capsule Take 1 capsule by mouth daily 5/18/22 6/17/22 Yes Irineo Mark MD   FLUoxetine (PROZAC) 20 MG capsule Take 1 capsule by mouth daily 5/18/22  Yes Irineo Mark MD   traZODone (DESYREL) 150 MG tablet Take 1 tablet by mouth nightly 4/15/22 7/14/22  Irineo Mark MD   vitamin D (ERGOCALCIFEROL) 1.25 MG (28118 UT) CAPS capsule Take 1 capsule by mouth once a week 10/14/21   Maye Dandy, MD       MSE:  Appearance: Casually dressed  Behavior: Calm, cooperative  Speech: Normal in tone, volume, and quality. No slurring, dysarthria or pressured speech noted. Mood: \"Good\"   Affect: Bright  Thought Process: Appears linear, logical and goal oriented. Causality appears intact. Thought Content: Denies active suicidal and homicidal ideations. No overt delusions or paranoia appreciated. Perceptions: Denies auditory or visual hallucinations at present time. Not responding to internal stimuli. Concentration: Intact.    Orientation: to person, place, date, and situation. Language: Intact. Fund of information: Intact. Memory: Recent and remote appear intact. Impulsivity: Limited. Neurovegitative: Fair appetite and sleep. Insight: Fair. Judgment: Fair. Lab Results   Component Value Date     10/05/2021    K 4.1 10/05/2021     10/05/2021    CO2 24 10/05/2021    BUN 10 10/05/2021    CREATININE 0.9 10/05/2021    GLUCOSE 108 10/05/2021    CALCIUM 8.7 (L) 10/05/2021    PROT 6.1 (L) 10/05/2021    LABALBU 4.1 10/05/2021    BILITOT <0.2 10/05/2021    ALKPHOS 55 10/05/2021    AST 15 10/05/2021    ALT 16 10/05/2021    LABGLOM >60 10/05/2021    GFRAA >59 10/05/2021     Lab Results   Component Value Date     10/05/2021    K 4.1 10/05/2021    K 3.6 02/21/2021     10/05/2021    CO2 24 10/05/2021    BUN 10 10/05/2021    CREATININE 0.9 10/05/2021    GLUCOSE 108 10/05/2021    CALCIUM 8.7 10/05/2021      Lab Results   Component Value Date    CHOL 228 (H) 10/07/2021     Lab Results   Component Value Date    TRIG 167 (H) 10/07/2021     Lab Results   Component Value Date    HDL 53 (L) 10/07/2021     Lab Results   Component Value Date    LDLCALC 142 10/07/2021     No results found for: LABVLDL, VLDL  No results found for: [facility]  Lab Results   Component Value Date    LABA1C 5.7 10/07/2021     No results found for: EAG  Lab Results   Component Value Date    TSHFT4 0.65 02/23/2021     Lab Results   Component Value Date    VITD25 28.9 (L) 10/07/2021           Assessment:   1. Major depressive disorder, recurrent, mild ([facility] [address]    2. Anxiety        No evidence of acute suicidality, homicidality or psychosis observed. Psychiatrically stable and doing well. Plan:  1. Continue current regimen. The risks, benefits, side effects, indications, contraindications, and adverse effects of the medications have been discussed. Yes.  2. The pt has verbalized understanding and has capacity to give informed consent.   3. The [facility] report has been reviewed according to [facility] regulations. 4. Supportive therapy offered. 5. Follow up: Return in about 4 months (around 9/18/2022). 6. The patient has been advised to call with any problems. 7. Controlled substance Treatment Plan: NA  8. The above listed medications have been continued, modifications in meds and other orders/labs as follows:      Orders Placed This Encounter   Medications    FLUoxetine (PROZAC) 40 MG capsule     Sig: Take 1 capsule by mouth daily     Dispense:  30 capsule     Refill:  3    FLUoxetine (PROZAC) 20 MG capsule     Sig: Take 1 capsule by mouth daily     Dispense:  30 capsule     Refill:  3      No orders of the defined types were placed in this encounter. 9. Additional comments:         10.Over 50% of the total visit time of 30  minutes was spent on counseling and/or coordination of care of:                        1. Major depressive disorder, recurrent, mild ([facility] [address]    2.  Chrissy Chen MD medication change been

## 2022-06-09 RX ORDER — FLUOXETINE HYDROCHLORIDE 20 MG/1
20 CAPSULE ORAL DAILY
Qty: 30 CAPSULE | Refills: 3 | Status: SHIPPED | OUTPATIENT
Start: 2022-06-09

## 2022-06-09 NOTE — TELEPHONE ENCOUNTER
Pharmacy sent a request to refill pt's medication. Last office visit : 5/18/2022 Tila Land MD  Next office visit : 9/20/2022 Tila Land MD    Requested Prescriptions     Pending Prescriptions Disp Refills    FLUoxetine (PROZAC) 20 MG capsule 30 capsule 3     Sig: Take 1 capsule by mouth daily            Timothy Thomas          5/18/2022  [address], [place]    Psychiatry  Major depressive disorder, recurrent, mild ([address] +1 more    Dx  Medication Check    Reason for Visit       Progress Notes  Hubert Mendes MD (Physician) [facility]  Expand All Collapse All  Oneita Lewis Case is a 73 y.o. female evaluated via telephone on 5/18/2022.             Oneita Lewis Case 1955                               Chief Complaint   Patient presents with    Medication Check            Subjective:       78-year-old white female with history of depression, PTSD and anxiety presents for follow. Patient is on Prozac, Trazodone. Reports compliance.      Patient is calm and cooperative on the phone. Smiling. \"Doing good. Very calm\". She denies suicidal ideation. Sleeping well. Good appetite. Spending time with her family and grandchildren. Continues with community service. Still drug testing, on probation. States daughter-in-law will be moving out in June. Reports some issues with concentration. Staying sober. Her cat gave birth. Spending a lot of time with pets.      Current Substance Use: Denies alcohol use. Denies drug use.   Denies tobacco use.     BP: /65   Pulse 88   Temp 98.3 °F (36.8 °C)   Ht 5' 5\" (1.651 m)   Wt 137 lb 8 oz (62.4 kg)   LMP  (LMP Unknown)   SpO2 95%   BMI 22.88 kg/m²         Review of Systems - 14 point review:  Negative except     Constitutional: (fevers, chills, night sweats, wt loss/gain, change in appetite, fatigue, somnolence)     HEENT: (ear pain or discharge, hearing loss, ear ringing, sinus pressure, nosebleed, nasal discharge, sore throat, oral sores, tooth pain, bleeding gums, hoarse voice, neck pain)      Cardiovascular: (HTN, chest pain, elevated cholesterol/lipids, palpitations, leg swelling, leg pain with walking)     Respiratory: (cough, wheezing, snoring, SOB with activity (dyspnea), SOB while lying flat (orthopnea), awakening with severe SOB (paroxysmal nocturnal dyspnea))     Gastrointestinal: (NVD, constipation, abdominal pain, bright red stools, black tarry stools, stool incontinence)     Genitourinary:  (pelvic pain, burning or frequency of urination, urinary urgency, blood in urine incomplete bladder emptying, urinary incontinence, STD; MEN: testicular pain or swelling, erectile dysfunction; WOMEN: LMP, heavy menstrual bleeding (menorrhagia), irregular periods, postmenopausal bleeding, menstrual pain (dymenorrhea, vaginal discharge)     Musculoskeletal: (bone pain/fracture, joint pain or swelling, musle pain)     Integumentary: (rashes, acne, non-healing sores, itching, breast lumps, breast pain, nipple discharge, hair loss)     Neurologic: (HA, muscle weakness, paresthesias (numbness, coldness, crawling or prickling), memory loss, seizure, dizziness)     Endocrine: (heat or cold intolerance, excessive thirst (polydipsia), excessive hunger (polyphagia))     Immune/Allergic: (hives, seasonal or environmental allergies, HIV exposure)     Hematologic/Lymphatic: (lymph node enlargement, easy bleeding or bruising)     History obtained via chart review and patient     PCP is No primary care provider on file.         Current Meds:     Home Medications           Prior to Admission medications    Medication Sig Start Date End Date Taking?  Authorizing Provider   FLUoxetine (PROZAC) 40 MG capsule Take 1 capsule by mouth daily 5/18/22 6/17/22 Yes Jerome Alcaraz MD   FLUoxetine (PROZAC) 20 MG capsule Take 1 capsule by mouth daily 5/18/22   Yes Jerome Alcaraz MD   traZODone (DESYREL) 150 MG tablet Take 1 tablet by mouth nightly 4/15/22 7/14/22   Nicolle Pedro Weir MD   vitamin D (ERGOCALCIFEROL) 1.25 MG (59204 UT) CAPS capsule Take 1 capsule by mouth once a week 10/14/21     Osman Álvarez MD            MSE:  Appearance: Casually dressed  Behavior: Calm, cooperative  Speech: Normal in tone, volume, and quality. No slurring, dysarthria or pressured speech noted. Mood: \"Good\"   Affect: Bright  Thought Process: Appears linear, logical and goal oriented. Causality appears intact. Thought Content: Denies active suicidal and homicidal ideations. No overt delusions or paranoia appreciated. Perceptions: Denies auditory or visual hallucinations at present time. Not responding to internal stimuli. Concentration: Intact. Orientation: to person, place, date, and situation. Language: Intact. Fund of information: Intact. Memory: Recent and remote appear intact. Impulsivity: Limited. Neurovegitative: Fair appetite and sleep. Insight: Fair.    Judgment: Fair.              Lab Results   Component Value Date      10/05/2021     K 4.1 10/05/2021      10/05/2021     CO2 24 10/05/2021     BUN 10 10/05/2021     CREATININE 0.9 10/05/2021     GLUCOSE 108 10/05/2021     CALCIUM 8.7 (L) 10/05/2021     PROT 6.1 (L) 10/05/2021     LABALBU 4.1 10/05/2021     BILITOT <0.2 10/05/2021     ALKPHOS 55 10/05/2021     AST 15 10/05/2021     ALT 16 10/05/2021     LABGLOM >60 10/05/2021     GFRAA >59 10/05/2021            Lab Results   Component Value Date      10/05/2021     K 4.1 10/05/2021     K 3.6 02/21/2021      10/05/2021     CO2 24 10/05/2021     BUN 10 10/05/2021     CREATININE 0.9 10/05/2021     GLUCOSE 108 10/05/2021     CALCIUM 8.7 10/05/2021            Lab Results   Component Value Date     CHOL 228 (H) 10/07/2021            Lab Results   Component Value Date     TRIG 167 (H) 10/07/2021            Lab Results   Component Value Date     HDL 53 (L) 10/07/2021            Lab Results   Component Value Date     LDLCALC 142 10/07/2021      No results found for: LABVLDL, VLDL  No results found for: [facility]        Lab Results   Component Value Date     LABA1C 5.7 10/07/2021      No results found for: EAG  Lab Results   Component Value Date     TSHFT4 0.65 02/23/2021            Lab Results   Component Value Date     VITD25 28.9 (L) 10/07/2021               Assessment:    1. Major depressive disorder, recurrent, mild ([facility] [address]    2. Anxiety          No evidence of acute suicidality, homicidality or psychosis observed. Psychiatrically stable and doing well.     Plan:  1. Continue current regimen. The risks, benefits, side effects, indications, contraindications, and adverse effects of the medications have been discussed. Yes.  2. The pt has verbalized understanding and has capacity to give informed consent. 3. The [facility] report has been reviewed according to [facility] regulations. 4. Supportive therapy offered. 5. Follow up:    Return in about 4 months (around 9/18/2022). 6. The patient has been advised to call with any problems. 7. Controlled substance Treatment Plan: NA  8. The above listed medications have been continued, modifications in meds and other orders/labs as follows:                 Encounter Medications         Orders Placed This Encounter   Medications    FLUoxetine (PROZAC) 40 MG capsule       Sig: Take 1 capsule by mouth daily       Dispense:  30 capsule       Refill:  3    FLUoxetine (PROZAC) 20 MG capsule       Sig: Take 1 capsule by mouth daily       Dispense:  30 capsule       Refill:  3                     No orders of the defined types were placed in this encounter.        9. Additional comments:            10. Over 50% of the total visit time of 30  minutes was spent on counseling and/or coordination of care of:                         1. Major depressive disorder, recurrent, mild ([facility] [address]    2.  Anxiety                Viri Gillis MD

## 2022-06-13 RX ORDER — ASENAPINE MALEATE 2.5 MG/1
2.5 TABLET SUBLINGUAL 2 TIMES DAILY PRN
Qty: 60 TABLET | Refills: 1 | OUTPATIENT
Start: 2022-06-13 | End: 2022-07-13

## 2022-06-17 RX ORDER — FLUOXETINE HYDROCHLORIDE 40 MG/1
40 CAPSULE ORAL DAILY
Qty: 30 CAPSULE | Refills: 3 | Status: SHIPPED | OUTPATIENT
Start: 2022-06-17 | End: 2022-07-17

## 2022-06-17 NOTE — TELEPHONE ENCOUNTER
Pharmacy sent a request to refill pt's medication. Last office visit : 5/18/2022 Gissel Aponte MD  Next office visit : 9/20/2022 Gissel Aponte MD    Requested Prescriptions     Pending Prescriptions Disp Refills    FLUoxetine (PROZAC) 40 MG capsule 30 capsule 3     Sig: Take 1 capsule by mouth daily            Napolean Osmond Case 1955                               Chief Complaint   Patient presents with    Medication Check            Subjective:       80-year-old white female with history of depression, PTSD and anxiety presents for follow. Patient is on Prozac, Trazodone. Reports compliance.      Patient is calm and cooperative on the phone. Smiling. \"Doing good. Very calm\". She denies suicidal ideation. Sleeping well. Good appetite. Spending time with her family and grandchildren. Continues with community service. Still drug testing, on probation. States daughter-in-law will be moving out in June. Reports some issues with concentration. Staying sober. Her cat gave birth. Spending a lot of time with pets.      Current Substance Use: Denies alcohol use. Denies drug use.   Denies tobacco use.     BP: /65   Pulse 88   Temp 98.3 °F (36.8 °C)   Ht 5' 5\" (1.651 m)   Wt 137 lb 8 oz (62.4 kg)   LMP  (LMP Unknown)   SpO2 95%   BMI 22.88 kg/m²         Review of Systems - 14 point review:  Negative except     Constitutional: (fevers, chills, night sweats, wt loss/gain, change in appetite, fatigue, somnolence)     HEENT: (ear pain or discharge, hearing loss, ear ringing, sinus pressure, nosebleed, nasal discharge, sore throat, oral sores, tooth pain, bleeding gums, hoarse voice, neck pain)      Cardiovascular: (HTN, chest pain, elevated cholesterol/lipids, palpitations, leg swelling, leg pain with walking)     Respiratory: (cough, wheezing, snoring, SOB with activity (dyspnea), SOB while lying flat (orthopnea), awakening with severe SOB (paroxysmal nocturnal dyspnea))     Gastrointestinal: (NVD, constipation, abdominal pain, bright red stools, black tarry stools, stool incontinence)     Genitourinary:  (pelvic pain, burning or frequency of urination, urinary urgency, blood in urine incomplete bladder emptying, urinary incontinence, STD; MEN: testicular pain or swelling, erectile dysfunction; WOMEN: LMP, heavy menstrual bleeding (menorrhagia), irregular periods, postmenopausal bleeding, menstrual pain (dymenorrhea, vaginal discharge)     Musculoskeletal: (bone pain/fracture, joint pain or swelling, musle pain)     Integumentary: (rashes, acne, non-healing sores, itching, breast lumps, breast pain, nipple discharge, hair loss)     Neurologic: (HA, muscle weakness, paresthesias (numbness, coldness, crawling or prickling), memory loss, seizure, dizziness)     Endocrine: (heat or cold intolerance, excessive thirst (polydipsia), excessive hunger (polyphagia))     Immune/Allergic: (hives, seasonal or environmental allergies, HIV exposure)     Hematologic/Lymphatic: (lymph node enlargement, easy bleeding or bruising)     History obtained via chart review and patient     PCP is No primary care provider on file.         Current Meds:     Home Medications           Prior to Admission medications    Medication Sig Start Date End Date Taking? Authorizing Provider   FLUoxetine (PROZAC) 40 MG capsule Take 1 capsule by mouth daily 5/18/22 6/17/22 Yes Tessa Lynn MD   FLUoxetine (PROZAC) 20 MG capsule Take 1 capsule by mouth daily 5/18/22   Yes Tessa Lynn MD   traZODone (DESYREL) 150 MG tablet Take 1 tablet by mouth nightly 4/15/22 7/14/22   Tessa Lynn MD   vitamin D (ERGOCALCIFEROL) 1.25 MG (20144 UT) CAPS capsule Take 1 capsule by mouth once a week 10/14/21     Stacie Pardo MD            MSE:  Appearance: Casually dressed  Behavior: Calm, cooperative  Speech: Normal in tone, volume, and quality. No slurring, dysarthria or pressured speech noted.    Mood: \"Good\"   Affect: Bright  Thought Process: Appears linear, logical and goal oriented. Causality appears intact. Thought Content: Denies active suicidal and homicidal ideations. No overt delusions or paranoia appreciated. Perceptions: Denies auditory or visual hallucinations at present time. Not responding to internal stimuli. Concentration: Intact. Orientation: to person, place, date, and situation. Language: Intact. Fund of information: Intact. Memory: Recent and remote appear intact. Impulsivity: Limited. Neurovegitative: Fair appetite and sleep. Insight: Fair. Judgment: Fair.              Lab Results   Component Value Date      10/05/2021     K 4.1 10/05/2021      10/05/2021     CO2 24 10/05/2021     BUN 10 10/05/2021     CREATININE 0.9 10/05/2021     GLUCOSE 108 10/05/2021     CALCIUM 8.7 (L) 10/05/2021     PROT 6.1 (L) 10/05/2021     LABALBU 4.1 10/05/2021     BILITOT <0.2 10/05/2021     ALKPHOS 55 10/05/2021     AST 15 10/05/2021     ALT 16 10/05/2021     LABGLOM >60 10/05/2021     GFRAA >59 10/05/2021            Lab Results   Component Value Date      10/05/2021     K 4.1 10/05/2021     K 3.6 02/21/2021      10/05/2021     CO2 24 10/05/2021     BUN 10 10/05/2021     CREATININE 0.9 10/05/2021     GLUCOSE 108 10/05/2021     CALCIUM 8.7 10/05/2021            Lab Results   Component Value Date     CHOL 228 (H) 10/07/2021            Lab Results   Component Value Date     TRIG 167 (H) 10/07/2021            Lab Results   Component Value Date     HDL 53 (L) 10/07/2021            Lab Results   Component Value Date     LDLCALC 142 10/07/2021      No results found for: LABVLDL, VLDL  No results found for: [facility]        Lab Results   Component Value Date     LABA1C 5.7 10/07/2021      No results found for: EAG        Lab Results   Component Value Date     TSHFT4 0.65 02/23/2021            Lab Results   Component Value Date     VITD25 28.9 (L) 10/07/2021               Assessment:    1.  Major depressive disorder, recurrent, mild ([address]    2. Anxiety          No evidence of acute suicidality, homicidality or psychosis observed. Psychiatrically stable and doing well.     Plan:  1. Continue current regimen. The risks, benefits, side effects, indications, contraindications, and adverse effects of the medications have been discussed. Yes.  2. The pt has verbalized understanding and has capacity to give informed consent. 3. The [facility] report has been reviewed according to [facility] regulations. 4. Supportive therapy offered. 5. Follow up:    Return in about 4 months (around 9/18/2022). 6. The patient has been advised to call with any problems. 7. Controlled substance Treatment Plan: NA  8. The above listed medications have been continued, modifications in meds and other orders/labs as follows:                 Encounter Medications         Orders Placed This Encounter   Medications    FLUoxetine (PROZAC) 40 MG capsule       Sig: Take 1 capsule by mouth daily       Dispense:  30 capsule       Refill:  3    FLUoxetine (PROZAC) 20 MG capsule       Sig: Take 1 capsule by mouth daily       Dispense:  30 capsule       Refill:  3                     No orders of the defined types were placed in this encounter.        9. Additional comments:            10. Over 50% of the total visit time of 30  minutes was spent on counseling and/or coordination of care of:                         1. Major depressive disorder, recurrent, mild ([address]    2.  Anxiety                Rob Ospina MD

## 2022-06-30 ENCOUNTER — TELEPHONE (OUTPATIENT)
Dept: PSYCHIATRY | Age: 67
End: 2022-06-30

## 2022-06-30 NOTE — TELEPHONE ENCOUNTER
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 Contacted pt and she stated that she is taking the med.   She stated that she had some left from when the total dose was decreased in the past.

## 2022-07-14 ENCOUNTER — TELEPHONE (OUTPATIENT)
Dept: PSYCHIATRY | Age: 67
End: 2022-07-14

## 2022-07-14 RX ORDER — ASENAPINE MALEATE 2.5 MG/1
2.5 TABLET SUBLINGUAL 2 TIMES DAILY PRN
Qty: 60 TABLET | Refills: 0 | Status: SHIPPED | OUTPATIENT
Start: 2022-07-14 | End: 2022-08-23

## 2022-07-14 RX ORDER — TRAZODONE HYDROCHLORIDE 150 MG/1
150 TABLET ORAL NIGHTLY
Qty: 90 TABLET | Refills: 0 | Status: SHIPPED | OUTPATIENT
Start: 2022-07-14 | End: 2022-10-10

## 2022-07-14 NOTE — PROGRESS NOTES
Spoke with Dr. Peyton Almonte, she is in agreement with patient in restarting saphris 2.5 mg daily twice daily as needed for anxiety

## 2022-07-14 NOTE — TELEPHONE ENCOUNTER
pharmacy sent a request to refill pt's  medication. Last office visit : 5/18/2022 Yumiko Perez MD  Next office visit : 9/20/2022 Yumiko Perez MD    Requested Prescriptions     Pending Prescriptions Disp Refills    traZODone (DESYREL) 150 MG tablet [Pharmacy Med Name: TRAZODONE 150 MG TABLET] 90 tablet 0     Sig: TAKE 1 TABLET BY MOUTH NIGHTLY            Timothy Potter Case is a 73 y.o. female evaluated via telephone on 5/18/2022.             Zahira Potter Case 1955                               Chief Complaint   Patient presents with    Medication Check            Subjective:       81-year-old white female with history of depression, PTSD and anxiety presents for follow. Patient is on Prozac, Trazodone. Reports compliance.      Patient is calm and cooperative on the phone. Smiling. \"Doing good. Very calm\". She denies suicidal ideation. Sleeping well. Good appetite. Spending time with her family and grandchildren. Continues with community service. Still drug testing, on probation. States daughter-in-law will be moving out in June. Reports some issues with concentration. Staying sober. Her cat gave birth. Spending a lot of time with pets.      Current Substance Use: Denies alcohol use. Denies drug use.   Denies tobacco use.     BP: /65   Pulse 88   Temp 98.3 °F (36.8 °C)   Ht 5' 5\" (1.651 m)   Wt 137 lb 8 oz (62.4 kg)   LMP  (LMP Unknown)   SpO2 95%   BMI 22.88 kg/m²         Review of Systems - 14 point review:  Negative except     Constitutional: (fevers, chills, night sweats, wt loss/gain, change in appetite, fatigue, somnolence)     HEENT: (ear pain or discharge, hearing loss, ear ringing, sinus pressure, nosebleed, nasal discharge, sore throat, oral sores, tooth pain, bleeding gums, hoarse voice, neck pain)      Cardiovascular: (HTN, chest pain, elevated cholesterol/lipids, palpitations, leg swelling, leg pain with walking)     Respiratory: (cough, wheezing, snoring, SOB with activity (dyspnea), SOB while lying flat (orthopnea), awakening with severe SOB (paroxysmal nocturnal dyspnea))     Gastrointestinal: (NVD, constipation, abdominal pain, bright red stools, black tarry stools, stool incontinence)     Genitourinary:  (pelvic pain, burning or frequency of urination, urinary urgency, blood in urine incomplete bladder emptying, urinary incontinence, STD; MEN: testicular pain or swelling, erectile dysfunction; WOMEN: LMP, heavy menstrual bleeding (menorrhagia), irregular periods, postmenopausal bleeding, menstrual pain (dymenorrhea, vaginal discharge)     Musculoskeletal: (bone pain/fracture, joint pain or swelling, musle pain)     Integumentary: (rashes, acne, non-healing sores, itching, breast lumps, breast pain, nipple discharge, hair loss)     Neurologic: (HA, muscle weakness, paresthesias (numbness, coldness, crawling or prickling), memory loss, seizure, dizziness)     Endocrine: (heat or cold intolerance, excessive thirst (polydipsia), excessive hunger (polyphagia))     Immune/Allergic: (hives, seasonal or environmental allergies, HIV exposure)     Hematologic/Lymphatic: (lymph node enlargement, easy bleeding or bruising)     History obtained via chart review and patient     PCP is No primary care provider on file.         Current Meds:     Home Medications           Prior to Admission medications    Medication Sig Start Date End Date Taking?  Authorizing Provider   FLUoxetine (PROZAC) 40 MG capsule Take 1 capsule by mouth daily 5/18/22 6/17/22 Yes Alanis Gaspar MD   FLUoxetine (PROZAC) 20 MG capsule Take 1 capsule by mouth daily 5/18/22   Yes Alanis Gaspar MD   traZODone (DESYREL) 150 MG tablet Take 1 tablet by mouth nightly 4/15/22 7/14/22   Alanis Gaspar MD   vitamin D (ERGOCALCIFEROL) 1.25 MG (43964 UT) CAPS capsule Take 1 capsule by mouth once a week 10/14/21     Phoenix Camara MD            MSE:  Appearance: Casually dressed  Behavior: Calm, cooperative  Speech: Normal in tone, volume, and quality. No slurring, dysarthria or pressured speech noted. Mood: \"Good\"   Affect: Bright  Thought Process: Appears linear, logical and goal oriented. Causality appears intact. Thought Content: Denies active suicidal and homicidal ideations. No overt delusions or paranoia appreciated. Perceptions: Denies auditory or visual hallucinations at present time. Not responding to internal stimuli. Concentration: Intact. Orientation: to person, place, date, and situation. Language: Intact. Fund of information: Intact. Memory: Recent and remote appear intact. Impulsivity: Limited. Neurovegitative: Fair appetite and sleep. Insight: Fair.    Judgment: Fair.        Lab Results   Component Value Date      10/05/2021     K 4.1 10/05/2021      10/05/2021     CO2 24 10/05/2021     BUN 10 10/05/2021     CREATININE 0.9 10/05/2021     GLUCOSE 108 10/05/2021     CALCIUM 8.7 (L) 10/05/2021     PROT 6.1 (L) 10/05/2021     LABALBU 4.1 10/05/2021     BILITOT <0.2 10/05/2021     ALKPHOS 55 10/05/2021     AST 15 10/05/2021     ALT 16 10/05/2021     LABGLOM >60 10/05/2021     GFRAA >59 10/05/2021            Lab Results   Component Value Date      10/05/2021     K 4.1 10/05/2021     K 3.6 02/21/2021      10/05/2021     CO2 24 10/05/2021     BUN 10 10/05/2021     CREATININE 0.9 10/05/2021     GLUCOSE 108 10/05/2021     CALCIUM 8.7 10/05/2021            Lab Results   Component Value Date     CHOL 228 (H) 10/07/2021            Lab Results   Component Value Date     TRIG 167 (H) 10/07/2021            Lab Results   Component Value Date     HDL 53 (L) 10/07/2021            Lab Results   Component Value Date     LDLCALC 142 10/07/2021      No results found for: LABVLDL, VLDL  No results found for: [facility]        Lab Results   Component Value Date     LABA1C 5.7 10/07/2021      No results found for: EAG        Lab Results   Component Value Date     TSHFT4 0.65 02/23/2021     Lab Results   Component Value Date     VITD25 28.9 (L) 10/07/2021               Assessment:    1. Major depressive disorder, recurrent, mild ([address]    2. Anxiety          No evidence of acute suicidality, homicidality or psychosis observed. Psychiatrically stable and doing well.     Plan:  1. Continue current regimen. The risks, benefits, side effects, indications, contraindications, and adverse effects of the medications have been discussed. Yes.  2. The pt has verbalized understanding and has capacity to give informed consent. 3. The Afsaneh Favor report has been reviewed according to [facility] regulations. 4. Supportive therapy offered. 5. Follow up:    Return in about 4 months (around 9/18/2022). 6. The patient has been advised to call with any problems. 7. Controlled substance Treatment Plan: NA  8. The above listed medications have been continued, modifications in meds and other orders/labs as follows:                 Encounter Medications         Orders Placed This Encounter   Medications    FLUoxetine (PROZAC) 40 MG capsule       Sig: Take 1 capsule by mouth daily       Dispense:  30 capsule       Refill:  3    FLUoxetine (PROZAC) 20 MG capsule       Sig: Take 1 capsule by mouth daily       Dispense:  30 capsule       Refill:  3                     No orders of the defined types were placed in this encounter.        9. Additional comments:            10. Over 50% of the total visit time of 30  minutes was spent on counseling and/or coordination of care of:                         1. Major depressive disorder, recurrent, mild ([facility] [address]    2.  Anxiety                Capri Day MD

## 2022-08-23 RX ORDER — ASENAPINE MALEATE 2.5 MG/1
2.5 TABLET SUBLINGUAL 2 TIMES DAILY PRN
Qty: 45 TABLET | Refills: 2 | Status: SHIPPED | OUTPATIENT
Start: 2022-08-23 | End: 2022-09-08 | Stop reason: SDUPTHER

## 2022-08-23 NOTE — TELEPHONE ENCOUNTER
Pharmacy sent a request to refill pt's medication. Last office visit : 5/18/2022 Danielle Lazaro MD  Next office visit : 9/20/2022 Danielle Lazaro MD    Requested Prescriptions     Pending Prescriptions Disp Refills    asenapine maleate (SAPHRIS) 2.5 MG SUBL sublingual tablet [Pharmacy Med Name: ASENAPINE 2.5 MG TABLET SL] 60 tablet 0     Sig: PLACE 1 TABLET UNDER THE TONGUE 2 TIMES DAILY AS NEEDED (ANXIETY)            Timothy Thomas            5/18/2022  [address] [facility]  Alphonse Lizarraga MD  Psychiatry Major depressive disorder, recurrent, mild ([facility] [address] +1 more  Dx Medication Check  Reason for Visit     Progress Notes  Alphonse Lizarraga MD (Physician)   Psychiatry  Expand All Collapse All  Elvia Astorga Case is a 73 y.o. female evaluated via telephone on 5/18/2022. Elvia Astorga Case 1955                               Chief Complaint   Patient presents with    Medication Check            Subjective:       36-year-old white female with history of depression, PTSD and anxiety presents for follow. Patient is on Prozac, Trazodone. Reports compliance. Patient is calm and cooperative on the phone. Smiling. \"Doing good. Very calm\". She denies suicidal ideation. Sleeping well. Good appetite. Spending time with her family and grandchildren. Continues with community service. Still drug testing, on probation. States daughter-in-law will be moving out in June. Reports some issues with concentration. Staying sober. Her cat gave birth. Spending a lot of time with pets. Current Substance Use: Denies alcohol use. Denies drug use. Denies tobacco use.      BP: /65   Pulse 88   Temp 98.3 °F (36.8 °C)   Ht 5' 5\" (1.651 m)   Wt 137 lb 8 oz (62.4 kg)   LMP  (LMP Unknown)   SpO2 95%   BMI 22.88 kg/m²         Review of Systems - 14 point review:  Negative except     Constitutional: (fevers, chills, night sweats, wt loss/gain, change in appetite, fatigue, somnolence)     HEENT: (ear pain or discharge, hearing loss, ear ringing, sinus pressure, nosebleed, nasal discharge, sore throat, oral sores, tooth pain, bleeding gums, hoarse voice, neck pain)      Cardiovascular: (HTN, chest pain, elevated cholesterol/lipids, palpitations, leg swelling, leg pain with walking)     Respiratory: (cough, wheezing, snoring, SOB with activity (dyspnea), SOB while lying flat (orthopnea), awakening with severe SOB (paroxysmal nocturnal dyspnea))     Gastrointestinal: (NVD, constipation, abdominal pain, bright red stools, black tarry stools, stool incontinence)     Genitourinary:  (pelvic pain, burning or frequency of urination, urinary urgency, blood in urine incomplete bladder emptying, urinary incontinence, STD; MEN: testicular pain or swelling, erectile dysfunction; WOMEN: LMP, heavy menstrual bleeding (menorrhagia), irregular periods, postmenopausal bleeding, menstrual pain (dymenorrhea, vaginal discharge)     Musculoskeletal: (bone pain/fracture, joint pain or swelling, musle pain)     Integumentary: (rashes, acne, non-healing sores, itching, breast lumps, breast pain, nipple discharge, hair loss)     Neurologic: (HA, muscle weakness, paresthesias (numbness, coldness, crawling or prickling), memory loss, seizure, dizziness)     Endocrine: (heat or cold intolerance, excessive thirst (polydipsia), excessive hunger (polyphagia))     Immune/Allergic: (hives, seasonal or environmental allergies, HIV exposure)     Hematologic/Lymphatic: (lymph node enlargement, easy bleeding or bruising)     History obtained via chart review and patient     PCP is No primary care provider on file. Current Meds:     Home Medications           Prior to Admission medications    Medication Sig Start Date End Date Taking?  Authorizing Provider   FLUoxetine (PROZAC) 40 MG capsule Take 1 capsule by mouth daily 5/18/22 6/17/22 Yes Cris Alaniz MD   FLUoxetine (PROZAC) 20 MG capsule Take 1 capsule by mouth daily 5/18/22   Yes Cris Alaniz MD traZODone (DESYREL) 150 MG tablet Take 1 tablet by mouth nightly 4/15/22 7/14/22   Elsie Calle MD   vitamin D (ERGOCALCIFEROL) 1.25 MG (83356 UT) CAPS capsule Take 1 capsule by mouth once a week 10/14/21     Jen Marrufo MD            MSE:  Appearance: Casually dressed  Behavior: Calm, cooperative  Speech: Normal in tone, volume, and quality. No slurring, dysarthria or pressured speech noted. Mood: \"Good\"   Affect: Bright  Thought Process: Appears linear, logical and goal oriented. Causality appears intact. Thought Content: Denies active suicidal and homicidal ideations. No overt delusions or paranoia appreciated. Perceptions: Denies auditory or visual hallucinations at present time. Not responding to internal stimuli. Concentration: Intact. Orientation: to person, place, date, and situation. Language: Intact. Fund of information: Intact. Memory: Recent and remote appear intact. Impulsivity: Limited. Neurovegitative: Fair appetite and sleep. Insight: Fair. Judgment: Fair.               Lab Results   Component Value Date      10/05/2021     K 4.1 10/05/2021      10/05/2021     CO2 24 10/05/2021     BUN 10 10/05/2021     CREATININE 0.9 10/05/2021     GLUCOSE 108 10/05/2021     CALCIUM 8.7 (L) 10/05/2021     PROT 6.1 (L) 10/05/2021     LABALBU 4.1 10/05/2021     BILITOT <0.2 10/05/2021     ALKPHOS 55 10/05/2021     AST 15 10/05/2021     ALT 16 10/05/2021     LABGLOM >60 10/05/2021     GFRAA >59 10/05/2021            Lab Results   Component Value Date      10/05/2021     K 4.1 10/05/2021     K 3.6 02/21/2021      10/05/2021     CO2 24 10/05/2021     BUN 10 10/05/2021     CREATININE 0.9 10/05/2021     GLUCOSE 108 10/05/2021     CALCIUM 8.7 10/05/2021            Lab Results   Component Value Date     CHOL 228 (H) 10/07/2021            Lab Results   Component Value Date     TRIG 167 (H) 10/07/2021            Lab Results   Component Value Date     HDL 53 (L) 10/07/2021 Lab Results   Component Value Date     LDLCALC 142 10/07/2021      No results found for: LABVLDL, VLDL  No results found for: [facility]        Lab Results   Component Value Date     LABA1C 5.7 10/07/2021      No results found for: EAG        Lab Results   Component Value Date     TSHFT4 0.65 02/23/2021            Lab Results   Component Value Date     VITD25 28.9 (L) 10/07/2021               Assessment:    1. Major depressive disorder, recurrent, mild ([facility] [address]    2. Anxiety          No evidence of acute suicidality, homicidality or psychosis observed. Psychiatrically stable and doing well. Plan:  1. Continue current regimen. The risks, benefits, side effects, indications, contraindications, and adverse effects of the medications have been discussed. Yes.  2. The pt has verbalized understanding and has capacity to give informed consent. 3. The [facility] Quinonez report has been reviewed according to [facility] regulations. 4. Supportive therapy offered. 5. Follow up:    Return in about 4 months (around 9/18/2022). 6. The patient has been advised to call with any problems. 7. Controlled substance Treatment Plan: NA  8. The above listed medications have been continued, modifications in meds and other orders/labs as follows:                 Encounter Medications         Orders Placed This Encounter   Medications    FLUoxetine (PROZAC) 40 MG capsule       Sig: Take 1 capsule by mouth daily       Dispense:  30 capsule       Refill:  3    FLUoxetine (PROZAC) 20 MG capsule       Sig: Take 1 capsule by mouth daily       Dispense:  30 capsule       Refill:  3                     No orders of the defined types were placed in this encounter. 9. Additional comments:            10.Over 50% of the total visit time of 30  minutes was spent on counseling and/or coordination of care of:                         1. Major depressive disorder, recurrent, mild ([facility] [address]    2.  Daniel Harman MD

## 2022-09-06 ENCOUNTER — TELEPHONE (OUTPATIENT)
Dept: PSYCHIATRY | Age: 67
End: 2022-09-06

## 2022-09-06 NOTE — TELEPHONE ENCOUNTER
Received fax from [facility] requesting a 90 day supply  for Asenapine 5mg sl. Spoke with pt as the RX ordered is for 2.5 mg. Pt stated that when she cuts the 5 mg tab to make the 2.5 mg that it just crumbles. Dr Pascual Chaidez given the above info and she wants pt to get the 2.5 tab not the 5 mg. She also does not want pt to get a 90 supply at a time of the med. Spoke with pharmacy staff and pt and discussed Dr Kathryn Maldonado directions above. Pharmacy staff reported they had changed it to  5 mg take 1/2 tab as it cut the cost from 100 dollars a month to under 40 dollars per month. Pharmacy stated that she would make a note to provide the 2.5 mg tabs-pt is aware and agreeable.

## 2022-09-08 RX ORDER — ASENAPINE MALEATE 2.5 MG/1
2.5 TABLET SUBLINGUAL 2 TIMES DAILY PRN
Qty: 45 TABLET | Refills: 2 | Status: SHIPPED | OUTPATIENT
Start: 2022-09-08 | End: 2022-10-31 | Stop reason: SDUPTHER

## 2022-09-08 NOTE — TELEPHONE ENCOUNTER
Pharmacy sent a request to refill pt's medication       Last office visit : 5/18/2022 Cosme Nayak MD   Next office visit : 9/20/2022 Cosme Nayak MD    Requested Prescriptions     Pending Prescriptions Disp Refills    asenapine maleate (SAPHRIS) 2.5 MG SUBL sublingual tablet 45 tablet 2     Sig: Place 1 tablet under the tongue 2 times daily as needed (anxiety)            Timothy Thomas            5/18/2022  [address] [facility], [place]  Psychiatry Major depressive disorder, recurrent, mild ([facility] [address] +1 more  Dx Medication Check  Reason for Visit     Progress Notes  Paul Haile MD (Physician)   Psychiatry  Expand All Collapse All  Dirk Moreira Case is a 73 y.o. female evaluated via telephone on 5/18/2022. Dirk Moreira Case 1955                               Chief Complaint   Patient presents with    Medication Check            Subjective:       58-year-old white female with history of depression, PTSD and anxiety presents for follow. Patient is on Prozac, Trazodone. Reports compliance. Patient is calm and cooperative on the phone. Smiling. \"Doing good. Very calm\". She denies suicidal ideation. Sleeping well. Good appetite. Spending time with her family and grandchildren. Continues with community service. Still drug testing, on probation. States daughter-in-law will be moving out in June. Reports some issues with concentration. Staying sober. Her cat gave birth. Spending a lot of time with pets. Current Substance Use: Denies alcohol use. Denies drug use. Denies tobacco use.      BP: /65   Pulse 88   Temp 98.3 °F (36.8 °C)   Ht 5' 5\" (1.651 m)   Wt 137 lb 8 oz (62.4 kg)   LMP  (LMP Unknown)   SpO2 95%   BMI 22.88 kg/m²         Review of Systems - 14 point review:  Negative except     Constitutional: (fevers, chills, night sweats, wt loss/gain, change in appetite, fatigue, somnolence)     HEENT: (ear pain or discharge, hearing loss, ear ringing, sinus pressure, nosebleed, nasal discharge, sore throat, oral sores, tooth pain, bleeding gums, hoarse voice, neck pain)      Cardiovascular: (HTN, chest pain, elevated cholesterol/lipids, palpitations, leg swelling, leg pain with walking)     Respiratory: (cough, wheezing, snoring, SOB with activity (dyspnea), SOB while lying flat (orthopnea), awakening with severe SOB (paroxysmal nocturnal dyspnea))     Gastrointestinal: (NVD, constipation, abdominal pain, bright red stools, black tarry stools, stool incontinence)     Genitourinary:  (pelvic pain, burning or frequency of urination, urinary urgency, blood in urine incomplete bladder emptying, urinary incontinence, STD; MEN: testicular pain or swelling, erectile dysfunction; WOMEN: LMP, heavy menstrual bleeding (menorrhagia), irregular periods, postmenopausal bleeding, menstrual pain (dymenorrhea, vaginal discharge)     Musculoskeletal: (bone pain/fracture, joint pain or swelling, musle pain)     Integumentary: (rashes, acne, non-healing sores, itching, breast lumps, breast pain, nipple discharge, hair loss)     Neurologic: (HA, muscle weakness, paresthesias (numbness, coldness, crawling or prickling), memory loss, seizure, dizziness)     Endocrine: (heat or cold intolerance, excessive thirst (polydipsia), excessive hunger (polyphagia))     Immune/Allergic: (hives, seasonal or environmental allergies, HIV exposure)     Hematologic/Lymphatic: (lymph node enlargement, easy bleeding or bruising)     History obtained via chart review and patient     PCP is No primary care provider on file. Current Meds:     Home Medications           Prior to Admission medications    Medication Sig Start Date End Date Taking?  Authorizing Provider   FLUoxetine (PROZAC) 40 MG capsule Take 1 capsule by mouth daily 5/18/22 6/17/22 Yes Pearl Gamez MD   FLUoxetine (PROZAC) 20 MG capsule Take 1 capsule by mouth daily 5/18/22   Yes Pearl Gamez MD   traZODone (DESYREL) 150 MG tablet Take 1 tablet by mouth nightly 4/15/22 7/14/22   Joey Mathews MD   vitamin D (ERGOCALCIFEROL) 1.25 MG (84059 UT) CAPS capsule Take 1 capsule by mouth once a week 10/14/21     Juan Miguel Martinez MD            MSE:  Appearance: Casually dressed  Behavior: Calm, cooperative  Speech: Normal in tone, volume, and quality. No slurring, dysarthria or pressured speech noted. Mood: \"Good\"   Affect: Bright  Thought Process: Appears linear, logical and goal oriented. Causality appears intact. Thought Content: Denies active suicidal and homicidal ideations. No overt delusions or paranoia appreciated. Perceptions: Denies auditory or visual hallucinations at present time. Not responding to internal stimuli. Concentration: Intact. Orientation: to person, place, date, and situation. Language: Intact. Fund of information: Intact. Memory: Recent and remote appear intact. Impulsivity: Limited. Neurovegitative: Fair appetite and sleep. Insight: Fair. Judgment: Fair.               Lab Results   Component Value Date      10/05/2021     K 4.1 10/05/2021      10/05/2021     CO2 24 10/05/2021     BUN 10 10/05/2021     CREATININE 0.9 10/05/2021     GLUCOSE 108 10/05/2021     CALCIUM 8.7 (L) 10/05/2021     PROT 6.1 (L) 10/05/2021     LABALBU 4.1 10/05/2021     BILITOT <0.2 10/05/2021     ALKPHOS 55 10/05/2021     AST 15 10/05/2021     ALT 16 10/05/2021     LABGLOM >60 10/05/2021     GFRAA >59 10/05/2021            Lab Results   Component Value Date      10/05/2021     K 4.1 10/05/2021     K 3.6 02/21/2021      10/05/2021     CO2 24 10/05/2021     BUN 10 10/05/2021     CREATININE 0.9 10/05/2021     GLUCOSE 108 10/05/2021     CALCIUM 8.7 10/05/2021            Lab Results   Component Value Date     CHOL 228 (H) 10/07/2021            Lab Results   Component Value Date     TRIG 167 (H) 10/07/2021            Lab Results   Component Value Date     HDL 53 (L) 10/07/2021            Lab Results   Component Value Date     LDLCALC 142 10/07/2021      No results found for: LABVLDL, VLDL  No results found for: [facility]        Lab Results   Component Value Date     LABA1C 5.7 10/07/2021      No results found for: EAG        Lab Results   Component Value Date     TSHFT4 0.65 02/23/2021            Lab Results   Component Value Date     VITD25 28.9 (L) 10/07/2021               Assessment:    1. Major depressive disorder, recurrent, mild ([facility] [address]    2. Anxiety          No evidence of acute suicidality, homicidality or psychosis observed. Psychiatrically stable and doing well. Plan:  1. Continue current regimen. The risks, benefits, side effects, indications, contraindications, and adverse effects of the medications have been discussed. Yes.  2. The pt has verbalized understanding and has capacity to give informed consent. 3. The Tim Perea report has been reviewed according to [facility] regulations. 4. Supportive therapy offered. 5. Follow up:    Return in about 4 months (around 9/18/2022). 6. The patient has been advised to call with any problems. 7. Controlled substance Treatment Plan: NA  8. The above listed medications have been continued, modifications in meds and other orders/labs as follows:                 Encounter Medications         Orders Placed This Encounter   Medications    FLUoxetine (PROZAC) 40 MG capsule       Sig: Take 1 capsule by mouth daily       Dispense:  30 capsule       Refill:  3    FLUoxetine (PROZAC) 20 MG capsule       Sig: Take 1 capsule by mouth daily       Dispense:  30 capsule       Refill:  3                     No orders of the defined types were placed in this encounter. 9. Additional comments:            10.Over 50% of the total visit time of 30  minutes was spent on counseling and/or coordination of care of:                         1. Major depressive disorder, recurrent, mild ([facility] [address]    2.  Virginia Giles MD

## 2022-09-19 ENCOUNTER — TELEPHONE (OUTPATIENT)
Dept: PSYCHIATRY | Age: 67
End: 2022-09-19

## 2022-10-10 RX ORDER — TRAZODONE HYDROCHLORIDE 150 MG/1
150 TABLET ORAL NIGHTLY
Qty: 90 TABLET | Refills: 2 | Status: SHIPPED | OUTPATIENT
Start: 2022-10-10 | End: 2023-01-08

## 2022-10-10 NOTE — TELEPHONE ENCOUNTER
Pharmacy sent a request to refill pt's medication       Last office visit : 5/18/2022 Marisol Monet MD  Next office visit : 10/11/2022 Marisol Monet MD    Requested Prescriptions     Pending Prescriptions Disp Refills    traZODone (DESYREL) 150 MG tablet [Pharmacy Med Name: TRAZODONE 150 MG TABLET] 90 tablet 0     Sig: TAKE 1 TABLET BY MOUTH NIGHTLY            Timothy Thomas       5/18/2022  [address] Psychiatry Brigitte Montoya MD  Psychiatry Major depressive disorder, recurrent, mild ([address] +1 more  Dx Medication Check  Reason for Visit     Progress Notes  Kalpana Muñoz MD (Physician)   Psychiatry  Expand All Collapse All  Darlene Khan Case is a 73 y.o. female evaluated via telephone on 5/18/2022. Darlene Khan Case 1955                               Chief Complaint   Patient presents with    Medication Check            Subjective:       28-year-old white female with history of depression, PTSD and anxiety presents for follow. Patient is on Prozac, Trazodone. Reports compliance. Patient is calm and cooperative on the phone. Smiling. \"Doing good. Very calm\". She denies suicidal ideation. Sleeping well. Good appetite. Spending time with her family and grandchildren. Continues with community service. Still drug testing, on probation. States daughter-in-law will be moving out in June. Reports some issues with concentration. Staying sober. Her cat gave birth. Spending a lot of time with pets. Current Substance Use: Denies alcohol use. Denies drug use. Denies tobacco use.      BP: /65   Pulse 88   Temp 98.3 °F (36.8 °C)   Ht 5' 5\" (1.651 m)   Wt 137 lb 8 oz (62.4 kg)   LMP  (LMP Unknown)   SpO2 95%   BMI 22.88 kg/m²         Review of Systems - 14 point review:  Negative except     Constitutional: (fevers, chills, night sweats, wt loss/gain, change in appetite, fatigue, somnolence)     HEENT: (ear pain or discharge, hearing loss, ear ringing, sinus pressure, nosebleed, nasal discharge, sore throat, oral sores, tooth pain, bleeding gums, hoarse voice, neck pain)      Cardiovascular: (HTN, chest pain, elevated cholesterol/lipids, palpitations, leg swelling, leg pain with walking)     Respiratory: (cough, wheezing, snoring, SOB with activity (dyspnea), SOB while lying flat (orthopnea), awakening with severe SOB (paroxysmal nocturnal dyspnea))     Gastrointestinal: (NVD, constipation, abdominal pain, bright red stools, black tarry stools, stool incontinence)     Genitourinary:  (pelvic pain, burning or frequency of urination, urinary urgency, blood in urine incomplete bladder emptying, urinary incontinence, STD; MEN: testicular pain or swelling, erectile dysfunction; WOMEN: LMP, heavy menstrual bleeding (menorrhagia), irregular periods, postmenopausal bleeding, menstrual pain (dymenorrhea, vaginal discharge)     Musculoskeletal: (bone pain/fracture, joint pain or swelling, musle pain)     Integumentary: (rashes, acne, non-healing sores, itching, breast lumps, breast pain, nipple discharge, hair loss)     Neurologic: (HA, muscle weakness, paresthesias (numbness, coldness, crawling or prickling), memory loss, seizure, dizziness)     Endocrine: (heat or cold intolerance, excessive thirst (polydipsia), excessive hunger (polyphagia))     Immune/Allergic: (hives, seasonal or environmental allergies, HIV exposure)     Hematologic/Lymphatic: (lymph node enlargement, easy bleeding or bruising)     History obtained via chart review and patient     PCP is No primary care provider on file. Current Meds:     Home Medications           Prior to Admission medications    Medication Sig Start Date End Date Taking?  Authorizing Provider   FLUoxetine (PROZAC) 40 MG capsule Take 1 capsule by mouth daily 5/18/22 6/17/22 Yes Shayan Wakefield MD   FLUoxetine (PROZAC) 20 MG capsule Take 1 capsule by mouth daily 5/18/22   Yes Shayan Wakefield MD   traZODone (DESYREL) 150 MG tablet Take 1 tablet by mouth nightly 4/15/22 7/14/22   Cris Alaniz MD   vitamin D (ERGOCALCIFEROL) 1.25 MG (79869 UT) CAPS capsule Take 1 capsule by mouth once a week 10/14/21     Lenin Berger MD            MSE:  Appearance: Casually dressed  Behavior: Calm, cooperative  Speech: Normal in tone, volume, and quality. No slurring, dysarthria or pressured speech noted. Mood: \"Good\"   Affect: Bright  Thought Process: Appears linear, logical and goal oriented. Causality appears intact. Thought Content: Denies active suicidal and homicidal ideations. No overt delusions or paranoia appreciated. Perceptions: Denies auditory or visual hallucinations at present time. Not responding to internal stimuli. Concentration: Intact. Orientation: to person, place, date, and situation. Language: Intact. Fund of information: Intact. Memory: Recent and remote appear intact. Impulsivity: Limited. Neurovegitative: Fair appetite and sleep. Insight: Fair. Judgment: Fair.               Lab Results   Component Value Date      10/05/2021     K 4.1 10/05/2021      10/05/2021     CO2 24 10/05/2021     BUN 10 10/05/2021     CREATININE 0.9 10/05/2021     GLUCOSE 108 10/05/2021     CALCIUM 8.7 (L) 10/05/2021     PROT 6.1 (L) 10/05/2021     LABALBU 4.1 10/05/2021     BILITOT <0.2 10/05/2021     ALKPHOS 55 10/05/2021     AST 15 10/05/2021     ALT 16 10/05/2021     LABGLOM >60 10/05/2021     GFRAA >59 10/05/2021            Lab Results   Component Value Date      10/05/2021     K 4.1 10/05/2021     K 3.6 02/21/2021      10/05/2021     CO2 24 10/05/2021     BUN 10 10/05/2021     CREATININE 0.9 10/05/2021     GLUCOSE 108 10/05/2021     CALCIUM 8.7 10/05/2021            Lab Results   Component Value Date     CHOL 228 (H) 10/07/2021            Lab Results   Component Value Date     TRIG 167 (H) 10/07/2021            Lab Results   Component Value Date     HDL 53 (L) 10/07/2021            Lab Results   Component Value Date     LDLCALC 142 10/07/2021      No results found for: LABVLDL, VLDL  No results found for: [facility]        Lab Results   Component Value Date     LABA1C 5.7 10/07/2021      No results found for: EAG        Lab Results   Component Value Date     TSHFT4 0.65 02/23/2021            Lab Results   Component Value Date     VITD25 28.9 (L) 10/07/2021               Assessment:    1. Major depressive disorder, recurrent, mild ([facility] [address]    2. Anxiety          No evidence of acute suicidality, homicidality or psychosis observed. Psychiatrically stable and doing well. Plan:  1. Continue current regimen. The risks, benefits, side effects, indications, contraindications, and adverse effects of the medications have been discussed. Yes.  2. The pt has verbalized understanding and has capacity to give informed consent. 3. The [facility]il Perez report has been reviewed according to [facility] regulations. 4. Supportive therapy offered. 5. Follow up:    Return in about 4 months (around 9/18/2022). 6. The patient has been advised to call with any problems. 7. Controlled substance Treatment Plan: NA  8. The above listed medications have been continued, modifications in meds and other orders/labs as follows:                 Encounter Medications         Orders Placed This Encounter   Medications    FLUoxetine (PROZAC) 40 MG capsule       Sig: Take 1 capsule by mouth daily       Dispense:  30 capsule       Refill:  3    FLUoxetine (PROZAC) 20 MG capsule       Sig: Take 1 capsule by mouth daily       Dispense:  30 capsule       Refill:  3                     No orders of the defined types were placed in this encounter. 9. Additional comments:            10.Over 50% of the total visit time of 30  minutes was spent on counseling and/or coordination of care of:                         1. Major depressive disorder, recurrent, mild ([facility] [address]    2.  Amanda Sutton MD

## 2022-10-10 NOTE — TELEPHONE ENCOUNTER
Called pt for appointment reminder.     -Pt confirmed      Electronically signed by Meggan Peña MA on 10/10/2022 at 11:00 AM

## 2022-10-11 ENCOUNTER — OFFICE VISIT (OUTPATIENT)
Dept: PSYCHIATRY | Age: 67
End: 2022-10-11
Payer: MEDICARE

## 2022-10-11 VITALS
BODY MASS INDEX: 22.59 KG/M2 | DIASTOLIC BLOOD PRESSURE: 75 MMHG | TEMPERATURE: 98.2 F | OXYGEN SATURATION: 96 % | SYSTOLIC BLOOD PRESSURE: 114 MMHG | HEART RATE: 87 BPM | HEIGHT: 65 IN | WEIGHT: 135.6 LBS

## 2022-10-11 DIAGNOSIS — F33.40 RECURRENT MAJOR DEPRESSIVE DISORDER, IN REMISSION (HCC): Primary | ICD-10-CM

## 2022-10-11 DIAGNOSIS — F41.9 ANXIETY: ICD-10-CM

## 2022-10-11 DIAGNOSIS — F43.12 CHRONIC POST-TRAUMATIC STRESS DISORDER (PTSD): ICD-10-CM

## 2022-10-11 PROCEDURE — 1124F ACP DISCUSS-NO DSCNMKR DOCD: CPT | Performed by: PSYCHIATRY & NEUROLOGY

## 2022-10-11 PROCEDURE — 99443 PR PHYS/QHP TELEPHONE EVALUATION 21-30 MIN: CPT | Performed by: PSYCHIATRY & NEUROLOGY

## 2022-10-11 ASSESSMENT — PATIENT HEALTH QUESTIONNAIRE - PHQ9
10. IF YOU CHECKED OFF ANY PROBLEMS, HOW DIFFICULT HAVE THESE PROBLEMS MADE IT FOR YOU TO DO YOUR WORK, TAKE CARE OF THINGS AT HOME, OR GET ALONG WITH OTHER PEOPLE: 0
SUM OF ALL RESPONSES TO PHQ QUESTIONS 1-9: 3
SUM OF ALL RESPONSES TO PHQ9 QUESTIONS 1 & 2: 0
SUM OF ALL RESPONSES TO PHQ QUESTIONS 1-9: 3
1. LITTLE INTEREST OR PLEASURE IN DOING THINGS: 0
4. FEELING TIRED OR HAVING LITTLE ENERGY: 1
7. TROUBLE CONCENTRATING ON THINGS, SUCH AS READING THE NEWSPAPER OR WATCHING TELEVISION: 1
8. MOVING OR SPEAKING SO SLOWLY THAT OTHER PEOPLE COULD HAVE NOTICED. OR THE OPPOSITE, BEING SO FIGETY OR RESTLESS THAT YOU HAVE BEEN MOVING AROUND A LOT MORE THAN USUAL: 0
5. POOR APPETITE OR OVEREATING: 0
2. FEELING DOWN, DEPRESSED OR HOPELESS: 0
9. THOUGHTS THAT YOU WOULD BE BETTER OFF DEAD, OR OF HURTING YOURSELF: 0
6. FEELING BAD ABOUT YOURSELF - OR THAT YOU ARE A FAILURE OR HAVE LET YOURSELF OR YOUR FAMILY DOWN: 0
SUM OF ALL RESPONSES TO PHQ QUESTIONS 1-9: 3
SUM OF ALL RESPONSES TO PHQ QUESTIONS 1-9: 3
3. TROUBLE FALLING OR STAYING ASLEEP: 1

## 2022-10-11 NOTE — PROGRESS NOTES
Katie Woodson Case is a 78 y.o. female evaluated via telephone on 10/11/2022. Katie Woodson Case 1955      Chief Complaint   Patient presents with    Medication Check           Subjective:      73-year-old white female with history of depression, PTSD and anxiety presents for follow. Patient is on Prozac, Trazodone. Reports compliance. Patient is calm and cooperative on the phone. Smiling. \"Doing good. Emotionless at times\". She denies suicidal ideation. Sleeping well but wakes up early. Good appetite. Spending time with her son and grandchildren. Clear of drug charges. States daughter-in-law is still in the house. Son released from [employer] today. Concentration still poor. Staying sober. Spending a lot of time with pets. Current Substance Use: Denies alcohol use. Denies drug use. Denies tobacco use.     BP: /75   Pulse 87   Temp 98.2 °F (36.8 °C)   Ht 5' 5\" (1.651 m)   Wt 135 lb 9.6 oz (61.5 kg)   LMP  (LMP Unknown)   SpO2 96%   BMI 22.57 kg/m²       Review of Systems - 14 point review:  Negative except    Constitutional: (fevers, chills, night sweats, wt loss/gain, change in appetite, fatigue, somnolence)    HEENT: (ear pain or discharge, hearing loss, ear ringing, sinus pressure, nosebleed, nasal discharge, sore throat, oral sores, tooth pain, bleeding gums, hoarse voice, neck pain)      Cardiovascular: (HTN, chest pain, elevated cholesterol/lipids, palpitations, leg swelling, leg pain with walking)    Respiratory: (cough, wheezing, snoring, SOB with activity (dyspnea), SOB while lying flat (orthopnea), awakening with severe SOB (paroxysmal nocturnal dyspnea))    Gastrointestinal: (NVD, constipation, abdominal pain, bright red stools, black tarry stools, stool incontinence)     Genitourinary:  (pelvic pain, burning or frequency of urination, urinary urgency, blood in urine incomplete bladder emptying, urinary incontinence, STD; MEN: testicular pain or swelling, erectile dysfunction; WOMEN: LMP, heavy menstrual bleeding (menorrhagia), irregular periods, postmenopausal bleeding, menstrual pain (dymenorrhea, vaginal discharge)    Musculoskeletal: (bone pain/fracture, joint pain or swelling, musle pain)    Integumentary: (rashes, acne, non-healing sores, itching, breast lumps, breast pain, nipple discharge, hair loss)    Neurologic: (HA, muscle weakness, paresthesias (numbness, coldness, crawling or prickling), memory loss, seizure, dizziness)    Endocrine: (heat or cold intolerance, excessive thirst (polydipsia), excessive hunger (polyphagia))    Immune/Allergic: (hives, seasonal or environmental allergies, HIV exposure)    Hematologic/Lymphatic: (lymph node enlargement, easy bleeding or bruising)    History obtained via chart review and patient    PCP is No primary care provider on file. Current Meds:    Prior to Admission medications    Medication Sig Start Date End Date Taking? Authorizing Provider   traZODone (DESYREL) 150 MG tablet TAKE 1 TABLET BY MOUTH NIGHTLY 10/10/22 1/8/23  Naya Vanegas MD   asenapine maleate (SAPHRIS) 2.5 MG SUBL sublingual tablet Place 1 tablet under the tongue 2 times daily as needed (anxiety) 9/8/22 10/8/22  Naya Vanegas MD   FLUoxetine (PROZAC) 40 MG capsule Take 1 capsule by mouth daily 6/17/22 7/17/22  Naya Vanegas MD   FLUoxetine (PROZAC) 20 MG capsule Take 1 capsule by mouth daily 6/9/22   Naya Vanegas MD   vitamin D (ERGOCALCIFEROL) 1.25 MG (47379 UT) CAPS capsule Take 1 capsule by mouth once a week 10/14/21   Natalie Blackwell MD       MSE:  Appearance: Casually dressed  Behavior: Calm, cooperative, smiling somewhat superficially  Speech: Normal in tone, volume, and quality. No slurring, dysarthria or pressured speech noted. Mood: \"Good\"   Affect: Superficially bright  Thought Process: Appears linear, logical and goal oriented. Causality appears intact. Thought Content: Denies active suicidal and homicidal ideations.  No overt delusions or paranoia appreciated. Perceptions: Denies auditory or visual hallucinations at present time. Not responding to internal stimuli. Concentration: Intact. Orientation: to person, place, date, and situation. Language: Intact. Fund of information: Intact. Memory: Recent and remote appear intact. Impulsivity: Limited. Neurovegitative: Fair appetite and sleep. Insight: Fair. Judgment: Fair. Lab Results   Component Value Date     10/05/2021    K 4.1 10/05/2021     10/05/2021    CO2 24 10/05/2021    BUN 10 10/05/2021    CREATININE 0.9 10/05/2021    GLUCOSE 108 10/05/2021    CALCIUM 8.7 (L) 10/05/2021    PROT 6.1 (L) 10/05/2021    LABALBU 4.1 10/05/2021    BILITOT <0.2 10/05/2021    ALKPHOS 55 10/05/2021    AST 15 10/05/2021    ALT 16 10/05/2021    LABGLOM >60 10/05/2021    GFRAA >59 10/05/2021     Lab Results   Component Value Date/Time     10/05/2021 10:09 AM    K 4.1 10/05/2021 10:09 AM    K 3.6 02/21/2021 11:32 AM     10/05/2021 10:09 AM    CO2 24 10/05/2021 10:09 AM    BUN 10 10/05/2021 10:09 AM    CREATININE 0.9 10/05/2021 10:09 AM    GLUCOSE 108 10/05/2021 10:09 AM    CALCIUM 8.7 10/05/2021 10:09 AM      Lab Results   Component Value Date    CHOL 228 (H) 10/07/2021     Lab Results   Component Value Date    TRIG 167 (H) 10/07/2021     Lab Results   Component Value Date    HDL 53 (L) 10/07/2021     Lab Results   Component Value Date    LDLCALC 142 10/07/2021     No results found for: LABVLDL, VLDL  No results found for: [facility]  Lab Results   Component Value Date    LABA1C 5.7 10/07/2021     No results found for: EAG  Lab Results   Component Value Date    TSHFT4 0.65 02/23/2021     Lab Results   Component Value Date    VITD25 28.9 (L) 10/07/2021           Assessment:   1. Recurrent major depressive disorder, in remission ([facility] [address]    2. Anxiety    3.  Chronic post-traumatic stress disorder (PTSD)          No evidence of acute suicidality, homicidality or psychosis observed. Psychiatrically stable and doing well. Plan:  1. Continue current regimen. The risks, benefits, side effects, indications, contraindications, and adverse effects of the medications have been discussed. Yes.  2. The pt has verbalized understanding and has capacity to give informed consent. 3. The [facility] report has been reviewed according to [facility] regulations. 4. Supportive therapy offered. 5. Follow up: Return in about 6 months (around 4/11/2023). 6. The patient has been advised to call with any problems. 7. Controlled substance Treatment Plan: NA  8. The above listed medications have been continued, modifications in meds and other orders/labs as follows: No orders of the defined types were placed in this encounter. No orders of the defined types were placed in this encounter. 9. Additional comments:         10.Over 50% of the total visit time of 30  minutes was spent on counseling and/or coordination of care of:                        1. Recurrent major depressive disorder, in remission ([facility] [address]    2. Anxiety    3.  Chronic post-traumatic stress disorder (PTSD)              Francisca Salazar MD medication change been

## 2022-10-31 RX ORDER — ASENAPINE MALEATE 2.5 MG/1
2.5 TABLET SUBLINGUAL 2 TIMES DAILY PRN
Qty: 45 TABLET | Refills: 2 | Status: SHIPPED | OUTPATIENT
Start: 2022-10-31 | End: 2022-11-30

## 2022-10-31 NOTE — TELEPHONE ENCOUNTER
Pharmacy sent a request to refill pt's medication       Last office visit : 10/11/2022 Stew Gamboa MD  Next office visit : 4/11/2023 Stew Gamboa MD    Requested Prescriptions     Pending Prescriptions Disp Refills    asenapine maleate (SAPHRIS) 2.5 MG SUBL sublingual tablet 45 tablet 2     Sig: Place 1 tablet under the tongue 2 times daily as needed (anxiety)            Timothy Thomas         10/11/2022  [address] [facility]  Shayan Wakefield MD  Psychiatry Recurrent major depressive disorder, in remission ([address] +2 more  Dx Medication Check  Reason for Visit     Progress Notes  Shayan Wakefield MD (Physician)   Psychiatry  Expand All Collapse All  Tono Agarwal Case is a 78 y.o. female evaluated via telephone on 10/11/2022. Tono Agarwal Case 1955                               Chief Complaint   Patient presents with    Medication Check               Subjective:       60-year-old white female with history of depression, PTSD and anxiety presents for follow. Patient is on Prozac, Trazodone. Reports compliance. Patient is calm and cooperative on the phone. Smiling. \"Doing good. Emotionless at times\". She denies suicidal ideation. Sleeping well but wakes up early. Good appetite. Spending time with her son and grandchildren. Clear of drug charges. States daughter-in-law is still in the house. Son released from prison today. Concentration still poor. Staying sober. Spending a lot of time with pets. Current Substance Use: Denies alcohol use. Denies drug use. Denies tobacco use.      BP: /75   Pulse 87   Temp 98.2 °F (36.8 °C)   Ht 5' 5\" (1.651 m)   Wt 135 lb 9.6 oz (61.5 kg)   LMP  (LMP Unknown)   SpO2 96%   BMI 22.57 kg/m²         Review of Systems - 14 point review:  Negative except     Constitutional: (fevers, chills, night sweats, wt loss/gain, change in appetite, fatigue, somnolence)     HEENT: (ear pain or discharge, hearing loss, ear ringing, sinus pressure, nosebleed, nasal discharge, sore throat, oral sores, tooth pain, bleeding gums, hoarse voice, neck pain)      Cardiovascular: (HTN, chest pain, elevated cholesterol/lipids, palpitations, leg swelling, leg pain with walking)     Respiratory: (cough, wheezing, snoring, SOB with activity (dyspnea), SOB while lying flat (orthopnea), awakening with severe SOB (paroxysmal nocturnal dyspnea))     Gastrointestinal: (NVD, constipation, abdominal pain, bright red stools, black tarry stools, stool incontinence)     Genitourinary:  (pelvic pain, burning or frequency of urination, urinary urgency, blood in urine incomplete bladder emptying, urinary incontinence, STD; MEN: testicular pain or swelling, erectile dysfunction; WOMEN: LMP, heavy menstrual bleeding (menorrhagia), irregular periods, postmenopausal bleeding, menstrual pain (dymenorrhea, vaginal discharge)     Musculoskeletal: (bone pain/fracture, joint pain or swelling, musle pain)     Integumentary: (rashes, acne, non-healing sores, itching, breast lumps, breast pain, nipple discharge, hair loss)     Neurologic: (HA, muscle weakness, paresthesias (numbness, coldness, crawling or prickling), memory loss, seizure, dizziness)     Endocrine: (heat or cold intolerance, excessive thirst (polydipsia), excessive hunger (polyphagia))     Immune/Allergic: (hives, seasonal or environmental allergies, HIV exposure)     Hematologic/Lymphatic: (lymph node enlargement, easy bleeding or bruising)     History obtained via chart review and patient     PCP is No primary care provider on file. Current Meds:     Home Medications           Prior to Admission medications    Medication Sig Start Date End Date Taking?  Authorizing Provider   traZODone (DESYREL) 150 MG tablet TAKE 1 TABLET BY MOUTH NIGHTLY 10/10/22 1/8/23   Sobeida Luis MD   asenapine maleate (SAPHRIS) 2.5 MG SUBL sublingual tablet Place 1 tablet under the tongue 2 times daily as needed (anxiety) 9/8/22 10/8/22   Sobeida Luis MD FLUoxetine (PROZAC) 40 MG capsule Take 1 capsule by mouth daily 6/17/22 7/17/22   Jesse Silveira MD   FLUoxetine (PROZAC) 20 MG capsule Take 1 capsule by mouth daily 6/9/22     Jesse Silveira MD   vitamin D (ERGOCALCIFEROL) 1.25 MG (69501 UT) CAPS capsule Take 1 capsule by mouth once a week 10/14/21     Trell Valencia MD            MSE:  Appearance: Casually dressed  Behavior: Calm, cooperative, smiling somewhat superficially  Speech: Normal in tone, volume, and quality. No slurring, dysarthria or pressured speech noted. Mood: \"Good\"   Affect: Superficially bright  Thought Process: Appears linear, logical and goal oriented. Causality appears intact. Thought Content: Denies active suicidal and homicidal ideations. No overt delusions or paranoia appreciated. Perceptions: Denies auditory or visual hallucinations at present time. Not responding to internal stimuli. Concentration: Intact. Orientation: to person, place, date, and situation. Language: Intact. Fund of information: Intact. Memory: Recent and remote appear intact. Impulsivity: Limited. Neurovegitative: Fair appetite and sleep. Insight: Fair. Judgment: Fair.               Lab Results   Component Value Date      10/05/2021     K 4.1 10/05/2021      10/05/2021     CO2 24 10/05/2021     BUN 10 10/05/2021     CREATININE 0.9 10/05/2021     GLUCOSE 108 10/05/2021     CALCIUM 8.7 (L) 10/05/2021     PROT 6.1 (L) 10/05/2021     LABALBU 4.1 10/05/2021     BILITOT <0.2 10/05/2021     ALKPHOS 55 10/05/2021     AST 15 10/05/2021     ALT 16 10/05/2021     LABGLOM >60 10/05/2021     GFRAA >59 10/05/2021            Lab Results   Component Value Date/Time      10/05/2021 10:09 AM     K 4.1 10/05/2021 10:09 AM     K 3.6 02/21/2021 11:32 AM      10/05/2021 10:09 AM     CO2 24 10/05/2021 10:09 AM     BUN 10 10/05/2021 10:09 AM     CREATININE 0.9 10/05/2021 10:09 AM     GLUCOSE 108 10/05/2021 10:09 AM     CALCIUM 8.7 10/05/2021 10:09 AM            Lab Results   Component Value Date     CHOL 228 (H) 10/07/2021            Lab Results   Component Value Date     TRIG 167 (H) 10/07/2021            Lab Results   Component Value Date     HDL 53 (L) 10/07/2021            Lab Results   Component Value Date     LDLCALC 142 10/07/2021      No results found for: LABVLDL, VLDL  No results found for: [facility]        Lab Results   Component Value Date     LABA1C 5.7 10/07/2021      No results found for: EAG        Lab Results   Component Value Date     TSHFT4 0.65 02/23/2021            Lab Results   Component Value Date     VITD25 28.9 (L) 10/07/2021               Assessment:    1. Recurrent major depressive disorder, in remission ([facility] [address]    2. Anxiety    3. Chronic post-traumatic stress disorder (PTSD)             No evidence of acute suicidality, homicidality or psychosis observed. Psychiatrically stable and doing well. Plan:  1. Continue current regimen. The risks, benefits, side effects, indications, contraindications, and adverse effects of the medications have been discussed. Yes.  2. The pt has verbalized understanding and has capacity to give informed consent. 3. The [facility] People report has been reviewed according to [facility] regulations. 4. Supportive therapy offered. 5. Follow up:    Return in about 6 months (around 4/11/2023). 6. The patient has been advised to call with any problems. 7. Controlled substance Treatment Plan: NA  8. The above listed medications have been continued, modifications in meds and other orders/labs as follows:                 Encounter Medications    No orders of the defined types were placed in this encounter. No orders of the defined types were placed in this encounter.         9. Additional comments:            10.Over 50% of the total visit time of 30  minutes was spent on counseling and/or coordination of care of:                         1. Recurrent major depressive disorder, in remission ([facility]    2. Anxiety    3.  Chronic post-traumatic stress disorder (PTSD)                   Kalpana Muñoz MD

## 2022-11-04 ENCOUNTER — TELEPHONE (OUTPATIENT)
Dept: PSYCHIATRY | Age: 67
End: 2022-11-04

## 2022-11-04 RX ORDER — QUETIAPINE FUMARATE 50 MG/1
50 TABLET, FILM COATED ORAL NIGHTLY
Qty: 30 TABLET | Refills: 1 | Status: SHIPPED | OUTPATIENT
Start: 2022-11-04 | End: 2022-11-29 | Stop reason: SDUPTHER

## 2022-11-04 NOTE — TELEPHONE ENCOUNTER
Pt called stating that she was only sleeping about 4 hrs-interrupted in a 24 hr period. Pt stated she is taking the Trazodone 150 mg at bedtime. Pt stated that Seroquel 150 mg had helped in the past and wanted to know if Dr Demetria Lisa would switch her to that. Dr Demetria Lisa given the above info and then pt called back with her directives:    Continue the Trazodone 150 mg at bedtime and add Melatonin 5 mg at bedtime. Pt informed that Dr Demetria Lisa would not order Seroquel as pt is already on Saphris and Seroquel can cause increased blood sugar, increased cholesterol which can also lead to cardiac issues. Reviewed sleep hygiene with pt and she stated she was already doing most of the things mentioned (copy of the form mailed to the pt)    Pt stated that she was already taking Melatonin 10 mg at night with the Trazodone. \"I am desperate for sleep\". Pt informed that Dr Demetria Lisa would be given the above info and she would be called back if she gave any new directives.

## 2022-11-04 NOTE — TELEPHONE ENCOUNTER
Per direction of Dr Deysi Wei,  pt was informed that she would send in a RX for Seroquel 50 mg to be taken at bedtime, to continue the Trazodone 150  at bedtime and Melatonin 5 mg (not 10 mg) at bedtime. Pt verbalized understanding of the above info and plans to follow.

## 2022-11-29 ENCOUNTER — TELEPHONE (OUTPATIENT)
Dept: PSYCHIATRY | Age: 67
End: 2022-11-29

## 2022-11-29 RX ORDER — QUETIAPINE FUMARATE 50 MG/1
50 TABLET, FILM COATED ORAL NIGHTLY
Qty: 90 TABLET | Refills: 3 | Status: SHIPPED | OUTPATIENT
Start: 2022-11-29 | End: 2023-02-27

## 2022-11-29 NOTE — TELEPHONE ENCOUNTER
Pt made aware that a refill RX for Seroquel-90 day supply had been sent to her pharmacy per Dr Sangeetha Sotomayor.

## 2022-11-29 NOTE — TELEPHONE ENCOUNTER
[facility] sent fax requesting [address]  (Last RX sent 11/4/22 #30 with 1 refill-asking for a 90 day RX)      Last office visit : 10/11/2022 with Dr Jesse Guerrier  Next office visit : 4/11/2023 with Dr Jesse Guerrier    Requested Prescriptions      No prescriptions requested or ordered in this encounter            Jayro Carlos, RN             Jayro Carlos, RN   Registered Nurse      Telephone Encounter       Signed   Encounter Date:  11/4/2022                 Signed                Per direction of Dr Jesse Guerrier,  pt was informed that she would send in a RX for Seroquel 50 mg to be taken at bedtime, to continue the Trazodone 150  at bedtime and Melatonin 5 mg (not 10 mg) at bedtime. Pt verbalized understanding of the above info and plans to follow. Office Visit  10/11/2022  [address] [facility]  Seth Meneses MD  Psychiatry Recurrent major depressive disorder, in remission [facility]) +2 more  Dx Medication Check  Reason for Visit     Progress Notes  Seth Meneses MD (Physician)   Psychiatry  Expand All Collapse All  Alli Freeman Case is a 78 y.o. female evaluated via telephone on 10/11/2022. Alli Freeman Case 1955                               Chief Complaint   Patient presents with    Medication Check               Subjective:       60-year-old white female with history of depression, PTSD and anxiety presents for follow. Patient is on Prozac, Trazodone. Reports compliance. Patient is calm and cooperative on the phone. Smiling. \"Doing good. Emotionless at times\". She denies suicidal ideation. Sleeping well but wakes up early. Good appetite. Spending time with her son and grandchildren. Clear of drug charges. States daughter-in-law is still in the house. Son released from nursing home today. Concentration still poor. Staying sober. Spending a lot of time with pets. Current Substance Use: Denies alcohol use. Denies drug use. Denies tobacco use.      BP: /75 Pulse 87   Temp 98.2 °F (36.8 °C)   Ht 5' 5\" (1.651 m)   Wt 135 lb 9.6 oz (61.5 kg)   LMP  (LMP Unknown)   SpO2 96%   BMI 22.57 kg/m²         Review of Systems - 14 point review:  Negative except     Constitutional: (fevers, chills, night sweats, wt loss/gain, change in appetite, fatigue, somnolence)     HEENT: (ear pain or discharge, hearing loss, ear ringing, sinus pressure, nosebleed, nasal discharge, sore throat, oral sores, tooth pain, bleeding gums, hoarse voice, neck pain)      Cardiovascular: (HTN, chest pain, elevated cholesterol/lipids, palpitations, leg swelling, leg pain with walking)     Respiratory: (cough, wheezing, snoring, SOB with activity (dyspnea), SOB while lying flat (orthopnea), awakening with severe SOB (paroxysmal nocturnal dyspnea))     Gastrointestinal: (NVD, constipation, abdominal pain, bright red stools, black tarry stools, stool incontinence)     Genitourinary:  (pelvic pain, burning or frequency of urination, urinary urgency, blood in urine incomplete bladder emptying, urinary incontinence, STD; MEN: testicular pain or swelling, erectile dysfunction; WOMEN: LMP, heavy menstrual bleeding (menorrhagia), irregular periods, postmenopausal bleeding, menstrual pain (dymenorrhea, vaginal discharge)     Musculoskeletal: (bone pain/fracture, joint pain or swelling, musle pain)     Integumentary: (rashes, acne, non-healing sores, itching, breast lumps, breast pain, nipple discharge, hair loss)     Neurologic: (HA, muscle weakness, paresthesias (numbness, coldness, crawling or prickling), memory loss, seizure, dizziness)     Endocrine: (heat or cold intolerance, excessive thirst (polydipsia), excessive hunger (polyphagia))     Immune/Allergic: (hives, seasonal or environmental allergies, HIV exposure)     Hematologic/Lymphatic: (lymph node enlargement, easy bleeding or bruising)     History obtained via chart review and patient     PCP is No primary care provider on file.          Current Meds:     Home Medications           Prior to Admission medications    Medication Sig Start Date End Date Taking? Authorizing Provider   traZODone (DESYREL) 150 MG tablet TAKE 1 TABLET BY MOUTH NIGHTLY 10/10/22 1/8/23   Christi Hanson MD   asenapine maleate (SAPHRIS) 2.5 MG SUBL sublingual tablet Place 1 tablet under the tongue 2 times daily as needed (anxiety) 9/8/22 10/8/22   Christi Hanson MD   FLUoxetine (PROZAC) 40 MG capsule Take 1 capsule by mouth daily 6/17/22 7/17/22   Christi Hanson MD   FLUoxetine (PROZAC) 20 MG capsule Take 1 capsule by mouth daily 6/9/22     Christi Hanson MD   vitamin D (ERGOCALCIFEROL) 1.25 MG (62297 UT) CAPS capsule Take 1 capsule by mouth once a week 10/14/21     Thierno Moss MD            MSE:  Appearance: Casually dressed  Behavior: Calm, cooperative, smiling somewhat superficially  Speech: Normal in tone, volume, and quality. No slurring, dysarthria or pressured speech noted. Mood: \"Good\"   Affect: Superficially bright  Thought Process: Appears linear, logical and goal oriented. Causality appears intact. Thought Content: Denies active suicidal and homicidal ideations. No overt delusions or paranoia appreciated. Perceptions: Denies auditory or visual hallucinations at present time. Not responding to internal stimuli. Concentration: Intact. Orientation: to person, place, date, and situation. Language: Intact. Fund of information: Intact. Memory: Recent and remote appear intact. Impulsivity: Limited. Neurovegitative: Fair appetite and sleep. Insight: Fair. Judgment: Fair.               Lab Results   Component Value Date      10/05/2021     K 4.1 10/05/2021      10/05/2021     CO2 24 10/05/2021     BUN 10 10/05/2021     CREATININE 0.9 10/05/2021     GLUCOSE 108 10/05/2021     CALCIUM 8.7 (L) 10/05/2021     PROT 6.1 (L) 10/05/2021     LABALBU 4.1 10/05/2021     BILITOT <0.2 10/05/2021     ALKPHOS 55 10/05/2021     AST 15 10/05/2021     ALT 16 10/05/2021     LABGLOM >60 10/05/2021     GFRAA >59 10/05/2021            Lab Results   Component Value Date/Time      10/05/2021 10:09 AM     K 4.1 10/05/2021 10:09 AM     K 3.6 02/21/2021 11:32 AM      10/05/2021 10:09 AM     CO2 24 10/05/2021 10:09 AM     BUN 10 10/05/2021 10:09 AM     CREATININE 0.9 10/05/2021 10:09 AM     GLUCOSE 108 10/05/2021 10:09 AM     CALCIUM 8.7 10/05/2021 10:09 AM            Lab Results   Component Value Date     CHOL 228 (H) 10/07/2021            Lab Results   Component Value Date     TRIG 167 (H) 10/07/2021            Lab Results   Component Value Date     HDL 53 (L) 10/07/2021            Lab Results   Component Value Date     LDLCALC 142 10/07/2021      No results found for: LABVLDL, VLDL  No results found for: [facility]        Lab Results   Component Value Date     LABA1C 5.7 10/07/2021      No results found for: EAG        Lab Results   Component Value Date     TSHFT4 0.65 02/23/2021            Lab Results   Component Value Date     VITD25 28.9 (L) 10/07/2021               Assessment:    1. Recurrent major depressive disorder, in remission ([facility] [address]    2. Anxiety    3. Chronic post-traumatic stress disorder (PTSD)             No evidence of acute suicidality, homicidality or psychosis observed. Psychiatrically stable and doing well. Plan:  1. Continue current regimen. The risks, benefits, side effects, indications, contraindications, and adverse effects of the medications have been discussed. Yes.  2. The pt has verbalized understanding and has capacity to give informed consent. 3. The [facility] report has been reviewed according to [facility] regulations. 4. Supportive therapy offered. 5. Follow up:    Return in about 6 months (around 4/11/2023). 6. The patient has been advised to call with any problems. 7. Controlled substance Treatment Plan: NA  8.  The above listed medications have been continued, modifications in meds and other orders/labs as follows: Encounter Medications    No orders of the defined types were placed in this encounter. No orders of the defined types were placed in this encounter. 9. Additional comments:            10.Over 50% of the total visit time of 30  minutes was spent on counseling and/or coordination of care of:                         1. Recurrent major depressive disorder, in remission ([facility] [address]    2. Anxiety    3.  Chronic post-traumatic stress disorder (PTSD)                   Gilles Mart MD medication change been

## 2022-12-27 RX ORDER — FLUOXETINE HYDROCHLORIDE 40 MG/1
CAPSULE ORAL
Qty: 30 CAPSULE | Refills: 3 | Status: SHIPPED | OUTPATIENT
Start: 2022-12-27

## 2022-12-27 NOTE — TELEPHONE ENCOUNTER
Pharmacy sent a request to refill pt's medication       Last office visit : 10/11/2022 Ernestina Duffy MD  Next office visit : 4/11/2023 Ernestina Duffy MD    Requested Prescriptions     Pending Prescriptions Disp Refills    FLUoxetine (PROZAC) 40 MG capsule [Pharmacy Med Name: FLUOXETINE HCL 40 MG CAPSULE] 30 capsule 3     Sig: TAKE 1 CAPSULE BY MOUTH EVERY DAY            Timothy Thomas         10/11/2022  [address] [facility]  Hasmukh Zamarripa MD  Psychiatry Recurrent major depressive disorder, in remission ([facility] [address] +2 more  Dx Medication Check  Reason for Visit     Progress Notes  Hasmukh Zamarripa MD (Physician)   Psychiatry  Expand All Collapse All  Puneet Hernandes Case is a 78 y.o. female evaluated via telephone on 10/11/2022. Puneet Hernandes Case 1955                               Chief Complaint   Patient presents with    Medication Check               Subjective:       29-year-old white female with history of depression, PTSD and anxiety presents for follow. Patient is on Prozac, Trazodone. Reports compliance. Patient is calm and cooperative on the phone. Smiling. \"Doing good. Emotionless at times\". She denies suicidal ideation. Sleeping well but wakes up early. Good appetite. Spending time with her son and grandchildren. Clear of drug charges. States daughter-in-law is still in the house. Son released from skilled nursing today. Concentration still poor. Staying sober. Spending a lot of time with pets. Current Substance Use: Denies alcohol use. Denies drug use. Denies tobacco use.      BP: /75   Pulse 87   Temp 98.2 °F (36.8 °C)   Ht 5' 5\" (1.651 m)   Wt 135 lb 9.6 oz (61.5 kg)   LMP  (LMP Unknown)   SpO2 96%   BMI 22.57 kg/m²         Review of Systems - 14 point review:  Negative except     Constitutional: (fevers, chills, night sweats, wt loss/gain, change in appetite, fatigue, somnolence)     HEENT: (ear pain or discharge, hearing loss, ear ringing, sinus pressure, nosebleed, nasal discharge, sore throat, oral sores, tooth pain, bleeding gums, hoarse voice, neck pain)      Cardiovascular: (HTN, chest pain, elevated cholesterol/lipids, palpitations, leg swelling, leg pain with walking)     Respiratory: (cough, wheezing, snoring, SOB with activity (dyspnea), SOB while lying flat (orthopnea), awakening with severe SOB (paroxysmal nocturnal dyspnea))     Gastrointestinal: (NVD, constipation, abdominal pain, bright red stools, black tarry stools, stool incontinence)     Genitourinary:  (pelvic pain, burning or frequency of urination, urinary urgency, blood in urine incomplete bladder emptying, urinary incontinence, STD; MEN: testicular pain or swelling, erectile dysfunction; WOMEN: LMP, heavy menstrual bleeding (menorrhagia), irregular periods, postmenopausal bleeding, menstrual pain (dymenorrhea, vaginal discharge)     Musculoskeletal: (bone pain/fracture, joint pain or swelling, musle pain)     Integumentary: (rashes, acne, non-healing sores, itching, breast lumps, breast pain, nipple discharge, hair loss)     Neurologic: (HA, muscle weakness, paresthesias (numbness, coldness, crawling or prickling), memory loss, seizure, dizziness)     Endocrine: (heat or cold intolerance, excessive thirst (polydipsia), excessive hunger (polyphagia))     Immune/Allergic: (hives, seasonal or environmental allergies, HIV exposure)     Hematologic/Lymphatic: (lymph node enlargement, easy bleeding or bruising)     History obtained via chart review and patient     PCP is No primary care provider on file. Current Meds:     Home Medications           Prior to Admission medications    Medication Sig Start Date End Date Taking?  Authorizing Provider   traZODone (DESYREL) 150 MG tablet TAKE 1 TABLET BY MOUTH NIGHTLY 10/10/22 1/8/23   Cris Alaniz MD   asenapine maleate (SAPHRIS) 2.5 MG SUBL sublingual tablet Place 1 tablet under the tongue 2 times daily as needed (anxiety) 9/8/22 10/8/22   Cris Alaniz MD FLUoxetine (PROZAC) 40 MG capsule Take 1 capsule by mouth daily 6/17/22 7/17/22   Janie Ugalde MD   FLUoxetine (PROZAC) 20 MG capsule Take 1 capsule by mouth daily 6/9/22     Janie Ugalde MD   vitamin D (ERGOCALCIFEROL) 1.25 MG (14382 UT) CAPS capsule Take 1 capsule by mouth once a week 10/14/21     Sohail Rucker MD            MSE:  Appearance: Casually dressed  Behavior: Calm, cooperative, smiling somewhat superficially  Speech: Normal in tone, volume, and quality. No slurring, dysarthria or pressured speech noted. Mood: \"Good\"   Affect: Superficially bright  Thought Process: Appears linear, logical and goal oriented. Causality appears intact. Thought Content: Denies active suicidal and homicidal ideations. No overt delusions or paranoia appreciated. Perceptions: Denies auditory or visual hallucinations at present time. Not responding to internal stimuli. Concentration: Intact. Orientation: to person, place, date, and situation. Language: Intact. Fund of information: Intact. Memory: Recent and remote appear intact. Impulsivity: Limited. Neurovegitative: Fair appetite and sleep. Insight: Fair. Judgment: Fair.               Lab Results   Component Value Date      10/05/2021     K 4.1 10/05/2021      10/05/2021     CO2 24 10/05/2021     BUN 10 10/05/2021     CREATININE 0.9 10/05/2021     GLUCOSE 108 10/05/2021     CALCIUM 8.7 (L) 10/05/2021     PROT 6.1 (L) 10/05/2021     LABALBU 4.1 10/05/2021     BILITOT <0.2 10/05/2021     ALKPHOS 55 10/05/2021     AST 15 10/05/2021     ALT 16 10/05/2021     LABGLOM >60 10/05/2021     GFRAA >59 10/05/2021            Lab Results   Component Value Date/Time      10/05/2021 10:09 AM     K 4.1 10/05/2021 10:09 AM     K 3.6 02/21/2021 11:32 AM      10/05/2021 10:09 AM     CO2 24 10/05/2021 10:09 AM     BUN 10 10/05/2021 10:09 AM     CREATININE 0.9 10/05/2021 10:09 AM     GLUCOSE 108 10/05/2021 10:09 AM     CALCIUM 8.7 10/05/2021 10:09 AM            Lab Results   Component Value Date     CHOL 228 (H) 10/07/2021            Lab Results   Component Value Date     TRIG 167 (H) 10/07/2021            Lab Results   Component Value Date     HDL 53 (L) 10/07/2021            Lab Results   Component Value Date     LDLCALC 142 10/07/2021      No results found for: LABVLDL, VLDL  No results found for: [facility]        Lab Results   Component Value Date     LABA1C 5.7 10/07/2021      No results found for: EAG        Lab Results   Component Value Date     TSHFT4 0.65 02/23/2021            Lab Results   Component Value Date     VITD25 28.9 (L) 10/07/2021               Assessment:    1. Recurrent major depressive disorder, in remission ([facility] [address]    2. Anxiety    3. Chronic post-traumatic stress disorder (PTSD)             No evidence of acute suicidality, homicidality or psychosis observed. Psychiatrically stable and doing well. Plan:  1. Continue current regimen. The risks, benefits, side effects, indications, contraindications, and adverse effects of the medications have been discussed. Yes.  2. The pt has verbalized understanding and has capacity to give informed consent. 3. The [facility] report has been reviewed according to [facility] regulations. 4. Supportive therapy offered. 5. Follow up:    Return in about 6 months (around 4/11/2023). 6. The patient has been advised to call with any problems. 7. Controlled substance Treatment Plan: NA  8. The above listed medications have been continued, modifications in meds and other orders/labs as follows:                 Encounter Medications    No orders of the defined types were placed in this encounter. No orders of the defined types were placed in this encounter.         9. Additional comments:            10.Over 50% of the total visit time of 30  minutes was spent on counseling and/or coordination of care of:                         1. Recurrent major depressive disorder, in remission ([facility]    2. Anxiety    3.  Chronic post-traumatic stress disorder (PTSD)                   Padmini Brown MD

## 2023-01-04 ENCOUNTER — TELEPHONE (OUTPATIENT)
Dept: PSYCHIATRY | Age: 68
End: 2023-01-04

## 2023-01-04 NOTE — TELEPHONE ENCOUNTER
Pt called back to reschedule the appt on 04/11/23 with Dr. Axel Almeida because she will be out of the office that day. Rescheduled for 07/03/23 @ 2.     Electronically signed by Sangeetha Ocasio MA on 1/4/2023 at 3:46 PM

## 2023-01-04 NOTE — TELEPHONE ENCOUNTER
Called pt to cancel/reschedule appt for 04/11/23 with Dr. Chata Serrano because the provider will not be in the office that day. No answer and and LVM.     Electronically signed by Omaira Dubose MA on 1/4/2023 at 3:38 PM

## 2023-01-13 ENCOUNTER — TELEPHONE (OUTPATIENT)
Dept: PSYCHIATRY | Age: 68
End: 2023-01-13

## 2023-01-13 RX ORDER — ASENAPINE MALEATE 2.5 MG/1
TABLET SUBLINGUAL
Qty: 60 TABLET | Refills: 3 | Status: SHIPPED | OUTPATIENT
Start: 2023-01-13

## 2023-01-13 NOTE — TELEPHONE ENCOUNTER
Pharmacy sent med refill request below. Last office visit : 10/11/2022 with Dr Gerard Myers  Next office visit : 7/3/2023 with Dr Jim Goldstein    Requested Prescriptions     Pending Prescriptions Disp Refills    asenapine maleate (SAPHRIS) 2.5 MG SUBL sublingual tablet [Pharmacy Med Name: ASENAPINE 2.5 MG TABLET SL] 60 tablet 3     Sig: TAKE 1 TABLET BY MOUTH UNDER THE TONGUE TWICE DAILY            Edgar Bundy RN       Office Visit  10/11/2022  [address] [facility]  Renetta Hernandez MD  Psychiatry Recurrent major depressive disorder, in remission ([facility] [address] +2 more  Dx Medication Check  Reason for Visit     Progress Notes  Renetta Hernandez MD (Physician)   Psychiatry  Expand All Collapse All  Bryan Marie Case is a 78 y.o. female evaluated via telephone on 10/11/2022. Bryan Marie Case 1955                               Chief Complaint   Patient presents with    Medication Check               Subjective:       60-year-old white female with history of depression, PTSD and anxiety presents for follow. Patient is on Prozac, Trazodone. Reports compliance. Patient is calm and cooperative on the phone. Smiling. \"Doing good. Emotionless at times\". She denies suicidal ideation. Sleeping well but wakes up early. Good appetite. Spending time with her son and grandchildren. Clear of drug charges. States daughter-in-law is still in the house. Son released from care home today. Concentration still poor. Staying sober. Spending a lot of time with pets. Current Substance Use: Denies alcohol use. Denies drug use. Denies tobacco use.      BP: /75   Pulse 87   Temp 98.2 °F (36.8 °C)   Ht 5' 5\" (1.651 m)   Wt 135 lb 9.6 oz (61.5 kg)   LMP  (LMP Unknown)   SpO2 96%   BMI 22.57 kg/m²         Review of Systems - 14 point review:  Negative except     Constitutional: (fevers, chills, night sweats, wt loss/gain, change in appetite, fatigue, somnolence)     HEENT: (ear pain or discharge, hearing loss, ear ringing, sinus pressure, nosebleed, nasal discharge, sore throat, oral sores, tooth pain, bleeding gums, hoarse voice, neck pain)      Cardiovascular: (HTN, chest pain, elevated cholesterol/lipids, palpitations, leg swelling, leg pain with walking)     Respiratory: (cough, wheezing, snoring, SOB with activity (dyspnea), SOB while lying flat (orthopnea), awakening with severe SOB (paroxysmal nocturnal dyspnea))     Gastrointestinal: (NVD, constipation, abdominal pain, bright red stools, black tarry stools, stool incontinence)     Genitourinary:  (pelvic pain, burning or frequency of urination, urinary urgency, blood in urine incomplete bladder emptying, urinary incontinence, STD; MEN: testicular pain or swelling, erectile dysfunction; WOMEN: LMP, heavy menstrual bleeding (menorrhagia), irregular periods, postmenopausal bleeding, menstrual pain (dymenorrhea, vaginal discharge)     Musculoskeletal: (bone pain/fracture, joint pain or swelling, musle pain)     Integumentary: (rashes, acne, non-healing sores, itching, breast lumps, breast pain, nipple discharge, hair loss)     Neurologic: (HA, muscle weakness, paresthesias (numbness, coldness, crawling or prickling), memory loss, seizure, dizziness)     Endocrine: (heat or cold intolerance, excessive thirst (polydipsia), excessive hunger (polyphagia))     Immune/Allergic: (hives, seasonal or environmental allergies, HIV exposure)     Hematologic/Lymphatic: (lymph node enlargement, easy bleeding or bruising)     History obtained via chart review and patient     PCP is No primary care provider on file. Current Meds:     Home Medications           Prior to Admission medications    Medication Sig Start Date End Date Taking?  Authorizing Provider   traZODone (DESYREL) 150 MG tablet TAKE 1 TABLET BY MOUTH NIGHTLY 10/10/22 1/8/23   Elizabeth Sutton MD   asenapine maleate (SAPHRIS) 2.5 MG SUBL sublingual tablet Place 1 tablet under the tongue 2 times daily as needed (anxiety) 9/8/22 10/8/22   Janie Ugalde MD   FLUoxetine (PROZAC) 40 MG capsule Take 1 capsule by mouth daily 6/17/22 7/17/22   Janie Ugalde MD   FLUoxetine (PROZAC) 20 MG capsule Take 1 capsule by mouth daily 6/9/22     Janie Ugalde MD   vitamin D (ERGOCALCIFEROL) 1.25 MG (31038 UT) CAPS capsule Take 1 capsule by mouth once a week 10/14/21     Sohail Rucker MD            MSE:  Appearance: Casually dressed  Behavior: Calm, cooperative, smiling somewhat superficially  Speech: Normal in tone, volume, and quality. No slurring, dysarthria or pressured speech noted. Mood: \"Good\"   Affect: Superficially bright  Thought Process: Appears linear, logical and goal oriented. Causality appears intact. Thought Content: Denies active suicidal and homicidal ideations. No overt delusions or paranoia appreciated. Perceptions: Denies auditory or visual hallucinations at present time. Not responding to internal stimuli. Concentration: Intact. Orientation: to person, place, date, and situation. Language: Intact. Fund of information: Intact. Memory: Recent and remote appear intact. Impulsivity: Limited. Neurovegitative: Fair appetite and sleep. Insight: Fair. Judgment: Fair.               Lab Results   Component Value Date      10/05/2021     K 4.1 10/05/2021      10/05/2021     CO2 24 10/05/2021     BUN 10 10/05/2021     CREATININE 0.9 10/05/2021     GLUCOSE 108 10/05/2021     CALCIUM 8.7 (L) 10/05/2021     PROT 6.1 (L) 10/05/2021     LABALBU 4.1 10/05/2021     BILITOT <0.2 10/05/2021     ALKPHOS 55 10/05/2021     AST 15 10/05/2021     ALT 16 10/05/2021     LABGLOM >60 10/05/2021     GFRAA >59 10/05/2021            Lab Results   Component Value Date/Time      10/05/2021 10:09 AM     K 4.1 10/05/2021 10:09 AM     K 3.6 02/21/2021 11:32 AM      10/05/2021 10:09 AM     CO2 24 10/05/2021 10:09 AM     BUN 10 10/05/2021 10:09 AM     CREATININE 0.9 10/05/2021 10:09 AM     GLUCOSE 108 10/05/2021 10:09 AM     CALCIUM 8.7 10/05/2021 10:09 AM            Lab Results   Component Value Date     CHOL 228 (H) 10/07/2021            Lab Results   Component Value Date     TRIG 167 (H) 10/07/2021            Lab Results   Component Value Date     HDL 53 (L) 10/07/2021            Lab Results   Component Value Date     LDLCALC 142 10/07/2021      No results found for: LABVLDL, VLDL  No results found for: [facility]        Lab Results   Component Value Date     LABA1C 5.7 10/07/2021      No results found for: EAG        Lab Results   Component Value Date     TSHFT4 0.65 02/23/2021            Lab Results   Component Value Date     VITD25 28.9 (L) 10/07/2021               Assessment:    1. Recurrent major depressive disorder, in remission ([facility] [address]    2. Anxiety    3. Chronic post-traumatic stress disorder (PTSD)             No evidence of acute suicidality, homicidality or psychosis observed. Psychiatrically stable and doing well. Plan:  1. Continue current regimen. The risks, benefits, side effects, indications, contraindications, and adverse effects of the medications have been discussed. Yes.  2. The pt has verbalized understanding and has capacity to give informed consent. 3. The [place] report has been reviewed according to [facility] regulations. 4. Supportive therapy offered. 5. Follow up:    Return in about 6 months (around 4/11/2023). 6. The patient has been advised to call with any problems. 7. Controlled substance Treatment Plan: NA  8. The above listed medications have been continued, modifications in meds and other orders/labs as follows:                 Encounter Medications    No orders of the defined types were placed in this encounter. No orders of the defined types were placed in this encounter.         9. Additional comments:            10.Over 50% of the total visit time of 30  minutes was spent on counseling and/or coordination of care of:                         1. [facility]    2. Anxiety    3.  Chronic post-traumatic stress disorder (PTSD)                   Vi Payton MD medication change been

## 2023-01-13 NOTE — TELEPHONE ENCOUNTER
Attempted to contact pt to inform her that refill RX for Saphris had been sent to her pharmacy per [facility]-no answer and no release to leave a VM.

## 2023-02-01 ENCOUNTER — TELEPHONE (OUTPATIENT)
Dept: PSYCHIATRY | Age: 68
End: 2023-02-01

## 2023-02-01 RX ORDER — FLUOXETINE HYDROCHLORIDE 40 MG/1
CAPSULE ORAL
Qty: 90 CAPSULE | Refills: 3 | Status: SHIPPED | OUTPATIENT
Start: 2023-02-01 | End: 2023-05-02

## 2023-02-01 NOTE — TELEPHONE ENCOUNTER
[facility] sent fax requesting [address] [facility]. (Last RX sent 12/27/22 #30 with 3 refills)    Last office visit : 10/11/2022 with Dr Deysi Wei  Next office visit : 7/3/2023 with Dr Deysi Wei    Requested Prescriptions     Pending Prescriptions Disp Refills    FLUoxetine (PROZAC) 40 MG capsule 90 capsule 0     Sig: TAKE 1 CAPSULE BY MOUTH EVERY DAY            Vanessa Schmidt RN         Office Visit  10/11/2022  [address] [facility]  Elsie Calle MD  Psychiatry Recurrent major depressive disorder, in remission ([facility] [address] +2 more  Dx Medication Check  Reason for Visit     Progress Notes  Elsie Calle MD (Physician)   Psychiatry  Expand All Collapse All  Gladys Rogers Case is a 78 y.o. female evaluated via telephone on 10/11/2022. Gladys Rogers Case 1955                               Chief Complaint   Patient presents with    Medication Check               Subjective:       62-year-old white female with history of depression, PTSD and anxiety presents for follow. Patient is on Prozac, Trazodone. Reports compliance. Patient is calm and cooperative on the phone. Smiling. \"Doing good. Emotionless at times\". She denies suicidal ideation. Sleeping well but wakes up early. Good appetite. Spending time with her son and grandchildren. Clear of drug charges. States daughter-in-law is still in the house. Son released from [facility] today. Concentration still poor. Staying sober. Spending a lot of time with pets. Current Substance Use: Denies alcohol use. Denies drug use. Denies tobacco use.      BP: /75   Pulse 87   Temp 98.2 °F (36.8 °C)   Ht 5' 5\" (1.651 m)   Wt 135 lb 9.6 oz (61.5 kg)   LMP  (LMP Unknown)   SpO2 96%   BMI 22.57 kg/m²         Review of Systems - 14 point review:  Negative except     Constitutional: (fevers, chills, night sweats, wt loss/gain, change in appetite, fatigue, somnolence)     HEENT: (ear pain or discharge, hearing loss, ear ringing, sinus pressure, nosebleed, nasal discharge, sore throat, oral sores, tooth pain, bleeding gums, hoarse voice, neck pain)      Cardiovascular: (HTN, chest pain, elevated cholesterol/lipids, palpitations, leg swelling, leg pain with walking)     Respiratory: (cough, wheezing, snoring, SOB with activity (dyspnea), SOB while lying flat (orthopnea), awakening with severe SOB (paroxysmal nocturnal dyspnea))     Gastrointestinal: (NVD, constipation, abdominal pain, bright red stools, black tarry stools, stool incontinence)     Genitourinary:  (pelvic pain, burning or frequency of urination, urinary urgency, blood in urine incomplete bladder emptying, urinary incontinence, STD; MEN: testicular pain or swelling, erectile dysfunction; WOMEN: LMP, heavy menstrual bleeding (menorrhagia), irregular periods, postmenopausal bleeding, menstrual pain (dymenorrhea, vaginal discharge)     Musculoskeletal: (bone pain/fracture, joint pain or swelling, musle pain)     Integumentary: (rashes, acne, non-healing sores, itching, breast lumps, breast pain, nipple discharge, hair loss)     Neurologic: (HA, muscle weakness, paresthesias (numbness, coldness, crawling or prickling), memory loss, seizure, dizziness)     Endocrine: (heat or cold intolerance, excessive thirst (polydipsia), excessive hunger (polyphagia))     Immune/Allergic: (hives, seasonal or environmental allergies, HIV exposure)     Hematologic/Lymphatic: (lymph node enlargement, easy bleeding or bruising)     History obtained via chart review and patient     PCP is No primary care provider on file. Current Meds:     Home Medications           Prior to Admission medications    Medication Sig Start Date End Date Taking?  Authorizing Provider   traZODone (DESYREL) 150 MG tablet TAKE 1 TABLET BY MOUTH NIGHTLY 10/10/22 1/8/23   Ifeanyi Nichols MD   asenapine maleate (SAPHRIS) 2.5 MG SUBL sublingual tablet Place 1 tablet under the tongue 2 times daily as needed (anxiety) 9/8/22 10/8/22   Ifeanyi Nichols MD FLUoxetine (PROZAC) 40 MG capsule Take 1 capsule by mouth daily 6/17/22 7/17/22   Kalpana Muñoz MD   FLUoxetine (PROZAC) 20 MG capsule Take 1 capsule by mouth daily 6/9/22     Kalpana Muñoz MD   vitamin D (ERGOCALCIFEROL) 1.25 MG (75054 UT) CAPS capsule Take 1 capsule by mouth once a week 10/14/21     Johann Acosta MD            MSE:  Appearance: Casually dressed  Behavior: Calm, cooperative, smiling somewhat superficially  Speech: Normal in tone, volume, and quality. No slurring, dysarthria or pressured speech noted. Mood: \"Good\"   Affect: Superficially bright  Thought Process: Appears linear, logical and goal oriented. Causality appears intact. Thought Content: Denies active suicidal and homicidal ideations. No overt delusions or paranoia appreciated. Perceptions: Denies auditory or visual hallucinations at present time. Not responding to internal stimuli. Concentration: Intact. Orientation: to person, place, date, and situation. Language: Intact. Fund of information: Intact. Memory: Recent and remote appear intact. Impulsivity: Limited. Neurovegitative: Fair appetite and sleep. Insight: Fair. Judgment: Fair.               Lab Results   Component Value Date      10/05/2021     K 4.1 10/05/2021      10/05/2021     CO2 24 10/05/2021     BUN 10 10/05/2021     CREATININE 0.9 10/05/2021     GLUCOSE 108 10/05/2021     CALCIUM 8.7 (L) 10/05/2021     PROT 6.1 (L) 10/05/2021     LABALBU 4.1 10/05/2021     BILITOT <0.2 10/05/2021     ALKPHOS 55 10/05/2021     AST 15 10/05/2021     ALT 16 10/05/2021     LABGLOM >60 10/05/2021     GFRAA >59 10/05/2021            Lab Results   Component Value Date/Time      10/05/2021 10:09 AM     K 4.1 10/05/2021 10:09 AM     K 3.6 02/21/2021 11:32 AM      10/05/2021 10:09 AM     CO2 24 10/05/2021 10:09 AM     BUN 10 10/05/2021 10:09 AM     CREATININE 0.9 10/05/2021 10:09 AM     GLUCOSE 108 10/05/2021 10:09 AM     CALCIUM 8.7 10/05/2021 10:09 AM            Lab Results   Component Value Date     CHOL 228 (H) 10/07/2021            Lab Results   Component Value Date     TRIG 167 (H) 10/07/2021            Lab Results   Component Value Date     HDL 53 (L) 10/07/2021            Lab Results   Component Value Date     LDLCALC 142 10/07/2021      No results found for: LABVLDL, VLDL  No results found for: CHOLHDLRATIO        Lab Results   Component Value Date     LABA1C 5.7 10/07/2021      No results found for: EAG        Lab Results   Component Value Date     TSHFT4 0.65 02/23/2021            Lab Results   Component Value Date     VITD25 28.9 (L) 10/07/2021               Assessment:    1. Recurrent major depressive disorder, in remission (HCC)    2. Anxiety    3. Chronic post-traumatic stress disorder (PTSD)             No evidence of acute suicidality, homicidality or psychosis observed.  Psychiatrically stable and doing well.     Plan:  1.  Continue current regimen.  The risks, benefits, side effects, indications, contraindications, and adverse effects of the medications have been discussed. Yes.  2. The pt has verbalized understanding and has capacity to give informed consent.  3. The [place] report has been reviewed according to [employer] regulations.  4. Supportive therapy offered.    5. Follow up:    Return in about 6 months (around 4/11/2023).     6. The patient has been advised to call with any problems.  7. Controlled substance Treatment Plan: NA  8. The above listed medications have been continued, modifications in meds and other orders/labs as follows:                 Encounter Medications    No orders of the defined types were placed in this encounter.                    No orders of the defined types were placed in this encounter.        9. Additional comments:            10.Over 50% of the total visit time of 30  minutes was spent on counseling and/or coordination of care of:                         1. Recurrent major depressive [facility], [facility]    2. Anxiety    3.  Chronic post-traumatic stress disorder (PTSD)                   Jurgen Sosa MD medication change been

## 2023-05-30 ENCOUNTER — HOSPITAL ENCOUNTER (EMERGENCY)
Facility: HOSPITAL | Age: 68
Discharge: HOME | End: 2023-05-30
Attending: FAMILY MEDICINE | Admitting: FAMILY MEDICINE
Payer: COMMERCIAL

## 2023-05-30 VITALS
HEART RATE: 61 BPM | OXYGEN SATURATION: 96 % | SYSTOLIC BLOOD PRESSURE: 128 MMHG | BODY MASS INDEX: 21.66 KG/M2 | TEMPERATURE: 98.4 F | DIASTOLIC BLOOD PRESSURE: 73 MMHG | HEIGHT: 65 IN | WEIGHT: 130 LBS | RESPIRATION RATE: 16 BRPM

## 2023-05-30 DIAGNOSIS — M54.50 ACUTE LOW BACK PAIN WITHOUT SCIATICA, UNSPECIFIED BACK PAIN LATERALITY: Primary | ICD-10-CM

## 2023-05-30 PROCEDURE — 99283 EMERGENCY DEPT VISIT LOW MDM: CPT

## 2023-05-30 PROCEDURE — 25010000002 ORPHENADRINE CITRATE PER 60 MG: Performed by: FAMILY MEDICINE

## 2023-05-30 PROCEDURE — 96372 THER/PROPH/DIAG INJ SC/IM: CPT

## 2023-05-30 RX ORDER — METHOCARBAMOL 500 MG/1
500 TABLET, FILM COATED ORAL 4 TIMES DAILY PRN
Qty: 30 TABLET | Refills: 0 | Status: SHIPPED | OUTPATIENT
Start: 2023-05-30 | End: 2023-06-08

## 2023-05-30 RX ORDER — ORPHENADRINE CITRATE 30 MG/ML
60 INJECTION INTRAMUSCULAR; INTRAVENOUS ONCE
Status: COMPLETED | OUTPATIENT
Start: 2023-05-30 | End: 2023-05-30

## 2023-05-30 RX ORDER — KETOROLAC TROMETHAMINE 10 MG/1
10 TABLET, FILM COATED ORAL EVERY 6 HOURS PRN
Qty: 20 TABLET | Refills: 0 | Status: SHIPPED | OUTPATIENT
Start: 2023-05-30 | End: 2023-06-04

## 2023-05-30 RX ADMIN — ORPHENADRINE CITRATE 60 MG: 60 INJECTION INTRAMUSCULAR; INTRAVENOUS at 22:04

## 2023-05-31 NOTE — ED PROVIDER NOTES
Subjective   History of Present Illness  67-year-old female states that she was trying to get her dog out from underneath the shed.  Patient states that she was shoveling some dirt free the dog.  Patient states she felt some pain in her back after she did that.  Patient has no bowel or bladder incontinence.  Patient has no saddle numbness.  Patient no nausea vomiting.  Patient denied any other symptoms at this time.  Patient states that she has tried putting a lidocaine patch on the side of her back where it is hurting her.    Review of Systems   Musculoskeletal:  Positive for back pain.   All other systems reviewed and are negative.    Past Medical History:   Diagnosis Date    Ankle fracture     Anxiety     Arthritis     Depression     Elevated cholesterol     Migraine     Nasal fracture     PONV (postoperative nausea and vomiting)     PTSD (post-traumatic stress disorder)     Smoker     Vision loss     Wrist fracture     right wrist       No Known Allergies    Past Surgical History:   Procedure Laterality Date    BREAST AUGMENTATION       SECTION      x 4     HYSTERECTOMY      NASAL FRACTURE CLOSED REDUCTION N/A 2018    Procedure: closed nasal reduction with stabilization  2) open nasal septal fracture repair  ;  Surgeon: Darryl Benites MD;  [facility];  Service: ENT    TONSILLECTOMY         No family history on file.    Social History     Socioeconomic History    Marital status:    Tobacco Use    Smoking status: Some Days     Packs/day: 0.25     Types: Cigarettes    Smokeless tobacco: Never    Tobacco comments:     2-3 cigarettes a day.   Substance and Sexual Activity    Alcohol use: Yes     Comment: OCCASIONAL    Drug use: No    Sexual activity: Defer           Objective   Physical Exam  Vitals and nursing note reviewed.   Constitutional:       Appearance: Normal appearance.   HENT:      Head: Normocephalic and atraumatic.   Eyes:      Extraocular Movements: Extraocular movements  intact.      Pupils: Pupils are equal, round, and reactive to light.   Cardiovascular:      Rate and Rhythm: Normal rate and regular rhythm.      Heart sounds: Normal heart sounds.   Pulmonary:      Effort: Pulmonary effort is normal.      Breath sounds: Normal breath sounds.   Musculoskeletal:      Lumbar back: Tenderness present.   Skin:     General: Skin is warm and dry.   Neurological:      General: No focal deficit present.      Mental Status: She is alert and oriented to person, place, and time.   Psychiatric:         Mood and Affect: Mood normal.         Behavior: Behavior normal.       Procedures           ED Course                                           Medical Decision Making  67-year-old female appears to have some musculoskeletal pain on her lumbar back.  Patient has no spinal midline tenderness.  Patient has no step-offs.  Patient will be given Norflex here in the emergency room.  Patient be discharged home on Toradol and Robaxin.  Patient has been advised to follow-up primary care provider.  Patient has been advised to return to emergency room with new or worsening symptoms.  All questions were answered patient prior to discharge.  Patient verbalized understanding was agreeable to plan as discussed.    Problems Addressed:  Acute low back pain without sciatica, unspecified back pain laterality: complicated acute illness or injury    Risk  Prescription drug management.        Final diagnoses:   Acute low back pain without sciatica, unspecified back pain laterality       ED Disposition  ED Disposition       ED Disposition   Discharge    Condition   Stable    Comment   --               PATIENT CONNECTION - [facility] [address]  [phone]        [facility]  [address] [place] [id number] [phone]    As needed, If symptoms worsen         Medication List        New Prescriptions      ketorolac 10 MG tablet  Commonly known as:  TORADOL  Take 1 tablet by mouth Every 6 (Six) Hours As Needed for Mild Pain or Moderate Pain for up to 5 days.     methocarbamol 500 MG tablet  Commonly known as: ROBAXIN  Take 1 tablet by mouth 4 (Four) Times a Day As Needed for Muscle Spasms.               Where to Get Your Medications        These medications were sent to [facility]/pharmacy #[id number] - [place], [place] - [address] AT ACROSS FROM [address]  [phone] [facility] [phone] [facility] [address] [place] [address]      Phone: [phone]   ketorolac 10 MG tablet  methocarbamol 500 MG tablet            Go Germain MD  05/30/23 [id number]

## 2023-06-06 RX ORDER — ASENAPINE MALEATE 2.5 MG/1
TABLET SUBLINGUAL
Qty: 60 TABLET | Refills: 3 | Status: SHIPPED | OUTPATIENT
Start: 2023-06-06

## 2023-06-06 NOTE — TELEPHONE ENCOUNTER
Pharmacy sent a request to refill pt's medication       Last office visit : 10/11/2022 Dennison Jeans MD  Next office visit : 7/3/2023 Dennison Jeans MD    Requested Prescriptions     Pending Prescriptions Disp Refills    asenapine maleate (SAPHRIS) 2.5 MG SUBL sublingual tablet [Pharmacy Med Name: ASENAPINE 2.5 MG TABLET SL] 60 tablet 3     Sig: PLACE 1 TABLET UNDER THE TONGUE TWICE A DAY            Timothy Thomas     10/11/2022  [address] [facility]  Christi Hanson MD  Psychiatry Recurrent major depressive disorder, in remission ([facility] [address] +2 more  Dx Medication Check  Reason for Visit     Progress Notes  Christi Hanson MD (Physician)   Psychiatry  Expand All Collapse All  Rodrigue Valentine Case is a 78 y.o. female evaluated via telephone on 10/11/2022. Rodrigue Valentine Case 1955                               Chief Complaint   Patient presents with    Medication Check               Subjective:       78-year-old white female with history of depression, PTSD and anxiety presents for follow. Patient is on Prozac, Trazodone. Reports compliance. Patient is calm and cooperative on the phone. Smiling. \"Doing good. Emotionless at times\". She denies suicidal ideation. Sleeping well but wakes up early. Good appetite. Spending time with her son and grandchildren. Clear of drug charges. States daughter-in-law is still in the house. Son released from [facility] today. Concentration still poor. Staying sober. Spending a lot of time with pets. Current Substance Use: Denies alcohol use. Denies drug use. Denies tobacco use.      BP: /75   Pulse 87   Temp 98.2 °F (36.8 °C)   Ht 5' 5\" (1.651 m)   Wt 135 lb 9.6 oz (61.5 kg)   LMP  (LMP Unknown)   SpO2 96%   BMI 22.57 kg/m²         Review of Systems - 14 point review:  Negative except     Constitutional: (fevers, chills, night sweats, wt loss/gain, change in appetite, fatigue, somnolence)     HEENT: (ear pain or discharge, hearing loss, ear ringing, sinus

## 2023-06-08 ENCOUNTER — HOSPITAL ENCOUNTER (OUTPATIENT)
Dept: GENERAL RADIOLOGY | Facility: HOSPITAL | Age: 68
Discharge: HOME | End: 2023-06-08
Payer: COMMERCIAL

## 2023-06-08 ENCOUNTER — OFFICE VISIT (OUTPATIENT)
Dept: INTERNAL MEDICINE | Facility: CLINIC | Age: 68
End: 2023-06-08
Payer: COMMERCIAL

## 2023-06-08 VITALS
HEIGHT: 65 IN | DIASTOLIC BLOOD PRESSURE: 84 MMHG | OXYGEN SATURATION: 97 % | SYSTOLIC BLOOD PRESSURE: 114 MMHG | WEIGHT: 131.38 LBS | HEART RATE: 105 BPM | BODY MASS INDEX: 21.89 KG/M2 | TEMPERATURE: 97.3 F

## 2023-06-08 DIAGNOSIS — Z76.89 ENCOUNTER TO ESTABLISH CARE: Primary | ICD-10-CM

## 2023-06-08 DIAGNOSIS — Z12.4 ENCOUNTER FOR PAPANICOLAOU SMEAR OF CERVIX: ICD-10-CM

## 2023-06-08 DIAGNOSIS — R10.31 PAIN, ABDOMINAL, RLQ: ICD-10-CM

## 2023-06-08 DIAGNOSIS — M54.50 ACUTE RIGHT-SIDED LOW BACK PAIN WITHOUT SCIATICA: ICD-10-CM

## 2023-06-08 DIAGNOSIS — R10.2 PELVIC PAIN: ICD-10-CM

## 2023-06-08 LAB
BILIRUB BLD-MCNC: NEGATIVE MG/DL
CLARITY, POC: ABNORMAL
COLOR UR: ABNORMAL
GLUCOSE UR STRIP-MCNC: NEGATIVE MG/DL
KETONES UR QL: NEGATIVE
LEUKOCYTE EST, POC: NEGATIVE
NITRITE UR-MCNC: NEGATIVE MG/ML
PH UR: 6 [PH] (ref 5–8)
PROT UR STRIP-MCNC: ABNORMAL MG/DL
RBC # UR STRIP: NEGATIVE /UL
SP GR UR: 1.02 (ref 1–1.03)
UROBILINOGEN UR QL: ABNORMAL

## 2023-06-08 PROCEDURE — 72100 X-RAY EXAM L-S SPINE 2/3 VWS: CPT

## 2023-06-08 RX ORDER — LIDOCAINE 50 MG/G
1 PATCH TOPICAL EVERY 24 HOURS
Qty: 30 EACH | Refills: 3 | Status: SHIPPED | OUTPATIENT
Start: 2023-06-08

## 2023-06-08 RX ORDER — QUETIAPINE FUMARATE 50 MG
TABLET ORAL
COMMUNITY
Start: 2022-12-01

## 2023-06-08 RX ORDER — CYCLOBENZAPRINE HCL 5 MG
5 TABLET ORAL 3 TIMES DAILY PRN
Qty: 45 TABLET | Refills: 0 | Status: SHIPPED | OUTPATIENT
Start: 2023-06-08

## 2023-06-08 RX ORDER — ASENAPINE MALEATE 2.5 MG/1
TABLET SUBLINGUAL
COMMUNITY
Start: 2023-05-02
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
## 2023-06-08 NOTE — PROGRESS NOTES
Please inform her that the x-ray of the lumbar spine is fairly normal with no acute abnormality.  I would recommend continuing to treat the pain as she has been, may consider referral to physical therapy if she is interested if it does not improve over the next week.  I have placed order for CT scan of abdomen pelvis for further investigation of the right lower quadrant pain.  Of note x-ray does mention the presence of a gallstone that is measuring a little over a centimeter.

## 2023-06-09 LAB
ALBUMIN SERPL-MCNC: 5 G/DL (ref 3.5–5.2)
ALBUMIN/GLOB SERPL: 2.2 G/DL
ALP SERPL-CCNC: 95 U/L (ref 39–117)
ALT SERPL-CCNC: 53 U/L (ref 1–33)
AST SERPL-CCNC: 37 U/L (ref 1–32)
BASOPHILS # BLD AUTO: 0.04 10*3/MM3 (ref 0–0.2)
BASOPHILS NFR BLD AUTO: 0.5 % (ref 0–1.5)
BILIRUB SERPL-MCNC: <0.2 MG/DL (ref 0–1.2)
BUN SERPL-MCNC: 17 MG/DL (ref 8–23)
BUN/CREAT SERPL: 16.2 (ref 7–25)
CALCIUM SERPL-MCNC: 10.1 MG/DL (ref 8.6–10.5)
CHLORIDE SERPL-SCNC: 99 MMOL/L (ref 98–107)
CO2 SERPL-SCNC: 26.8 MMOL/L (ref 22–29)
CREAT SERPL-MCNC: 1.05 MG/DL (ref 0.57–1)
EGFRCR SERPLBLD CKD-EPI 2021: 58.7 ML/MIN/1.73
EOSINOPHIL # BLD AUTO: 0.04 10*3/MM3 (ref 0–0.4)
EOSINOPHIL NFR BLD AUTO: 0.5 % (ref 0.3–6.2)
ERYTHROCYTE [DISTWIDTH] IN BLOOD BY AUTOMATED COUNT: 13.1 % (ref 12.3–15.4)
GLOBULIN SER CALC-MCNC: 2.3 GM/DL
GLUCOSE SERPL-MCNC: 95 MG/DL (ref 65–99)
HCT VFR BLD AUTO: 43.7 % (ref 34–46.6)
HGB BLD-MCNC: 14.5 G/DL (ref 12–15.9)
IMM GRANULOCYTES # BLD AUTO: 0.02 10*3/MM3 (ref 0–0.05)
IMM GRANULOCYTES NFR BLD AUTO: 0.2 % (ref 0–0.5)
LYMPHOCYTES # BLD AUTO: 2.24 10*3/MM3 (ref 0.7–3.1)
LYMPHOCYTES NFR BLD AUTO: 26.9 % (ref 19.6–45.3)
MCH RBC QN AUTO: 28.7 PG (ref 26.6–33)
MCHC RBC AUTO-ENTMCNC: 33.2 G/DL (ref 31.5–35.7)
MCV RBC AUTO: 86.5 FL (ref 79–97)
MONOCYTES # BLD AUTO: 0.34 10*3/MM3 (ref 0.1–0.9)
MONOCYTES NFR BLD AUTO: 4.1 % (ref 5–12)
NEUTROPHILS # BLD AUTO: 5.64 10*3/MM3 (ref 1.7–7)
NEUTROPHILS NFR BLD AUTO: 67.8 % (ref 42.7–76)
NRBC BLD AUTO-RTO: 0 /100 WBC (ref 0–0.2)
PLATELET # BLD AUTO: 390 10*3/MM3 (ref 140–450)
POTASSIUM SERPL-SCNC: 4.4 MMOL/L (ref 3.5–5.2)
PROT SERPL-MCNC: 7.3 G/DL (ref 6–8.5)
RBC # BLD AUTO: 5.05 10*6/MM3 (ref 3.77–5.28)
SODIUM SERPL-SCNC: 139 MMOL/L (ref 136–145)
WBC # BLD AUTO: 8.32 10*3/MM3 (ref 3.4–10.8)

## 2023-06-09 NOTE — PROGRESS NOTES
CBC is unremarkable, no elevation in white blood cell count, red blood cell count is normal, platelets are normal.  Comprehensive metabolic panel is significant for impaired renal function, looking back a couple years ago there is slight increase in creatinine at that time, we can reevaluate this in a month or 2 but if renal function stays the same likely has chronic kidney disease.  Things that can be done to help decrease continued impairment include drinking adequate amounts of water which include 64 ounces a day, avoiding excessive use of Tylenol, NSAIDs, avoiding alcohol.  All electrolytes were normal range, liver enzymes were also elevated, this could be related to medications, fatty liver, or could be an indicator of some hepatic biliary dysfunction.  Recent x-ray did mention the gallstone.  May consider work-up for this but would rather proceed with the CT scan that has already been ordered of abdomen and pelvis for now.

## 2023-06-14 LAB
CONV .: NORMAL
CYTOLOGIST CVX/VAG CYTO: NORMAL
CYTOLOGY CVX/VAG DOC CYTO: NORMAL
CYTOLOGY CVX/VAG DOC THIN PREP: NORMAL
DX ICD CODE: NORMAL
HIV 1 & 2 AB SER-IMP: NORMAL
OTHER STN SPEC: NORMAL
STAT OF ADQ CVX/VAG CYTO-IMP: NORMAL

## 2023-06-15 ENCOUNTER — OFFICE VISIT (OUTPATIENT)
Dept: INTERNAL MEDICINE | Facility: CLINIC | Age: 68
End: 2023-06-15
Payer: COMMERCIAL

## 2023-06-15 VITALS
BODY MASS INDEX: 22.49 KG/M2 | SYSTOLIC BLOOD PRESSURE: 98 MMHG | WEIGHT: 135 LBS | HEART RATE: 87 BPM | TEMPERATURE: 97.3 F | HEIGHT: 65 IN | DIASTOLIC BLOOD PRESSURE: 54 MMHG | OXYGEN SATURATION: 96 %

## 2023-06-15 DIAGNOSIS — R79.89 ELEVATED LFTS: ICD-10-CM

## 2023-06-15 DIAGNOSIS — M54.50 ACUTE RIGHT-SIDED LOW BACK PAIN WITHOUT SCIATICA: Primary | ICD-10-CM

## 2023-06-15 DIAGNOSIS — N18.31 STAGE 3A CHRONIC KIDNEY DISEASE: ICD-10-CM

## 2023-06-15 DIAGNOSIS — R10.31 PAIN, ABDOMINAL, RLQ: ICD-10-CM

## 2023-06-15 DIAGNOSIS — E55.9 VITAMIN D INSUFFICIENCY: ICD-10-CM

## 2023-06-15 DIAGNOSIS — R10.2 PELVIC PAIN: ICD-10-CM

## 2023-06-15 DIAGNOSIS — E78.41 ELEVATED LIPOPROTEIN(A): ICD-10-CM

## 2023-06-15 DIAGNOSIS — F41.8 DEPRESSION WITH ANXIETY: ICD-10-CM

## 2023-06-15 PROCEDURE — 99214 OFFICE O/P EST MOD 30 MIN: CPT

## 2023-06-15 NOTE — PROGRESS NOTES
Subjective   Gloria Case is a 67 y.o. female.   Chief Complaint   Patient presents with    Follow-up     1 week f/u        Gloria presents to the office today for 1 week follow-up of acute right-sided low back pain, right lower quadrant abdominal pain, pelvic pain.  She reports that overall her symptoms have improved significantly, states that she is still having some intermittent pelvic cramping but has not had any bleeding.  She states that the lower back pain has improved significantly, she states that she has been cautious with her range of motion, has been avoiding activities that make the pain worse.  She is also concerned about the idea of having chronic kidney disease, she states that she has been looking up all the food that she eats online and is unsure of what is appropriate for her to eat now.  She has been incorporating water into her daily routine now.  Reports that typically for her she would drink mostly just coffee and Diet Coke.  She states that she has not had Diet Coke now in 3 days.  She denies overuse of NSAIDs, will typically utilize Tylenol.  She reports that the CT scan has been scheduled for next week    The following portions of the patient's history were reviewed and updated as appropriate: allergies, current medications, past family history, past medical history, past social history, past surgical history and problem list.    Review of Systems   Constitutional:  Negative for fever and unexpected weight loss.   Cardiovascular:  Negative for chest pain.   Genitourinary:  Positive for pelvic pain. Negative for dysuria and urinary incontinence.   Musculoskeletal:  Positive for back pain.   Neurological:  Negative for weakness and numbness.     Objective   Past Medical History:   Diagnosis Date    ADHD (attention deficit hyperactivity disorder) 1990    Dr Du Packer diagnosed me    Ankle fracture     Anxiety     Arthritis     Depression     Elevated cholesterol     Migraine     Nasal fracture   "   PONV (postoperative nausea and vomiting)     PTSD (post-traumatic stress disorder)     Smoker     Vision loss     Wrist fracture     right wrist      Past Surgical History:   Procedure Laterality Date    ADENOIDECTOMY  1960    BREAST AUGMENTATION       SECTION      x 4     FRACTURE SURGERY  2018    Nose    HYSTERECTOMY      NASAL FRACTURE CLOSED REDUCTION N/A 2018    Procedure: closed nasal reduction with stabilization  2) open nasal septal fracture repair  ;  Surgeon: Darryl Benites MD;  Location: [facility];  Service: ENT    TONSILLECTOMY      TUBAL ABDOMINAL LIGATION  84        Current Outpatient Medications:     Asenapine Maleate 2.5 MG sublingual tablet, PLACE 1 TABLET UNDER THE TONGUE TWICE A DAY, Disp: , Rfl:     cyclobenzaprine (FLEXERIL) 5 MG tablet, Take 1 tablet by mouth 3 (Three) Times a Day As Needed for Muscle Spasms., Disp: 45 tablet, Rfl: 0    SEROquel 50 MG tablet, , Disp: , Rfl:     traZODone (DESYREL) 150 MG tablet, Take 1 tablet by mouth Every Night., Disp: , Rfl:     lidocaine (LIDODERM) 5 %, Place 1 patch on the skin as directed by provider Daily. Remove & Discard patch within 12 hours or as directed by MD (Patient not taking: Reported on 6/15/2023), Disp: 30 each, Rfl: 3      BP 98/54 (BP Location: Right arm, Patient Position: Sitting, Cuff Size: Adult)   Pulse 87   Temp 97.3 °F (36.3 °C) (Temporal)   Ht 165.1 cm (65\")   Wt 61.2 kg (135 lb)   SpO2 96%   BMI 22.47 kg/m²      Body mass index is 22.47 kg/m².  BMI is within normal parameters. No other follow-up for BMI required.       Physical Exam  Vitals and nursing note reviewed.   Constitutional:       General: She is not in acute distress.     Appearance: Normal appearance. She is normal weight. She is not ill-appearing, toxic-appearing or diaphoretic.   HENT:      Head: Normocephalic and atraumatic.   Eyes:      Pupils: Pupils are equal, round, and reactive to light.   Cardiovascular:      Rate and Rhythm: " Normal rate and regular rhythm.      Pulses: Normal pulses.      Heart sounds: Normal heart sounds.   Pulmonary:      Effort: Pulmonary effort is normal.      Breath sounds: Normal breath sounds.   Abdominal:      General: Bowel sounds are normal. There is no distension.      Palpations: Abdomen is soft.      Tenderness: There is no abdominal tenderness. There is no guarding.   Musculoskeletal:         General: Normal range of motion.      Cervical back: Normal range of motion and neck supple.   Skin:     General: Skin is warm and dry.   Neurological:      General: No focal deficit present.      Mental Status: She is alert and oriented to person, place, and time. Mental status is at baseline.   Psychiatric:         Mood and Affect: Mood normal.         Behavior: Behavior normal.         Thought Content: Thought content normal.         Judgment: Judgment normal.             Assessment & Plan   Diagnoses and all orders for this visit:    1. Acute right-sided low back pain without sciatica (Primary)    2. Pain, abdominal, RLQ    3. Pelvic pain    4. Stage 3a chronic kidney disease  -     Comprehensive Metabolic Panel; Future  -     CBC & Differential; Future  -     Urinalysis With Microscopic - Urine, Clean Catch; Future  -     Microalbumin / Creatinine Urine Ratio - Urine, Clean Catch; Future    5. Elevated LFTs  -     Comprehensive Metabolic Panel; Future    6. Depression with anxiety  -     TSH; Future    7. Elevated lipoprotein(a)  -     Lipid Panel; Future    8. Vitamin D insufficiency  -     Vitamin D,25-Hydroxy; Future               Plan of care reviewed with Gloria  Low back pain is improving well, continue with current measures  Right lower quadrant pain has improved still having some intermittent pelvic cramping, we reviewed her lab results and Pap results, unremarkable Pap results, proceed with CT scan of abdomen pelvis just to be safe.  We did have long discussion about the results of the comprehensive  metabolic panel which revealed GFR 58.7, AST 37, ALT 53.  I advised that the initial measures we are going to take is to include water as the main source of hydration for her, it was recommended to incorporate around 64 ounces daily and she is working hard to do so.  I advised that it is okay for her to have the occasional Diet Coke as well as to still occasionally eat her sweets that she really likes but she needs to start making some positive diet changes by incorporating some more healthy nutrient dense foods as well.  We are going to schedule her for an annual wellness visit in 4 weeks at which time we will reevaluate a CMP and we will add on a urine microalbumin/creatinine ratio.  Back at her history she has previously had GFR is less than 60 which does indicate chronic kidney disease.  I advised against any new medications at this point time however did encourage her to utilize medications like Tylenol versus NSAIDs and to ensure that her psychiatrist is aware of this for potential influence on future psychiatric medications.  Return 4 weeks, prior to this as needed.      Please note that portions of this note were completed with a voice recognition program.     Electronically signed by NAHUM Ybarra, 06/15/23, 16:23 CDT.

## 2023-06-23 ENCOUNTER — TELEPHONE (OUTPATIENT)
Dept: PSYCHIATRY | Age: 68
End: 2023-06-23

## 2023-06-23 NOTE — TELEPHONE ENCOUNTER
Pt signed a release and then given letter per Dr Esther Porter requesting her to be excused from jury duty.

## 2023-07-03 ENCOUNTER — OFFICE VISIT (OUTPATIENT)
Dept: PSYCHIATRY | Age: 68
End: 2023-07-03

## 2023-07-03 ENCOUNTER — TELEPHONE (OUTPATIENT)
Dept: PSYCHIATRY | Age: 68
End: 2023-07-03

## 2023-07-03 VITALS
BODY MASS INDEX: 22.26 KG/M2 | SYSTOLIC BLOOD PRESSURE: 111 MMHG | TEMPERATURE: 98.2 F | HEART RATE: 99 BPM | OXYGEN SATURATION: 93 % | WEIGHT: 133.6 LBS | DIASTOLIC BLOOD PRESSURE: 77 MMHG | HEIGHT: 65 IN

## 2023-07-03 DIAGNOSIS — F41.9 ANXIETY: ICD-10-CM

## 2023-07-03 DIAGNOSIS — F33.40 RECURRENT MAJOR DEPRESSIVE DISORDER, IN REMISSION (HCC): Primary | ICD-10-CM

## 2023-07-03 RX ORDER — TRAZODONE HYDROCHLORIDE 150 MG/1
150 TABLET ORAL NIGHTLY
Qty: 90 TABLET | Refills: 2 | Status: SHIPPED | OUTPATIENT
Start: 2023-07-03 | End: 2023-10-01

## 2023-07-03 ASSESSMENT — PATIENT HEALTH QUESTIONNAIRE - PHQ9
SUM OF ALL RESPONSES TO PHQ QUESTIONS 1-9: 5
SUM OF ALL RESPONSES TO PHQ QUESTIONS 1-9: 5
3. TROUBLE FALLING OR STAYING ASLEEP: 1
1. LITTLE INTEREST OR PLEASURE IN DOING THINGS: 1
9. THOUGHTS THAT YOU WOULD BE BETTER OFF DEAD, OR OF HURTING YOURSELF: 0
6. FEELING BAD ABOUT YOURSELF - OR THAT YOU ARE A FAILURE OR HAVE LET YOURSELF OR YOUR FAMILY DOWN: 0
5. POOR APPETITE OR OVEREATING: 0
2. FEELING DOWN, DEPRESSED OR HOPELESS: 1
8. MOVING OR SPEAKING SO SLOWLY THAT OTHER PEOPLE COULD HAVE NOTICED. OR THE OPPOSITE, BEING SO FIGETY OR RESTLESS THAT YOU HAVE BEEN MOVING AROUND A LOT MORE THAN USUAL: 0
SUM OF ALL RESPONSES TO PHQ9 QUESTIONS 1 & 2: 2
4. FEELING TIRED OR HAVING LITTLE ENERGY: 1
10. IF YOU CHECKED OFF ANY PROBLEMS, HOW DIFFICULT HAVE THESE PROBLEMS MADE IT FOR YOU TO DO YOUR WORK, TAKE CARE OF THINGS AT HOME, OR GET ALONG WITH OTHER PEOPLE: 1
SUM OF ALL RESPONSES TO PHQ QUESTIONS 1-9: 5
7. TROUBLE CONCENTRATING ON THINGS, SUCH AS READING THE NEWSPAPER OR WATCHING TELEVISION: 1
SUM OF ALL RESPONSES TO PHQ QUESTIONS 1-9: 5

## 2023-07-03 NOTE — TELEPHONE ENCOUNTER
10:09 AM            Lab Results   Component Value Date     CHOL 228 (H) 10/07/2021            Lab Results   Component Value Date     TRIG 167 (H) 10/07/2021            Lab Results   Component Value Date     HDL 53 (L) 10/07/2021            Lab Results   Component Value Date     LDLCALC 142 10/07/2021      No results found for: LABVLDL, VLDL  No results found for: [facility]        Lab Results   Component Value Date     LABA1C 5.7 10/07/2021      No results found for: EAG        Lab Results   Component Value Date     TSHFT4 0.65 02/23/2021            Lab Results   Component Value Date     VITD25 28.9 (L) 10/07/2021               Assessment:    1. Recurrent major depressive disorder, in remission ([address])    2. Anxiety    3. Chronic post-traumatic stress disorder (PTSD)             No evidence of acute suicidality, homicidality or psychosis observed. Psychiatrically stable and doing well. Plan:  1. Continue current regimen. The risks, benefits, side effects, indications, contraindications, and adverse effects of the medications have been discussed. Yes.  2. The pt has verbalized understanding and has capacity to give informed consent. 3. The [facility] report has been reviewed according to [facility] regulations. 4. Supportive therapy offered. 5. Follow up:    Return in about 6 months (around 4/11/2023). 6. The patient has been advised to call with any problems. 7. Controlled substance Treatment Plan: NA  8. The above listed medications have been continued, modifications in meds and other orders/labs as follows:                 Encounter Medications    No orders of the defined types were placed in this encounter. No orders of the defined types were placed in this encounter.         9. Additional comments:            10.Over 50% of the total visit time of 30  minutes was spent on counseling and/or coordination of care of:                         1. Recurrent major depressive disorder, in

## 2023-07-06 ENCOUNTER — TELEPHONE (OUTPATIENT)
Dept: PSYCHIATRY | Age: 68
End: 2023-07-06

## 2023-07-06 NOTE — TELEPHONE ENCOUNTER
Called pt on 06/30/23 for appointment reminder for 07/03/23.     -Pt confirmed      Electronically signed by Maddison Yañez MA on 7/6/2023 at 9:08 AM

## 2023-08-03 ENCOUNTER — OFFICE VISIT (OUTPATIENT)
Dept: INTERNAL MEDICINE | Facility: CLINIC | Age: 68
End: 2023-08-03
Payer: MEDICARE

## 2023-08-03 VITALS
HEART RATE: 93 BPM | BODY MASS INDEX: 22.02 KG/M2 | OXYGEN SATURATION: 95 % | HEIGHT: 65 IN | WEIGHT: 132.2 LBS | TEMPERATURE: 98 F | DIASTOLIC BLOOD PRESSURE: 60 MMHG | SYSTOLIC BLOOD PRESSURE: 98 MMHG

## 2023-08-03 DIAGNOSIS — F51.01 PRIMARY INSOMNIA: ICD-10-CM

## 2023-08-03 DIAGNOSIS — N18.31 STAGE 3A CHRONIC KIDNEY DISEASE: ICD-10-CM

## 2023-08-03 DIAGNOSIS — F41.8 DEPRESSION WITH ANXIETY: ICD-10-CM

## 2023-08-03 DIAGNOSIS — Z12.11 ENCOUNTER FOR SCREENING FOR MALIGNANT NEOPLASM OF COLON: ICD-10-CM

## 2023-08-03 DIAGNOSIS — E78.41 ELEVATED LIPOPROTEIN(A): ICD-10-CM

## 2023-08-03 DIAGNOSIS — E55.9 VITAMIN D INSUFFICIENCY: ICD-10-CM

## 2023-08-03 DIAGNOSIS — Z23 NEED FOR PNEUMOCOCCAL 20-VALENT CONJUGATE VACCINATION: ICD-10-CM

## 2023-08-03 DIAGNOSIS — M25.532 LEFT WRIST PAIN: ICD-10-CM

## 2023-08-03 DIAGNOSIS — Z78.0 POSTMENOPAUSE: ICD-10-CM

## 2023-08-03 DIAGNOSIS — M79.642 LEFT HAND PAIN: ICD-10-CM

## 2023-08-03 DIAGNOSIS — Z00.00 INITIAL MEDICARE ANNUAL WELLNESS VISIT: ICD-10-CM

## 2023-08-03 DIAGNOSIS — Z12.31 ENCOUNTER FOR SCREENING MAMMOGRAM FOR MALIGNANT NEOPLASM OF BREAST: Primary | ICD-10-CM

## 2023-08-03 DIAGNOSIS — R29.898 LEFT HAND WEAKNESS: ICD-10-CM

## 2023-08-03 PROBLEM — E87.6 HYPOKALEMIA: Status: RESOLVED | Noted: 2021-01-18 | Resolved: 2023-08-03

## 2023-08-03 RX ORDER — TEA TREE OIL 100 %
1 OIL (ML) TOPICAL DAILY
Qty: 90 CAPSULE | Refills: 3 | Status: SHIPPED | OUTPATIENT
Start: 2023-08-03

## 2023-08-03 RX ORDER — ATORVASTATIN CALCIUM 10 MG/1
10 TABLET, FILM COATED ORAL DAILY
Qty: 90 TABLET | Refills: 3 | Status: SHIPPED | OUTPATIENT
Start: 2023-08-03

## 2023-08-03 NOTE — TELEPHONE ENCOUNTER
Lab Results   Component Value Date     LDLCALC 142 10/07/2021      No results found for: LABVLDL, VLDL  No results found for: [facility]        Lab Results   Component Value Date     LABA1C 5.7 10/07/2021      No results found for: EAG        Lab Results   Component Value Date     TSHFT4 0.65 02/23/2021            Lab Results   Component Value Date     VITD25 28.9 (L) 10/07/2021               Assessment:    1. Recurrent major depressive disorder, in remission ([address])    2. Anxiety          No evidence of acute suicidality, homicidality or psychosis observed. Psychiatrically stable and doing well. Plan:  1.  DC Prozac. The risks, benefits, side effects, indications, contraindications, and adverse effects of the medications have been discussed. Yes.  2. The pt has verbalized understanding and has capacity to give informed consent. 3. The [facility] report has been reviewed according to [facility] regulations. 4. Supportive therapy offered. 5. Follow up:    No follow-ups on file. 6. The patient has been advised to call with any problems. 7. Controlled substance Treatment Plan: NA  8. The above listed medications have been continued, modifications in meds and other orders/labs as follows:                   Encounter Medications    No orders of the defined types were placed in this encounter. No orders of the defined types were placed in this encounter. 9. Additional comments:            10.Over 50% of the total visit time of 31  minutes was spent on counseling and/or coordination of care of:                         1. Recurrent major depressive disorder, in remission ([address])    2.  Miguelangel Alba MD

## 2023-08-05 RX ORDER — QUETIAPINE FUMARATE 50 MG/1
TABLET, FILM COATED ORAL
Qty: 90 TABLET | Refills: 3 | Status: SHIPPED | OUTPATIENT
Start: 2023-08-05

## 2023-08-10 LAB
NCCN CRITERIA FLAG: NORMAL
TYRER CUZICK SCORE: 6

## 2023-08-15 ENCOUNTER — HOSPITAL ENCOUNTER (OUTPATIENT)
Dept: MAMMOGRAPHY | Facility: HOSPITAL | Age: 68
Discharge: HOME | End: 2023-08-15
Payer: MEDICARE

## 2023-08-15 ENCOUNTER — HOSPITAL ENCOUNTER (OUTPATIENT)
Dept: BONE DENSITY | Facility: HOSPITAL | Age: 68
Discharge: HOME | End: 2023-08-15
Payer: MEDICARE

## 2023-08-15 DIAGNOSIS — Z12.31 ENCOUNTER FOR SCREENING MAMMOGRAM FOR MALIGNANT NEOPLASM OF BREAST: ICD-10-CM

## 2023-08-15 DIAGNOSIS — Z78.0 POSTMENOPAUSE: ICD-10-CM

## 2023-08-15 PROCEDURE — 77063 BREAST TOMOSYNTHESIS BI: CPT

## 2023-08-15 PROCEDURE — 77067 SCR MAMMO BI INCL CAD: CPT

## 2023-08-15 PROCEDURE — 77080 DXA BONE DENSITY AXIAL: CPT

## 2023-08-15 NOTE — PROGRESS NOTES
Bone density scan shows signs of osteoporosis which indicates significant increased fracture risk, I would recommend Prolia injections, if she is interested or once more information please send her that, if she wishes to proceed please get her signed up for it.  Continue with current vitamin D supplementation, would also recommend incorporating a 500 mg calcium tablet over-the-counter, continue with weightbearing exercises as tolerated and fall prevention measures.

## 2023-09-11 ENCOUNTER — TELEPHONE (OUTPATIENT)
Dept: NEUROLOGY | Facility: HOSPITAL | Age: 68
End: 2023-09-11

## 2023-09-12 ENCOUNTER — HOSPITAL ENCOUNTER (OUTPATIENT)
Dept: NEUROLOGY | Facility: HOSPITAL | Age: 68
Discharge: HOME | End: 2023-09-12
Payer: MEDICARE

## 2023-09-12 DIAGNOSIS — M79.642 LEFT HAND PAIN: ICD-10-CM

## 2023-09-12 DIAGNOSIS — M25.532 LEFT WRIST PAIN: ICD-10-CM

## 2023-09-12 DIAGNOSIS — R29.898 LEFT HAND WEAKNESS: ICD-10-CM

## 2023-09-12 PROCEDURE — 95909 NRV CNDJ TST 5-6 STUDIES: CPT

## 2023-09-12 PROCEDURE — 95886 MUSC TEST DONE W/N TEST COMP: CPT

## 2023-10-20 RX ORDER — ASENAPINE MALEATE 2.5 MG/1
TABLET SUBLINGUAL
Qty: 60 TABLET | Refills: 2 | Status: SHIPPED | OUTPATIENT
Start: 2023-10-20

## 2023-11-15 PROBLEM — M81.0 OSTEOPOROSIS: Status: ACTIVE | Noted: 2023-11-15

## 2023-11-16 ENCOUNTER — TRANSCRIBE ORDERS (OUTPATIENT)
Dept: LAB | Facility: HOSPITAL | Age: 68
End: 2023-11-16
Payer: MEDICARE

## 2023-11-16 DIAGNOSIS — M81.0 SENILE OSTEOPOROSIS: Primary | ICD-10-CM

## 2023-11-20 ENCOUNTER — TELEPHONE (OUTPATIENT)
Dept: PSYCHIATRY | Age: 68
End: 2023-11-20

## 2023-11-20 NOTE — TELEPHONE ENCOUNTER
Pt called on 11/17/23 stating that she got a letter for jury duty and wanted Dr Shikha Lopez to write a letter stating that she was not able to be on jury duty at this time or in the future. Pt was informed on 11/17/23 that Dr Shikha Lopez agreed to the letter and it would be ready for  on Monday 11/20/23. Called pt this am and informed her the letter was ready for . She stated that her son lost her car key and asked could it be mailed to her. Discussed that the letter may not get to her on time per mail and that the letter could be faxed to the court house if she would call back with a fax number-pt stated that she would try that. She has not called back-tried to reach her x 2 this afternoon-no answer-left VM with request for call back.

## 2023-11-21 ENCOUNTER — TELEPHONE (OUTPATIENT)
Dept: PSYCHIATRY | Age: 68
End: 2023-11-21

## 2023-11-21 NOTE — TELEPHONE ENCOUNTER
Pt called stating that she will  her letter for jury duty .     Electronically signed by Rolf Fay on 11/21/2023 at 4:30 PM

## 2023-11-23 ENCOUNTER — HOSPITAL ENCOUNTER (INPATIENT)
Age: 68
LOS: 4 days | Discharge: HOME | DRG: 885 | End: 2023-11-27
Attending: EMERGENCY MEDICINE | Admitting: PSYCHIATRY & NEUROLOGY
Payer: MEDICARE

## 2023-11-23 DIAGNOSIS — R45.851 DEPRESSION WITH SUICIDAL IDEATION: Primary | ICD-10-CM

## 2023-11-23 DIAGNOSIS — F32.A DEPRESSION WITH SUICIDAL IDEATION: Primary | ICD-10-CM

## 2023-11-23 LAB
ALBUMIN SERPL-MCNC: 4.4 G/DL (ref 3.5–5.2)
ALP SERPL-CCNC: 87 U/L (ref 35–104)
ALT SERPL-CCNC: 24 U/L (ref 5–33)
AMPHET UR QL SCN: NEGATIVE
ANION GAP SERPL CALCULATED.3IONS-SCNC: 11 MMOL/L (ref 7–19)
AST SERPL-CCNC: 20 U/L (ref 5–32)
BARBITURATES UR QL SCN: NEGATIVE
BASOPHILS # BLD: 0 K/UL (ref 0–0.2)
BASOPHILS NFR BLD: 0.7 % (ref 0–1)
BENZODIAZ UR QL SCN: NEGATIVE
BILIRUB SERPL-MCNC: <0.2 MG/DL (ref 0.2–1.2)
BUN SERPL-MCNC: 10 MG/DL (ref 8–23)
BUPRENORPHINE URINE: NEGATIVE
CALCIUM SERPL-MCNC: 9.3 MG/DL (ref 8.8–10.2)
CANNABINOIDS UR QL SCN: NEGATIVE
CHLORIDE SERPL-SCNC: 103 MMOL/L (ref 98–111)
CO2 SERPL-SCNC: 24 MMOL/L (ref 22–29)
COCAINE UR QL SCN: NEGATIVE
CREAT SERPL-MCNC: 0.9 MG/DL (ref 0.5–0.9)
DRUG SCREEN COMMENT UR-IMP: ABNORMAL
EOSINOPHIL # BLD: 0 K/UL (ref 0–0.6)
EOSINOPHIL NFR BLD: 0.5 % (ref 0–5)
ERYTHROCYTE [DISTWIDTH] IN BLOOD BY AUTOMATED COUNT: 12.3 % (ref 11.5–14.5)
ETHANOLAMINE SERPL-MCNC: <10 MG/DL (ref 0–0.08)
FENTANYL SCREEN, URINE: NEGATIVE
FLUAV AG NPH QL: NEGATIVE
FLUBV AG NPH QL: NEGATIVE
GLUCOSE SERPL-MCNC: 162 MG/DL (ref 74–109)
HCT VFR BLD AUTO: 40.5 % (ref 37–47)
HGB BLD-MCNC: 13.6 G/DL (ref 12–16)
IMM GRANULOCYTES # BLD: 0 K/UL
LYMPHOCYTES # BLD: 1.9 K/UL (ref 1.1–4.5)
LYMPHOCYTES NFR BLD: 33 % (ref 20–40)
MCH RBC QN AUTO: 28.6 PG (ref 27–31)
MCHC RBC AUTO-ENTMCNC: 33.6 G/DL (ref 33–37)
MCV RBC AUTO: 85.1 FL (ref 81–99)
METHADONE UR QL SCN: NEGATIVE
METHAMPHETAMINE, URINE: NEGATIVE
MONOCYTES # BLD: 0.2 K/UL (ref 0–0.9)
MONOCYTES NFR BLD: 3.9 % (ref 0–10)
NEUTROPHILS # BLD: 3.5 K/UL (ref 1.5–7.5)
NEUTS SEG NFR BLD: 61.7 % (ref 50–65)
OPIATES UR QL SCN: NEGATIVE
OXYCODONE UR QL SCN: NEGATIVE
PCP UR QL SCN: NEGATIVE
PLATELET # BLD AUTO: 306 K/UL (ref 130–400)
PMV BLD AUTO: 9.4 FL (ref 9.4–12.3)
POTASSIUM SERPL-SCNC: 3.9 MMOL/L (ref 3.5–5)
PROT SERPL-MCNC: 7.1 G/DL (ref 6.6–8.7)
RBC # BLD AUTO: 4.76 M/UL (ref 4.2–5.4)
SARS-COV-2 RDRP RESP QL NAA+PROBE: NOT DETECTED
SODIUM SERPL-SCNC: 138 MMOL/L (ref 136–145)
TRICYCLIC, URINE: POSITIVE
WBC # BLD AUTO: 5.7 K/UL (ref 4.8–10.8)

## 2023-11-23 PROCEDURE — 87804 INFLUENZA ASSAY W/OPTIC: CPT

## 2023-11-23 PROCEDURE — 6370000000 HC RX 637 (ALT 250 FOR IP): Performed by: PSYCHIATRY & NEUROLOGY

## 2023-11-23 PROCEDURE — 99285 EMERGENCY DEPT VISIT HI MDM: CPT

## 2023-11-23 PROCEDURE — G0480 DRUG TEST DEF 1-7 CLASSES: HCPCS

## 2023-11-23 PROCEDURE — 82077 ASSAY SPEC XCP UR&BREATH IA: CPT

## 2023-11-23 PROCEDURE — 36415 COLL VENOUS BLD VENIPUNCTURE: CPT

## 2023-11-23 PROCEDURE — 80307 DRUG TEST PRSMV CHEM ANLYZR: CPT

## 2023-11-23 PROCEDURE — 80053 COMPREHEN METABOLIC PANEL: CPT

## 2023-11-23 PROCEDURE — 1240000000 HC EMOTIONAL WELLNESS R&B

## 2023-11-23 PROCEDURE — 85025 COMPLETE CBC W/AUTO DIFF WBC: CPT

## 2023-11-23 PROCEDURE — 87635 SARS-COV-2 COVID-19 AMP PRB: CPT

## 2023-11-23 RX ORDER — LANOLIN ALCOHOL/MO/W.PET/CERES
3 CREAM (GRAM) TOPICAL DAILY
Status: ON HOLD | COMMUNITY
End: 2023-11-27 | Stop reason: SDUPTHER

## 2023-11-23 RX ORDER — ATORVASTATIN CALCIUM 10 MG/1
10 TABLET, FILM COATED ORAL DAILY
Status: DISCONTINUED | OUTPATIENT
Start: 2023-11-23 | End: 2023-11-27 | Stop reason: HOSPADM

## 2023-11-23 RX ORDER — QUETIAPINE FUMARATE 50 MG/1
50 TABLET, FILM COATED ORAL NIGHTLY
Status: DISCONTINUED | OUTPATIENT
Start: 2023-11-23 | End: 2023-11-27 | Stop reason: HOSPADM

## 2023-11-23 RX ORDER — ACETAMINOPHEN 325 MG/1
650 TABLET ORAL EVERY 4 HOURS PRN
Status: DISCONTINUED | OUTPATIENT
Start: 2023-11-23 | End: 2023-11-27 | Stop reason: HOSPADM

## 2023-11-23 RX ORDER — ATORVASTATIN CALCIUM 10 MG/1
10 TABLET, FILM COATED ORAL DAILY
Status: ON HOLD | COMMUNITY
Start: 2023-11-03 | End: 2023-11-27 | Stop reason: SDUPTHER

## 2023-11-23 RX ORDER — POLYETHYLENE GLYCOL 3350 17 G/17G
17 POWDER, FOR SOLUTION ORAL DAILY PRN
Status: DISCONTINUED | OUTPATIENT
Start: 2023-11-23 | End: 2023-11-27 | Stop reason: HOSPADM

## 2023-11-23 RX ORDER — ASENAPINE MALEATE 2.5 MG/1
2.5 TABLET SUBLINGUAL 2 TIMES DAILY PRN
Status: DISCONTINUED | OUTPATIENT
Start: 2023-11-23 | End: 2023-11-27 | Stop reason: HOSPADM

## 2023-11-23 RX ORDER — TRAZODONE HYDROCHLORIDE 150 MG/1
150 TABLET ORAL NIGHTLY
Status: DISCONTINUED | OUTPATIENT
Start: 2023-11-23 | End: 2023-11-27 | Stop reason: HOSPADM

## 2023-11-23 RX ADMIN — ATORVASTATIN CALCIUM 10 MG: 10 TABLET, FILM COATED ORAL at 20:46

## 2023-11-23 RX ADMIN — TRAZODONE HYDROCHLORIDE 150 MG: 150 TABLET ORAL at 20:41

## 2023-11-23 RX ADMIN — QUETIAPINE FUMARATE 50 MG: 50 TABLET ORAL at 20:41

## 2023-11-23 SDOH — HEALTH STABILITY: PHYSICAL HEALTH: ON AVERAGE, HOW MANY DAYS PER WEEK DO YOU ENGAGE IN MODERATE TO STRENUOUS EXERCISE (LIKE A BRISK WALK)?: 0 DAYS

## 2023-11-23 SDOH — HEALTH STABILITY: PHYSICAL HEALTH: ON AVERAGE, HOW MANY MINUTES DO YOU ENGAGE IN EXERCISE AT THIS LEVEL?: 0 MIN

## 2023-11-23 ASSESSMENT — PATIENT HEALTH QUESTIONNAIRE - PHQ9
SUM OF ALL RESPONSES TO PHQ QUESTIONS 1-9: 18
5. POOR APPETITE OR OVEREATING: 1
SUM OF ALL RESPONSES TO PHQ QUESTIONS 1-9: 15
6. FEELING BAD ABOUT YOURSELF - OR THAT YOU ARE A FAILURE OR HAVE LET YOURSELF OR YOUR FAMILY DOWN: 1
2. FEELING DOWN, DEPRESSED OR HOPELESS: 3
1. LITTLE INTEREST OR PLEASURE IN DOING THINGS: 3
SUM OF ALL RESPONSES TO PHQ QUESTIONS 1-9: 18
SUM OF ALL RESPONSES TO PHQ9 QUESTIONS 1 & 2: 6
SUM OF ALL RESPONSES TO PHQ QUESTIONS 1-9: 18
4. FEELING TIRED OR HAVING LITTLE ENERGY: 3
10. IF YOU CHECKED OFF ANY PROBLEMS, HOW DIFFICULT HAVE THESE PROBLEMS MADE IT FOR YOU TO DO YOUR WORK, TAKE CARE OF THINGS AT HOME, OR GET ALONG WITH OTHER PEOPLE: 2
8. MOVING OR SPEAKING SO SLOWLY THAT OTHER PEOPLE COULD HAVE NOTICED. OR THE OPPOSITE, BEING SO FIGETY OR RESTLESS THAT YOU HAVE BEEN MOVING AROUND A LOT MORE THAN USUAL: 1
9. THOUGHTS THAT YOU WOULD BE BETTER OFF DEAD, OR OF HURTING YOURSELF: 3
3. TROUBLE FALLING OR STAYING ASLEEP: 2
7. TROUBLE CONCENTRATING ON THINGS, SUCH AS READING THE NEWSPAPER OR WATCHING TELEVISION: 1

## 2023-11-23 ASSESSMENT — SLEEP AND FATIGUE QUESTIONNAIRES
AVERAGE NUMBER OF SLEEP HOURS: 7
DO YOU HAVE DIFFICULTY SLEEPING: YES
DO YOU USE A SLEEP AID: YES

## 2023-11-23 ASSESSMENT — SOCIAL DETERMINANTS OF HEALTH (SDOH)
DO YOU BELONG TO ANY CLUBS OR ORGANIZATIONS SUCH AS CHURCH GROUPS UNIONS, FRATERNAL OR ATHLETIC GROUPS, OR SCHOOL GROUPS?: NO
WITHIN THE LAST YEAR, HAVE YOU BEEN AFRAID OF YOUR PARTNER OR EX-PARTNER?: NO
HOW OFTEN DO YOU ATTENT MEETINGS OF THE CLUB OR ORGANIZATION YOU BELONG TO?: NEVER
IN A TYPICAL WEEK, HOW MANY TIMES DO YOU TALK ON THE PHONE WITH FAMILY, FRIENDS, OR NEIGHBORS?: MORE THAN THREE TIMES A WEEK
WITHIN THE LAST YEAR, HAVE YOU BEEN KICKED, HIT, SLAPPED, OR OTHERWISE PHYSICALLY HURT BY YOUR PARTNER OR EX-PARTNER?: NO
HOW OFTEN DO YOU GET TOGETHER WITH FRIENDS OR RELATIVES?: ONCE A WEEK
HOW OFTEN DO YOU ATTEND CHURCH OR RELIGIOUS SERVICES?: NEVER
HOW HARD IS IT FOR YOU TO PAY FOR THE VERY BASICS LIKE FOOD, HOUSING, MEDICAL CARE, AND HEATING?: SOMEWHAT HARD
WITHIN THE LAST YEAR, HAVE TO BEEN RAPED OR FORCED TO HAVE ANY KIND OF SEXUAL ACTIVITY BY YOUR PARTNER OR EX-PARTNER?: NO
WITHIN THE LAST YEAR, HAVE YOU BEEN HUMILIATED OR EMOTIONALLY ABUSED IN OTHER WAYS BY YOUR PARTNER OR EX-PARTNER?: NO

## 2023-11-23 ASSESSMENT — LIFESTYLE VARIABLES
HOW OFTEN DO YOU HAVE A DRINK CONTAINING ALCOHOL: MONTHLY OR LESS
HOW MANY STANDARD DRINKS CONTAINING ALCOHOL DO YOU HAVE ON A TYPICAL DAY: 1 OR 2

## 2023-11-23 ASSESSMENT — ENCOUNTER SYMPTOMS
SHORTNESS OF BREATH: 0
NAUSEA: 0
RHINORRHEA: 0
COUGH: 0
ABDOMINAL PAIN: 0
BACK PAIN: 0
SORE THROAT: 0
VOMITING: 0
DIARRHEA: 0

## 2023-11-23 ASSESSMENT — PAIN - FUNCTIONAL ASSESSMENT: PAIN_FUNCTIONAL_ASSESSMENT: NONE - DENIES PAIN
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
## 2023-11-24 PROCEDURE — 99223 1ST HOSP IP/OBS HIGH 75: CPT | Performed by: PSYCHIATRY & NEUROLOGY

## 2023-11-24 PROCEDURE — 1240000000 HC EMOTIONAL WELLNESS R&B

## 2023-11-24 PROCEDURE — 6370000000 HC RX 637 (ALT 250 FOR IP): Performed by: PSYCHIATRY & NEUROLOGY

## 2023-11-24 RX ORDER — IBUPROFEN 600 MG/1
600 TABLET ORAL EVERY 6 HOURS PRN
Status: DISCONTINUED | OUTPATIENT
Start: 2023-11-24 | End: 2023-11-27 | Stop reason: HOSPADM

## 2023-11-24 RX ADMIN — ACETAMINOPHEN 650 MG: 325 TABLET ORAL at 14:15

## 2023-11-24 RX ADMIN — TRAZODONE HYDROCHLORIDE 150 MG: 150 TABLET ORAL at 21:29

## 2023-11-24 RX ADMIN — ACETAMINOPHEN 650 MG: 325 TABLET ORAL at 19:39

## 2023-11-24 RX ADMIN — ASENAPINE MALEATE 2.5 MG: 2.5 TABLET SUBLINGUAL at 21:29

## 2023-11-24 RX ADMIN — IBUPROFEN 600 MG: 600 TABLET ORAL at 15:32

## 2023-11-24 RX ADMIN — ASENAPINE MALEATE 2.5 MG: 2.5 TABLET SUBLINGUAL at 02:03

## 2023-11-24 RX ADMIN — ASENAPINE MALEATE 2.5 MG: 2.5 TABLET SUBLINGUAL at 12:38

## 2023-11-24 RX ADMIN — QUETIAPINE FUMARATE 50 MG: 50 TABLET ORAL at 21:29

## 2023-11-24 RX ADMIN — IBUPROFEN 600 MG: 600 TABLET ORAL at 21:29

## 2023-11-24 RX ADMIN — ATORVASTATIN CALCIUM 10 MG: 10 TABLET, FILM COATED ORAL at 08:39

## 2023-11-24 ASSESSMENT — PAIN - FUNCTIONAL ASSESSMENT
PAIN_FUNCTIONAL_ASSESSMENT: PREVENTS OR INTERFERES SOME ACTIVE ACTIVITIES AND ADLS
PAIN_FUNCTIONAL_ASSESSMENT: ACTIVITIES ARE NOT PREVENTED
PAIN_FUNCTIONAL_ASSESSMENT: PREVENTS OR INTERFERES SOME ACTIVE ACTIVITIES AND ADLS
PAIN_FUNCTIONAL_ASSESSMENT: ACTIVITIES ARE NOT PREVENTED

## 2023-11-24 ASSESSMENT — PAIN DESCRIPTION - DESCRIPTORS
DESCRIPTORS: ACHING
DESCRIPTORS: ACHING
DESCRIPTORS: ACHING;NAGGING
DESCRIPTORS: ACHING

## 2023-11-24 ASSESSMENT — PAIN DESCRIPTION - LOCATION
LOCATION: BACK

## 2023-11-24 ASSESSMENT — PAIN SCALES - GENERAL
PAINLEVEL_OUTOF10: 7
PAINLEVEL_OUTOF10: 8
PAINLEVEL_OUTOF10: 9
PAINLEVEL_OUTOF10: 6
PAINLEVEL_OUTOF10: 7

## 2023-11-24 ASSESSMENT — PAIN DESCRIPTION - FREQUENCY: FREQUENCY: CONTINUOUS

## 2023-11-24 ASSESSMENT — PAIN DESCRIPTION - PAIN TYPE: TYPE: CHRONIC PAIN

## 2023-11-24 ASSESSMENT — PAIN DESCRIPTION - ORIENTATION
ORIENTATION: MID;LOWER
ORIENTATION: MID
ORIENTATION: LEFT

## 2023-11-24 ASSESSMENT — PAIN DESCRIPTION - ONSET: ONSET: ON-GOING

## 2023-11-25 LAB
25(OH)D3 SERPL-MCNC: 22.2 NG/ML
HBA1C MFR BLD: 5.7 % (ref 4–6)
TSH SERPL DL<=0.005 MIU/L-ACNC: 1.65 UIU/ML (ref 0.35–5.5)
VIT B12 SERPL-MCNC: 630 PG/ML (ref 211–946)

## 2023-11-25 PROCEDURE — 36415 COLL VENOUS BLD VENIPUNCTURE: CPT

## 2023-11-25 PROCEDURE — 82306 VITAMIN D 25 HYDROXY: CPT

## 2023-11-25 PROCEDURE — 6370000000 HC RX 637 (ALT 250 FOR IP): Performed by: PSYCHIATRY & NEUROLOGY

## 2023-11-25 PROCEDURE — 99232 SBSQ HOSP IP/OBS MODERATE 35: CPT | Performed by: PSYCHIATRY & NEUROLOGY

## 2023-11-25 PROCEDURE — 1240000000 HC EMOTIONAL WELLNESS R&B

## 2023-11-25 PROCEDURE — 82607 VITAMIN B-12: CPT

## 2023-11-25 PROCEDURE — 83036 HEMOGLOBIN GLYCOSYLATED A1C: CPT

## 2023-11-25 PROCEDURE — 84443 ASSAY THYROID STIM HORMONE: CPT

## 2023-11-25 PROCEDURE — 6370000000 HC RX 637 (ALT 250 FOR IP): Performed by: FAMILY MEDICINE

## 2023-11-25 RX ORDER — LIDOCAINE 4 G/G
1 PATCH TOPICAL DAILY
Status: DISCONTINUED | OUTPATIENT
Start: 2023-11-25 | End: 2023-11-27

## 2023-11-25 RX ORDER — ERGOCALCIFEROL 1.25 MG/1
50000 CAPSULE ORAL WEEKLY
Status: DISCONTINUED | OUTPATIENT
Start: 2023-11-25 | End: 2023-11-27 | Stop reason: HOSPADM

## 2023-11-25 RX ORDER — GABAPENTIN 400 MG/1
400 CAPSULE ORAL ONCE
Status: COMPLETED | OUTPATIENT
Start: 2023-11-25 | End: 2023-11-25

## 2023-11-25 RX ADMIN — ERGOCALCIFEROL 50000 UNITS: 1.25 CAPSULE ORAL at 12:03

## 2023-11-25 RX ADMIN — ACETAMINOPHEN 650 MG: 325 TABLET ORAL at 16:20

## 2023-11-25 RX ADMIN — ACETAMINOPHEN 650 MG: 325 TABLET ORAL at 09:00

## 2023-11-25 RX ADMIN — IBUPROFEN 600 MG: 600 TABLET ORAL at 14:03

## 2023-11-25 RX ADMIN — ATORVASTATIN CALCIUM 10 MG: 10 TABLET, FILM COATED ORAL at 07:56

## 2023-11-25 RX ADMIN — TRAZODONE HYDROCHLORIDE 150 MG: 150 TABLET ORAL at 20:35

## 2023-11-25 RX ADMIN — ACETAMINOPHEN 650 MG: 325 TABLET ORAL at 20:35

## 2023-11-25 RX ADMIN — IBUPROFEN 600 MG: 600 TABLET ORAL at 07:58

## 2023-11-25 RX ADMIN — QUETIAPINE FUMARATE 50 MG: 50 TABLET ORAL at 20:36

## 2023-11-25 RX ADMIN — IBUPROFEN 600 MG: 600 TABLET ORAL at 19:57

## 2023-11-25 RX ADMIN — ASENAPINE MALEATE 2.5 MG: 2.5 TABLET SUBLINGUAL at 20:36

## 2023-11-25 RX ADMIN — GABAPENTIN 400 MG: 400 CAPSULE ORAL at 11:19

## 2023-11-25 RX ADMIN — ASENAPINE MALEATE 2.5 MG: 2.5 TABLET SUBLINGUAL at 09:16

## 2023-11-25 ASSESSMENT — PAIN SCALES - GENERAL
PAINLEVEL_OUTOF10: 6
PAINLEVEL_OUTOF10: 2
PAINLEVEL_OUTOF10: 6
PAINLEVEL_OUTOF10: 6
PAINLEVEL_OUTOF10: 5
PAINLEVEL_OUTOF10: 5
PAINLEVEL_OUTOF10: 0

## 2023-11-25 ASSESSMENT — PAIN DESCRIPTION - DESCRIPTORS
DESCRIPTORS: ACHING
DESCRIPTORS: ACHING;NAGGING
DESCRIPTORS: ACHING

## 2023-11-25 ASSESSMENT — PAIN DESCRIPTION - LOCATION
LOCATION: BACK

## 2023-11-25 ASSESSMENT — PAIN DESCRIPTION - ORIENTATION
ORIENTATION: MID;OUTER
ORIENTATION: LEFT;MID
ORIENTATION: MID
ORIENTATION: LEFT;MID
ORIENTATION: LEFT

## 2023-11-25 ASSESSMENT — PAIN - FUNCTIONAL ASSESSMENT
PAIN_FUNCTIONAL_ASSESSMENT: ACTIVITIES ARE NOT PREVENTED

## 2023-11-25 NOTE — GROUP NOTE
Group Therapy Note    Date: 11/25/2023    Group Start Time: 1445  Group End Time: 3939  Group Topic: Psychoeducation    [facility]    Nancyangela SANCHEZ           Patient's Goal: Emotional Wellness-Self Esteem    Notes: Pt's played a game where you role a colored dice and whatever color you land on, the leader reads a question in that color. All of the questions were about self-esteem and pt's shared life experiences that coincided with the topic. Status After Intervention:  Unchanged    Participation Level:  Active Listener and Interactive    Participation Quality: Appropriate, Attentive, and Sharing      Speech:  normal      Thought Process/Content: Logical      Affective Functioning: Congruent      Mood: euthymic      Level of consciousness:  Alert, Oriented x4, and Attentive      Response to Learning: Able to verbalize current knowledge/experience, Able to retain information, and Capable of insight      Endings: None Reported    Modes of Intervention: Support, Socialization, and Exploration      Discipline Responsible: Psychoeducational Specialist      Signature:  Renetta Vásquez

## 2023-11-25 NOTE — GROUP NOTE
Group Therapy Note    Date: 11/25/2023    Group Start Time: 1600  Group End Time: 36  Group Topic: Healthy Living/Wellness    [facility]    Debbie Cummins RN              Patient's Goal:  Socialization    Notes:        Status After Intervention:  Improved    Participation Level: Active Listener and Interactive    Participation Quality: Appropriate, Attentive, and Sharing      Speech:  normal      Thought Process/Content: Logical  Linear      Affective Functioning: Congruent      Mood: anxious and depressed      Level of consciousness:  Alert, Oriented x4, and Attentive      Response to Learning: Able to verbalize current knowledge/experience and Able to verbalize/acknowledge new learning      Endings: None Reported    Modes of Intervention: Education, Support, and Socialization      Discipline Responsible: Registered Nurse      Signature:   Debbie Cummins RN
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
## 2023-11-26 PROCEDURE — 6370000000 HC RX 637 (ALT 250 FOR IP): Performed by: PSYCHIATRY & NEUROLOGY

## 2023-11-26 PROCEDURE — 6370000000 HC RX 637 (ALT 250 FOR IP): Performed by: FAMILY MEDICINE

## 2023-11-26 PROCEDURE — 1240000000 HC EMOTIONAL WELLNESS R&B

## 2023-11-26 RX ORDER — GABAPENTIN 400 MG/1
400 CAPSULE ORAL 3 TIMES DAILY PRN
Status: DISCONTINUED | OUTPATIENT
Start: 2023-11-26 | End: 2023-11-27

## 2023-11-26 RX ADMIN — IBUPROFEN 600 MG: 600 TABLET ORAL at 07:35

## 2023-11-26 RX ADMIN — QUETIAPINE FUMARATE 50 MG: 50 TABLET ORAL at 20:57

## 2023-11-26 RX ADMIN — GABAPENTIN 400 MG: 400 CAPSULE ORAL at 20:56

## 2023-11-26 RX ADMIN — ACETAMINOPHEN 650 MG: 325 TABLET ORAL at 02:02

## 2023-11-26 RX ADMIN — IBUPROFEN 600 MG: 600 TABLET ORAL at 20:56

## 2023-11-26 RX ADMIN — ASENAPINE MALEATE 2.5 MG: 2.5 TABLET SUBLINGUAL at 06:52

## 2023-11-26 RX ADMIN — ACETAMINOPHEN 650 MG: 325 TABLET ORAL at 10:15

## 2023-11-26 RX ADMIN — ATORVASTATIN CALCIUM 10 MG: 10 TABLET, FILM COATED ORAL at 07:35

## 2023-11-26 RX ADMIN — ASENAPINE MALEATE 2.5 MG: 2.5 TABLET SUBLINGUAL at 16:59

## 2023-11-26 RX ADMIN — ACETAMINOPHEN 650 MG: 325 TABLET ORAL at 20:56

## 2023-11-26 RX ADMIN — GABAPENTIN 400 MG: 400 CAPSULE ORAL at 13:11

## 2023-11-26 RX ADMIN — TRAZODONE HYDROCHLORIDE 150 MG: 150 TABLET ORAL at 20:57

## 2023-11-26 RX ADMIN — ASENAPINE MALEATE 2.5 MG: 2.5 TABLET SUBLINGUAL at 20:56

## 2023-11-26 RX ADMIN — IBUPROFEN 600 MG: 600 TABLET ORAL at 13:13

## 2023-11-26 RX ADMIN — ACETAMINOPHEN 650 MG: 325 TABLET ORAL at 16:58

## 2023-11-26 ASSESSMENT — PAIN DESCRIPTION - DESCRIPTORS
DESCRIPTORS: ACHING;NAGGING
DESCRIPTORS: ACHING
DESCRIPTORS: ACHING;DULL

## 2023-11-26 ASSESSMENT — PAIN DESCRIPTION - LOCATION
LOCATION: BACK

## 2023-11-26 ASSESSMENT — PAIN - FUNCTIONAL ASSESSMENT
PAIN_FUNCTIONAL_ASSESSMENT: PREVENTS OR INTERFERES SOME ACTIVE ACTIVITIES AND ADLS
PAIN_FUNCTIONAL_ASSESSMENT: ACTIVITIES ARE NOT PREVENTED
PAIN_FUNCTIONAL_ASSESSMENT: PREVENTS OR INTERFERES SOME ACTIVE ACTIVITIES AND ADLS

## 2023-11-26 ASSESSMENT — PAIN SCALES - GENERAL
PAINLEVEL_OUTOF10: 5
PAINLEVEL_OUTOF10: 4
PAINLEVEL_OUTOF10: 9
PAINLEVEL_OUTOF10: 2
PAINLEVEL_OUTOF10: 8
PAINLEVEL_OUTOF10: 7
PAINLEVEL_OUTOF10: 4

## 2023-11-26 ASSESSMENT — PAIN DESCRIPTION - ORIENTATION
ORIENTATION: LEFT
ORIENTATION: LEFT;OUTER
ORIENTATION: LEFT
ORIENTATION: MID;OUTER
ORIENTATION: LEFT
ORIENTATION: LEFT

## 2023-11-26 NOTE — GROUP NOTE
Group Therapy Note    Date: 11/26/2023    Group Start Time: 0608  Group End Time: 1600  Group Topic: Psychoeducation    [facility]    Hasmukh Alhaji SANCHEZ           Patient's Goal: What is Assertiveness? Notes: Pt's discussed cognitive thinking distortions and how when you don't have self-love or any self-worth, people can truly sense that about you and then take advantage and use you for what they want. Pt's learned what being assertive means vs. aggressive or passive and how much you can achieve for yourself by being assertive. Status After Intervention:  Unchanged    Participation Level:  Active Listener and Interactive    Participation Quality: Appropriate and Attentive      Speech:  normal      Thought Process/Content: Logical      Affective Functioning: Congruent      Mood: euthymic      Level of consciousness:  Alert, Oriented x4, and Attentive      Response to Learning: Able to verbalize current knowledge/experience, Able to verbalize/acknowledge new learning, and Able to retain information      Endings: None Reported    Modes of Intervention: Support, Socialization, Exploration, Clarifying, and Problem-solving      Discipline Responsible: Psychoeducational Specialist      Signature:  Torres Reyes

## 2023-11-26 NOTE — GROUP NOTE
Group Therapy Note    Date: 11/26/2023    Group Start Time: 0800  Group End Time: 0830  Group Topic: Community Meeting    [facility] ADULT Elbert Huang, RN    Group Therapy Note                                                                    Group Note    Date: 11/26/23  Start Time: 8:00 AM   End Time:8:30 AM     Number of Participants: 9    Type of Group: Community/Goal     Patient's Goal: \"socializing with others\"     Notes:      Participation Level:  Active Listener    Participation Quality: Appropriate    Speech:  normal    Thought Process/Content: Logical    Mood:  calm and cooperative    Level of consciousness:  Alert    Response to Learning: Able to verbalize current knowledge/experience    Modes of Intervention: Education and Support    Discipline Responsible: Registered Nurse     Signature:  Kamilla Cesar RN   Electronically signed by Kamilla Cesar RN on 11/26/2023 at 9:19 AM

## 2023-11-27 VITALS
DIASTOLIC BLOOD PRESSURE: 71 MMHG | OXYGEN SATURATION: 97 % | HEART RATE: 85 BPM | SYSTOLIC BLOOD PRESSURE: 114 MMHG | TEMPERATURE: 98.2 F | RESPIRATION RATE: 16 BRPM

## 2023-11-27 PROCEDURE — 6370000000 HC RX 637 (ALT 250 FOR IP): Performed by: FAMILY MEDICINE

## 2023-11-27 PROCEDURE — 6370000000 HC RX 637 (ALT 250 FOR IP): Performed by: PSYCHIATRY & NEUROLOGY

## 2023-11-27 PROCEDURE — 5130000000 HC BRIDGE APPOINTMENT

## 2023-11-27 RX ORDER — ASENAPINE MALEATE 2.5 MG/1
TABLET SUBLINGUAL
Qty: 60 TABLET | Refills: 1 | Status: SHIPPED | OUTPATIENT
Start: 2023-11-27

## 2023-11-27 RX ORDER — IBUPROFEN 600 MG/1
600 TABLET ORAL EVERY 8 HOURS PRN
Qty: 90 TABLET | Refills: 0 | Status: SHIPPED | OUTPATIENT
Start: 2023-11-27

## 2023-11-27 RX ORDER — ERGOCALCIFEROL 1.25 MG/1
50000 CAPSULE ORAL WEEKLY
Qty: 11 CAPSULE | Refills: 0 | Status: SHIPPED | OUTPATIENT
Start: 2023-11-27

## 2023-11-27 RX ORDER — LIDOCAINE 4 G/G
2 PATCH TOPICAL DAILY
Status: DISCONTINUED | OUTPATIENT
Start: 2023-11-27 | End: 2023-11-27 | Stop reason: HOSPADM

## 2023-11-27 RX ORDER — LANOLIN ALCOHOL/MO/W.PET/CERES
3 CREAM (GRAM) TOPICAL DAILY
Qty: 30 TABLET | Refills: 1 | Status: SHIPPED | OUTPATIENT
Start: 2023-11-27

## 2023-11-27 RX ORDER — ATORVASTATIN CALCIUM 10 MG/1
10 TABLET, FILM COATED ORAL DAILY
Qty: 30 TABLET | Refills: 1 | Status: SHIPPED | OUTPATIENT
Start: 2023-11-27

## 2023-11-27 RX ORDER — QUETIAPINE FUMARATE 50 MG/1
50 TABLET, FILM COATED ORAL
Qty: 30 TABLET | Refills: 1 | Status: SHIPPED | OUTPATIENT
Start: 2023-11-27

## 2023-11-27 RX ORDER — TRAZODONE HYDROCHLORIDE 150 MG/1
150 TABLET ORAL NIGHTLY
Qty: 30 TABLET | Refills: 1 | Status: SHIPPED | OUTPATIENT
Start: 2023-11-27

## 2023-11-27 RX ORDER — LIDOCAINE 4 G/G
1 PATCH TOPICAL DAILY
Qty: 30 PATCH | Refills: 0 | Status: SHIPPED | OUTPATIENT
Start: 2023-11-27

## 2023-11-27 RX ADMIN — IBUPROFEN 600 MG: 600 TABLET ORAL at 13:05

## 2023-11-27 RX ADMIN — ACETAMINOPHEN 650 MG: 325 TABLET ORAL at 09:57

## 2023-11-27 RX ADMIN — ATORVASTATIN CALCIUM 10 MG: 10 TABLET, FILM COATED ORAL at 08:56

## 2023-11-27 RX ADMIN — IBUPROFEN 600 MG: 600 TABLET ORAL at 07:25

## 2023-11-27 ASSESSMENT — PAIN - FUNCTIONAL ASSESSMENT
PAIN_FUNCTIONAL_ASSESSMENT: ACTIVITIES ARE NOT PREVENTED

## 2023-11-27 ASSESSMENT — PAIN DESCRIPTION - LOCATION
LOCATION: BACK

## 2023-11-27 ASSESSMENT — PAIN SCALES - GENERAL
PAINLEVEL_OUTOF10: 5
PAINLEVEL_OUTOF10: 7

## 2023-11-27 ASSESSMENT — PAIN DESCRIPTION - DESCRIPTORS
DESCRIPTORS: ACHING

## 2023-11-27 NOTE — DISCHARGE INSTRUCTIONS
Medications:   Medication Summary Provided. I understand that I should take only the medications on my list.   --why and when I need to take each medication. --which side effects to watch for.   --that I should carry my medication information at all times in case of emergency situations. --I will take all medications until discontinued by physician. I will take all my medications to follow up appointments. --check with my physician or pharmacist before taking any new medication, over the counter product or drink alcohol.   --ask about food, drug and dietary supplement interactions. --discard old lists and update records with medication providers. Behavior Health Follow Up:  Original Referral Source: ED  Discharge Diagnosis: [unfilled]  Recommendations for Level of Care: Follow Up  Patient Status at Discharge: Stable  [facility]  was: Aftercare plan faxed:    Faxed by: Social Work staff Shakira   Date: 23   Time:  will fax   Prescriptions sent with pt.     General Information:   Questions regarding your bill: Call [phone]   Suicide Hotline (Rescue Crisis) 1-690.957.5335   To obtain results of pending studies call Medical Records at: [phone]   For emergencies call: 911 24 hour/7 days a week contact information: [phone]

## 2023-11-27 NOTE — PLAN OF CARE
Problem: Anxiety  Goal: Will report anxiety at manageable levels  Description: INTERVENTIONS:  1. Administer medication as ordered  2. Teach and rehearse alternative coping skills  3. Provide emotional support with 1:1 interaction with staff  11/24/2023 1109 [facility]  Outcome: Progressing     Group Therapy Note     Date: 11/24/2023  Start Time: 1000  End Time:  7954  Number of Participants: 4     Type of Group: Psychotherapy     Patient's Goal: Patient will process emotions and actions and how to relate to other or their with others. Patient discussed open communication and feelings and emotions. Notes:  Patient attended process group as scheduled, patient identified a issue to work on today regarding how they will interact and relate to others. Status After Intervention:  Improved     Participation Level:  Active Listener     Participation Quality: Appropriate, Attentive, and Sharing        Speech:  normal        Thought Process/Content: Logical        Affective Functioning: Congruent        Mood: euthymic        Level of consciousness:  Alert        Response to Learning: Able to verbalize current knowledge/experience        Endings: None Reported     Modes of Intervention: Education, Support, and Socialization        Discipline Responsible: /Counselor        Signature:  Eva Friend [facility]
Problem: Anxiety  Goal: Will report anxiety at manageable levels  Description: INTERVENTIONS:  1. Administer medication as ordered  2. Teach and rehearse alternative coping skills  3. Provide emotional support with 1:1 interaction with staff  11/27/2023 1624 by Shantell Verdugo RN  Outcome: Adequate for Discharge  11/27/2023 1137 by Carley Wahl  Outcome: Progressing  Flowsheets (Taken 11/27/2023 1020 by Shantell Verdugo RN)  Will report anxiety at manageable levels: Provide emotional support with 1:1 interaction with staff     Problem: Coping  Goal: Pt/Family able to verbalize concerns and demonstrate effective coping strategies  Description: INTERVENTIONS:  1. Assist patient/family to identify coping skills, available support systems and cultural and spiritual values  2. Provide emotional support, including active listening and acknowledgement of concerns of patient and caregivers  3. Reduce environmental stimuli, as able  4. Instruct patient/family in relaxation techniques, as appropriate  5. Assess for spiritual pain/suffering and initiate Spiritual Care, Psychosocial Clinical Specialist consults as needed  11/27/2023 1624 by Shantell Verdugo RN  Outcome: Adequate for Discharge  11/27/2023 1035 by Frederick Peter  Outcome: Progressing  Flowsheets (Taken 11/27/2023 1020 by Shantell Verdugo RN)  Patient/family able to verbalize anxieties, fears, and concerns, and demonstrate effective coping: Reduce environmental stimuli, as able  Note:                                                                     Group Therapy Note    Date: 11/27/2023  Start Time: 1000  End Time:  1030  Number of Participants: 9    Type of Group: Psychoeducation    Wellness Binder Information  Module Name:  Relapse Prevention  Session Number:  5    Group Goal for Pt:   To improve knowledge of relapse prevention strategies    Notes:  Pt demonstrated improved knowledge of relapse prevention strategies by actively participating in
Problem: Anxiety  Goal: Will report anxiety at manageable levels  Description: INTERVENTIONS:  1. Administer medication as ordered  2. Teach and rehearse alternative coping skills  3. Provide emotional support with 1:1 interaction with staff  Outcome: Progressing     Problem: Behavior  Goal: Pt/Family maintain appropriate behavior and adhere to behavioral management agreement, if implemented  Description: INTERVENTIONS:  1. Assess patient/family's coping skills and  non-compliant behavior (including use of illegal substances)  2. Notify security of behavior or suspected illegal substances which indicate the need for search of the family and/or belongings  3. Encourage verbalization of thoughts and concerns in a socially appropriate manner  4. Utilize positive, consistent limit setting strategies supporting safety of patient, staff and others  5. Encourage participation in the decision making process about the behavioral management agreement  6. If a visitor's behavior poses a threat to safety call refer to organization policy. 7. Initiate consult with , Psychosocial CNS, Spiritual Care as appropriate  Outcome: Progressing     Problem: Pain  Goal: Verbalizes/displays adequate comfort level or baseline comfort level  Outcome: Progressing     Problem: Neurosensory - Adult  Goal: Absence of seizures  Outcome: Progressing  Goal: Remains free of injury related to seizures activity  Outcome: Progressing     Problem: Safety - Adult  Goal: Free from fall injury  Outcome: Progressing     Problem: Self Harm/Suicidality  Goal: Will have no self-injury during hospital stay  Description: INTERVENTIONS:  1. Ensure constant observer at bedside with Q15M safety checks  2. Maintain a safe environment  3. Secure patient belongings  4. Ensure family/visitors adhere to safety recommendations  5. Ensure safety tray has been added to patient's diet order  6.   Every shift and PRN: Re-assess suicidal risk via Frequent
Problem: Anxiety  Goal: Will report anxiety at manageable levels  Outcome: Progressing       Group Therapy Note     Date: 11/27/2023  Start Time: 1100  End Time:  3397  Number of Participants: 8     Type of Group: Psychotherapy     Wellness Binder Information  Module Name:  emotional wellness  Session Number:  1     Patient's Goal:  validation of feelings     Notes:  pt acknowledged to have feelings validated it may be necessary to share feelings with others.      Status After Intervention:  Improved     Participation Level: Interactive     Participation Quality: Appropriate, Attentive, and Sharing        Speech:  normal        Thought Process/Content: Logical        Affective Functioning: Congruent        Mood: congruent        Level of consciousness:  Alert, Oriented x4, and Attentive        Response to Learning: Able to verbalize current knowledge/experience        Endings: None Reported     Modes of Intervention: Education        Discipline Responsible: Psychoeducational Specialist        Signature:  Giovanny Marquez
Problem: Coping  Goal: Pt/Family able to verbalize concerns and demonstrate effective coping strategies  Description: INTERVENTIONS:  1. Assist patient/family to identify coping skills, available support systems and cultural and spiritual values  2. Provide emotional support, including active listening and acknowledgement of concerns of patient and caregivers  3. Reduce environmental stimuli, as able  4. Instruct patient/family in relaxation techniques, as appropriate  5. Assess for spiritual pain/suffering and initiate Spiritual Care, Psychosocial Clinical Specialist consults as needed  Outcome: Progressing  Note:                                                                     Group Therapy Note    Date: 11/27/2023  Start Time: 1000  End Time:  1030  Number of Participants: 9    Type of Group: Psychoeducation    Wellness Binder Information  Module Name:  Relapse Prevention  Session Number:  5    Group Goal for Pt: To improve knowledge of relapse prevention strategies    Notes:  Pt demonstrated improved knowledge of relapse prevention strategies by actively participating in group discussion. Status After Intervention:  Unchanged    Participation Level:  Active Listener and Interactive    Participation Quality: Appropriate and Attentive      Speech:  normal      Thought Process/Content: Logical      Affective Functioning: Congruent      Mood: anxious and depressed      Level of consciousness:  Alert and Oriented x4      Response to Learning: Able to verbalize current knowledge/experience, Able to verbalize/acknowledge new learning, and Progressing to goal      Endings: None Reported    Modes of Intervention: Education      Discipline Responsible: Psychoeducational Specialist      Signature:  Emperatriz Galvez
normal sleeping pattern  Description: INTERVENTIONS:  1. Administer medication as ordered  2. Decrease environmental stimuli, including noise, as appropriate  3. Discourage social isolation and naps during the day  11/24/2023 1010 by Lakshmi Ling RN  Outcome: Progressing  11/24/2023 0328 by Ivan Lewis RN  Outcome: Progressing     Problem: Self Care Deficit  Goal: Return ADL status to a safe level of function  Description: INTERVENTIONS:  1. Administer medication as ordered  2. Assess ADL deficits and provide assistive devices as needed  3. Obtain PT/OT consults as needed  4. Assist and instruct patient to increase activity and self care as tolerated  11/24/2023 1010 by Lakshmi Ling RN  Outcome: Progressing  11/24/2023 0328 by Ivan Lewis RN  Outcome: Progressing     Problem: Spiritual Care  Goal: Pt/Family able to move forward in process of forgiving self, others, and/or higher power  Description: INTERVENTIONS:  1. Assist patient/family to overcome blocks to healing by use of spiritual practices (prayer, meditation, guided imagery, reiki, breath work, etc). 2. De-myth guilt and help patient/family identify possible irrational spiritual/cultural beliefs and values. 3. Explore possibilities of making amends & reconciliation with self, others, and/or a greater power. 4. Guide patient/family in identifying painful feelings of guilt. 5. Help patient/famiy explore and identify spiritual beliefs, cultural understandings or values that may help or hinder letting go of issue. 6. Help patient/family explore feelings of anger, bitterness, resentment. 7. Help patient/family identify and examine the situation in which these feelings are experienced. 8. Help patient/family identify destructive displacement of feelings onto other individuals. 9. Invite use of sacraments/rituals/ceremonies as appropriate (e.g. - confession, anointing, smudging).   10. Refer patient/family to formal counseling and/or to barb

## 2023-11-27 NOTE — DISCHARGE SUMMARY
attended and participated in groups. The patient did interact well with other patients and staff on the unit. Behaviorally she was not a problem. Patient was compliant with her medications. Patient was sleeping through the night. This patient is not suicidal, homicidal or psychotic at discharge. She does not present a danger to self or others. With the above mentioned medications changes as well as psychotherapeutic interventions, the patient reported considerable improvement in her condition. On 11/27/2023 it was therefore decided to discharge the patient, as it was felt that she received maximal benefit from her hospitalization.       Number of antipsychotic medication prescribed at discharge: One, Seroquel for sleep  IF MORE THAN ONE IS USED: NA    History of greater than 3 failed multiple monotherapy trials: NA  Monotherapy taper plan/ cross taper in progress: NA  Augmentation of Clozapine: NA    Referral to addiction treatment: NA    Prescription for Alcohol or Drug Disorder Medication: NA    Prescription for Tobacco Cessation medication: NA    If no prescriptions for Tobacco Cessation must document why: NA    Consults: Internal medicine    Significant Diagnostic Studies:   Lab Results   Component Value Date    WBC 5.7 11/23/2023    HGB 13.6 11/23/2023    HCT 40.5 11/23/2023    MCV 85.1 11/23/2023     11/23/2023     Lab Results   Component Value Date     11/23/2023    K 3.9 11/23/2023     11/23/2023    CO2 24 11/23/2023    BUN 10 11/23/2023    CREATININE 0.9 11/23/2023    GLUCOSE 162 (H) 11/23/2023    CALCIUM 9.3 11/23/2023    PROT 7.1 11/23/2023    LABALBU 4.4 11/23/2023    BILITOT <0.2 11/23/2023    ALKPHOS 87 11/23/2023    AST 20 11/23/2023    ALT 24 11/23/2023    LABGLOM >60 11/23/2023    GFRAA >59 10/05/2021       Lab Results   Component Value Date    TSHFT4 1.65 11/25/2023     Lab Results   Component Value Date    AFMQHZGF12 630 11/25/2023     Lab Results   Component Value Date

## 2023-11-29 ENCOUNTER — TELEPHONE (OUTPATIENT)
Dept: PSYCHIATRY | Age: 68
End: 2023-11-29

## 2023-11-29 NOTE — TELEPHONE ENCOUNTER
Called patient to remind them of their appointment   -Pt confirmed    Reminded patient to complete their visit pre-check/digital registration in [address] [place] [address].     Electronically signed by Jeniffer Murry MA on 11/29/2023 at 1:25 PM

## 2023-12-01 ENCOUNTER — OFFICE VISIT (OUTPATIENT)
Dept: PSYCHIATRY | Age: 68
End: 2023-12-01
Payer: MEDICARE

## 2023-12-01 VITALS
OXYGEN SATURATION: 90 % | HEIGHT: 65 IN | DIASTOLIC BLOOD PRESSURE: 78 MMHG | SYSTOLIC BLOOD PRESSURE: 121 MMHG | HEART RATE: 95 BPM | BODY MASS INDEX: 22.11 KG/M2 | TEMPERATURE: 97.3 F | WEIGHT: 132.7 LBS

## 2023-12-01 DIAGNOSIS — F33.1 MAJOR DEPRESSIVE DISORDER, RECURRENT, MODERATE (HCC): Primary | ICD-10-CM

## 2023-12-01 DIAGNOSIS — G47.00 INSOMNIA, UNSPECIFIED TYPE: ICD-10-CM

## 2023-12-01 DIAGNOSIS — F41.1 GENERALIZED ANXIETY DISORDER: ICD-10-CM

## 2023-12-01 PROCEDURE — 1124F ACP DISCUSS-NO DSCNMKR DOCD: CPT | Performed by: PSYCHIATRY & NEUROLOGY

## 2023-12-01 PROCEDURE — 99214 OFFICE O/P EST MOD 30 MIN: CPT | Performed by: PSYCHIATRY & NEUROLOGY

## 2023-12-01 ASSESSMENT — PATIENT HEALTH QUESTIONNAIRE - PHQ9
SUM OF ALL RESPONSES TO PHQ QUESTIONS 1-9: 13
4. FEELING TIRED OR HAVING LITTLE ENERGY: 2
SUM OF ALL RESPONSES TO PHQ QUESTIONS 1-9: 14
8. MOVING OR SPEAKING SO SLOWLY THAT OTHER PEOPLE COULD HAVE NOTICED. OR THE OPPOSITE, BEING SO FIGETY OR RESTLESS THAT YOU HAVE BEEN MOVING AROUND A LOT MORE THAN USUAL: 1
1. LITTLE INTEREST OR PLEASURE IN DOING THINGS: 2
3. TROUBLE FALLING OR STAYING ASLEEP: 2
2. FEELING DOWN, DEPRESSED OR HOPELESS: 1
SUM OF ALL RESPONSES TO PHQ QUESTIONS 1-9: 14
SUM OF ALL RESPONSES TO PHQ QUESTIONS 1-9: 14
9. THOUGHTS THAT YOU WOULD BE BETTER OFF DEAD, OR OF HURTING YOURSELF: 1
SUM OF ALL RESPONSES TO PHQ9 QUESTIONS 1 & 2: 3
5. POOR APPETITE OR OVEREATING: 1
6. FEELING BAD ABOUT YOURSELF - OR THAT YOU ARE A FAILURE OR HAVE LET YOURSELF OR YOUR FAMILY DOWN: 1
10. IF YOU CHECKED OFF ANY PROBLEMS, HOW DIFFICULT HAVE THESE PROBLEMS MADE IT FOR YOU TO DO YOUR WORK, TAKE CARE OF THINGS AT HOME, OR GET ALONG WITH OTHER PEOPLE: 1
7. TROUBLE CONCENTRATING ON THINGS, SUCH AS READING THE NEWSPAPER OR WATCHING TELEVISION: 3

## 2023-12-01 ASSESSMENT — COLUMBIA-SUICIDE SEVERITY RATING SCALE - C-SSRS
1. WITHIN THE PAST MONTH, HAVE YOU WISHED YOU WERE DEAD OR WISHED YOU COULD GO TO SLEEP AND NOT WAKE UP?: NO
BASED ON RESPONSES TO C-SSRS QS 1-6, WHAT IS THE PATIENT'S OVERALL RISK RATING FOR SUICIDE: NO RISK
6. HAVE YOU EVER DONE ANYTHING, STARTED TO DO ANYTHING, OR PREPARED TO DO ANYTHING TO END YOUR LIFE?: NO
3. HAVE YOU BEEN THINKING ABOUT HOW YOU MIGHT KILL YOURSELF?: NO
7. DID THIS OCCUR IN THE LAST THREE MONTHS: NO
4. HAVE YOU HAD THESE THOUGHTS AND HAD SOME INTENTION OF ACTING ON THEM?: NO
2. HAVE YOU ACTUALLY HAD ANY THOUGHTS OF KILLING YOURSELF?: NO
5. HAVE YOU STARTED TO WORK OUT OR WORKED OUT THE DETAILS OF HOW TO KILL YOURSELF? DO YOU INTEND TO CARRY OUT THIS PLAN?: NO

## 2023-12-07 ENCOUNTER — INFUSION (OUTPATIENT)
Dept: ONCOLOGY | Facility: HOSPITAL | Age: 68
End: 2023-12-07
Payer: MEDICARE

## 2023-12-07 ENCOUNTER — LAB (OUTPATIENT)
Dept: LAB | Facility: HOSPITAL | Age: 68
End: 2023-12-07
Payer: MEDICARE

## 2023-12-07 VITALS
SYSTOLIC BLOOD PRESSURE: 114 MMHG | HEIGHT: 65 IN | WEIGHT: 138 LBS | OXYGEN SATURATION: 92 % | BODY MASS INDEX: 22.99 KG/M2 | DIASTOLIC BLOOD PRESSURE: 69 MMHG | TEMPERATURE: 97.9 F | RESPIRATION RATE: 18 BRPM | HEART RATE: 95 BPM

## 2023-12-07 DIAGNOSIS — M81.0 SENILE OSTEOPOROSIS: ICD-10-CM

## 2023-12-07 DIAGNOSIS — M81.0 OSTEOPOROSIS, UNSPECIFIED OSTEOPOROSIS TYPE, UNSPECIFIED PATHOLOGICAL FRACTURE PRESENCE: Primary | ICD-10-CM

## 2023-12-07 LAB
CALCIUM SPEC-SCNC: 9.1 MG/DL (ref 8.6–10.5)
MAGNESIUM SERPL-MCNC: 2.1 MG/DL (ref 1.6–2.4)
PHOSPHATE SERPL-MCNC: 2.2 MG/DL (ref 2.5–4.5)

## 2023-12-07 PROCEDURE — 84100 ASSAY OF PHOSPHORUS: CPT

## 2023-12-07 PROCEDURE — 25010000002 DENOSUMAB 60 MG/ML SOLUTION PREFILLED SYRINGE: Performed by: INTERNAL MEDICINE

## 2023-12-07 PROCEDURE — 36415 COLL VENOUS BLD VENIPUNCTURE: CPT

## 2023-12-07 PROCEDURE — 82310 ASSAY OF CALCIUM: CPT

## 2023-12-07 PROCEDURE — 83735 ASSAY OF MAGNESIUM: CPT

## 2023-12-07 PROCEDURE — 96372 THER/PROPH/DIAG INJ SC/IM: CPT

## 2023-12-07 RX ORDER — THIAMINE HCL 100 MG
1 TABLET ORAL DAILY
COMMUNITY

## 2023-12-07 RX ORDER — CARIPRAZINE 1.5 MG/1
1.5 CAPSULE, GELATIN COATED ORAL DAILY
COMMUNITY
Start: 2023-12-06

## 2023-12-07 RX ORDER — ERGOCALCIFEROL 1.25 MG/1
1 CAPSULE ORAL WEEKLY
COMMUNITY
Start: 2023-11-27

## 2023-12-07 RX ADMIN — DENOSUMAB 60 MG: 60 INJECTION SUBCUTANEOUS at 14:37

## 2023-12-08 ENCOUNTER — TELEPHONE (OUTPATIENT)
Dept: PSYCHIATRY | Age: 68
End: 2023-12-08

## 2023-12-08 RX ORDER — QUETIAPINE FUMARATE 100 MG/1
100 TABLET, FILM COATED ORAL
Qty: 30 TABLET | Refills: 1 | Status: SHIPPED | OUTPATIENT
Start: 2023-12-08 | End: 2024-01-07

## 2023-12-08 NOTE — TELEPHONE ENCOUNTER
Pt called stating Dr Lili Moura told her she could add an extra Seroquel 25 mg at night to help with sleep. She stated that the addition is not helping, that she is getting up at 4 or 4:30 am and it is making for a long day. Pt stated that is she remains on the current increased dose of Seroquel that she will need a RX or needs something different that will help with her sleep. Pt informed that Dr Lili Moura will be given the above info and she will be called back.

## 2023-12-08 NOTE — TELEPHONE ENCOUNTER
Pt informed that Dr Maritza Easton has sent in RX to [address] for Seroquel 100 mg take one at bedtime to help with her sleep. Pt encourage, that after taking the med, to go from lying to standing slowly to prevent risk of falls. Pt verbalized understanding of the above info and plans to follow.

## 2024-01-02 RX ORDER — QUETIAPINE FUMARATE 100 MG/1
100 TABLET, FILM COATED ORAL
Qty: 30 TABLET | Refills: 1 | Status: SHIPPED | OUTPATIENT
Start: 2024-01-02 | End: 2024-01-03 | Stop reason: SDUPTHER

## 2024-01-02 NOTE — TELEPHONE ENCOUNTER
Called patient to remind them of their appointment   -Pt confirmed    Reminded patient to complete their visit pre-check/digital registration in [employer].    Electronically signed by Elsie Brady MA on 1/2/2024 at 12:08 PM     
Date     TRIG 167 (H) 10/07/2021            Lab Results   Component Value Date     HDL 53 (L) 10/07/2021            Lab Results   Component Value Date     LDLCALC 142 10/07/2021      No results found for: \"LABVLDL\", \"VLDL\"  No results found for: \"CHOLHDLRATIO\"        Lab Results   Component Value Date     LABA1C 5.7 11/25/2023      No results found for: \"EAG\"        Lab Results   Component Value Date     TSHFT4 1.65 11/25/2023            Lab Results   Component Value Date     VITD25 22.2 (L) 11/25/2023               Assessment:    1. Major depressive disorder, recurrent, moderate (HCC)    2. Generalized anxiety disorder    3. Insomnia, unspecified type       No evidence of acute suicidality, homicidality or psychosis observed.  Psychiatrically stable and doing well.     Plan:  1.  A trial of Vraylar for depression.   Increase Seroquel to 75 mg for sleep.  Discussed alternatives, benefits, & risks, including - but not limited to - black box warning regarding increased all-cause mortality in elderly with dementia (incl. cardiovascular & infectious risks), extra-pyramidal symptoms/tardive dyskinesia, metabolic side effects (incl. metabolic syndrome/hyperglycemia/dyslipidemia), cardiac side effects including sudden cardiac death, hypotension & increased fall risk.       Pt was instructed regarding the risks and benefits of antipsychotic therapy including but not limited to Neuroleptic malignant syndrome (tetrad of distinctive clinical features: fever, rigidity, mental status changes, and autonomic instability), EPS (Akathisia, Parkinsonism, Tardive dyskinesia [repetitive movements of mouth, tongue, face, trunk, or extremities], nausea, constipation, abdominal pain, galactorrhea, gynecomastia, Dizziness, Sedation, Anticholinergic effects, Hypotension, Weight gain, DM, DSL, hyperglycemia, QTc prolongation. Additionally, in regards to tardive dyskinesia pt was instructed about increase risk of permanency of these

## 2024-01-03 ENCOUNTER — TELEPHONE (OUTPATIENT)
Dept: PSYCHIATRY | Age: 69
End: 2024-01-03

## 2024-01-03 ENCOUNTER — OFFICE VISIT (OUTPATIENT)
Dept: PSYCHIATRY | Age: 69
End: 2024-01-03
Payer: MEDICARE

## 2024-01-03 VITALS
SYSTOLIC BLOOD PRESSURE: 98 MMHG | TEMPERATURE: 97.6 F | WEIGHT: 135 LBS | OXYGEN SATURATION: 95 % | BODY MASS INDEX: 22.49 KG/M2 | DIASTOLIC BLOOD PRESSURE: 66 MMHG | HEIGHT: 65 IN | HEART RATE: 96 BPM

## 2024-01-03 DIAGNOSIS — F33.1 MAJOR DEPRESSIVE DISORDER, RECURRENT, MODERATE (HCC): Primary | ICD-10-CM

## 2024-01-03 DIAGNOSIS — G47.00 INSOMNIA, UNSPECIFIED TYPE: ICD-10-CM

## 2024-01-03 DIAGNOSIS — F41.1 GENERALIZED ANXIETY DISORDER: ICD-10-CM

## 2024-01-03 PROCEDURE — 99214 OFFICE O/P EST MOD 30 MIN: CPT | Performed by: PSYCHIATRY & NEUROLOGY

## 2024-01-03 PROCEDURE — 1124F ACP DISCUSS-NO DSCNMKR DOCD: CPT | Performed by: PSYCHIATRY & NEUROLOGY

## 2024-01-03 RX ORDER — QUETIAPINE FUMARATE 50 MG/1
50 TABLET, FILM COATED ORAL 2 TIMES DAILY
Qty: 60 TABLET | Refills: 1 | Status: SHIPPED | OUTPATIENT
Start: 2024-01-03 | End: 2024-02-02

## 2024-01-03 ASSESSMENT — PATIENT HEALTH QUESTIONNAIRE - PHQ9
7. TROUBLE CONCENTRATING ON THINGS, SUCH AS READING THE NEWSPAPER OR WATCHING TELEVISION: 1
3. TROUBLE FALLING OR STAYING ASLEEP: 2
SUM OF ALL RESPONSES TO PHQ QUESTIONS 1-9: 7
5. POOR APPETITE OR OVEREATING: 0
8. MOVING OR SPEAKING SO SLOWLY THAT OTHER PEOPLE COULD HAVE NOTICED. OR THE OPPOSITE, BEING SO FIGETY OR RESTLESS THAT YOU HAVE BEEN MOVING AROUND A LOT MORE THAN USUAL: 0
10. IF YOU CHECKED OFF ANY PROBLEMS, HOW DIFFICULT HAVE THESE PROBLEMS MADE IT FOR YOU TO DO YOUR WORK, TAKE CARE OF THINGS AT HOME, OR GET ALONG WITH OTHER PEOPLE: 1
4. FEELING TIRED OR HAVING LITTLE ENERGY: 2
SUM OF ALL RESPONSES TO PHQ QUESTIONS 1-9: 7
2. FEELING DOWN, DEPRESSED OR HOPELESS: 1
SUM OF ALL RESPONSES TO PHQ QUESTIONS 1-9: 7
SUM OF ALL RESPONSES TO PHQ QUESTIONS 1-9: 7
SUM OF ALL RESPONSES TO PHQ9 QUESTIONS 1 & 2: 2
6. FEELING BAD ABOUT YOURSELF - OR THAT YOU ARE A FAILURE OR HAVE LET YOURSELF OR YOUR FAMILY DOWN: 0
9. THOUGHTS THAT YOU WOULD BE BETTER OFF DEAD, OR OF HURTING YOURSELF: 0
1. LITTLE INTEREST OR PLEASURE IN DOING THINGS: 1

## 2024-01-03 NOTE — TELEPHONE ENCOUNTER
Per  pt was giving # 28 of Vraylar 1.5 MG with verbal and written instructions    Electronically signed by Timothy Thomas on 1/3/2024 at 4:51 PM

## 2024-01-03 NOTE — PROGRESS NOTES
3. Insomnia, unspecified type        No evidence of acute suicidality, homicidality or psychosis observed.  Psychiatrically stable and doing well.    Plan:  1.  Continue current regimen. The risks, benefits, side effects, indications, contraindications, and adverse effects of the medications have been discussed. Yes.  2. The pt has verbalized understanding and has capacity to give informed consent.  3. The Ronny report has been reviewed according to HB1 regulations.  4. Supportive therapy offered.    5. Follow up: Return in about 2 months (around 3/3/2024).      6. The patient has been advised to call with any problems.  7. Controlled substance Treatment Plan: NA  8. The above listed medications have been continued, modifications in meds and other orders/labs as follows:      No orders of the defined types were placed in this encounter.       No orders of the defined types were placed in this encounter.        9. Additional comments:         10.Over 50% of the total visit time of 30  minutes was spent on counseling and/or coordination of care of:                        1. Major depressive disorder, recurrent, moderate (HCC)    2. Generalized anxiety disorder    3. Insomnia, unspecified type                    Nicolle Leonard MD medication change been

## 2024-01-16 ENCOUNTER — TELEPHONE (OUTPATIENT)
Dept: PSYCHIATRY | Age: 69
End: 2024-01-16

## 2024-01-16 NOTE — TELEPHONE ENCOUNTER
Called [facility] and spoke with Maria G to get determination for the pt assist requested for Vraylar and Saphris.  She stated that they had received all of the required info and she would send it over for processing-should get a determination in 7 to 10 days.  Pt given the above info.

## 2024-01-18 ENCOUNTER — TELEPHONE (OUTPATIENT)
Dept: PSYCHIATRY | Age: 69
End: 2024-01-18

## 2024-01-18 NOTE — TELEPHONE ENCOUNTER
Received fax from [employer] approving the pt assist for Vraylar through 12/31/24.    Pt given the above info and is aware that she should receive the med to her address.

## 2024-01-25 ENCOUNTER — TELEPHONE (OUTPATIENT)
Dept: PSYCHIATRY | Age: 69
End: 2024-01-25

## 2024-01-25 NOTE — TELEPHONE ENCOUNTER
Spoke with staff at [facility] to get the status on the request for Saphris 2.5 mg.  Staff stated that they do not have Saphris 2.5 mg with the pt assist program. They do have 5 mg and 10 mg.  Pt given the above info.  Pt informed that Dr Leonard is out of the office until 1/31/24 and writer will call her with an update on Dr Leonard's response on 2/1/24.  Pt is agreeable with this plan.  Pt stated that she has received the Vraylar per the pt assist program.

## 2024-02-05 ENCOUNTER — TELEPHONE (OUTPATIENT)
Dept: PSYCHIATRY | Age: 69
End: 2024-02-05

## 2024-02-05 NOTE — TELEPHONE ENCOUNTER
Dr Leonard made aware that pt assist only has Saphris 5 mg and 10 mg available.  Dr Leonard changed the RX to Saphris 5 mg take one tablet by mouth twice daily which was faxed to [facility] [employer] at [phone].  VM left for pt to call back so can discuss the above info.      Pt called back and was given the above info.  She was informed that if the Saphris 5 mg gets approved to be careful when she starts the increase dose to make sure it dose not make her too sleepy or dizzy-pt verbalized understanding.

## 2024-02-13 NOTE — TELEPHONE ENCOUNTER
Pharmacy sent a request to refill pt's medication       Last office visit : 1/3/2024 ELIZABETH Leonard MD  Next office visit : 3/4/2024 S Horacio ANDRADE    Requested Prescriptions     Pending Prescriptions Disp Refills    vitamin D (ERGOCALCIFEROL) 1.25 MG (54073 UT) CAPS capsule [Pharmacy Med Name: VITAMIN D2 1.25MG(50,000 UNIT)] 11 capsule 0     Sig: TAKE 1 CAPSULE BY MOUTH ONE TIME PER WEEK            Shameca Dukes           Expand All Collapse All  Florence CORNELIUS Case is a 68 y.o. female evaluated via telephone on 1/3/2024.             Florence CORNELIUS Case 1955                               Chief Complaint   Patient presents with    Medication Check               Subjective:       68-year-old white female with history of depression, PTSD, anxiety, CKD, presents for post-discharge follow up.  Patient is on Trazodone, Seroquel and PRN Saphris. Added Vraylar and increased Seroquel last visit.      Patient is calm and cooperative. Brighter. Mild depression. Medication effective.  No SI. Sleeping better. Smiling. She denies suicidal ideation. Spent holidays with family.         BP: BP 98/66   Pulse 96   Temp 97.6 °F (36.4 °C)   Ht 1.651 m (5' 5\")   Wt 61.2 kg (135 lb)   LMP  (LMP Unknown)   SpO2 95%   BMI 22.47 kg/m²         Review of Systems - 14 point review:  Negative except     Constitutional: (fevers, chills, night sweats, wt loss/gain, change in appetite, fatigue, somnolence)     HEENT: (ear pain or discharge, hearing loss, ear ringing, sinus pressure, nosebleed, nasal discharge, sore throat, oral sores, tooth pain, bleeding gums, hoarse voice, neck pain)      Cardiovascular: (HTN, chest pain, elevated cholesterol/lipids, palpitations, leg swelling, leg pain with walking)     Respiratory: (cough, wheezing, snoring, SOB with activity (dyspnea), SOB while lying flat (orthopnea), awakening with severe SOB (paroxysmal nocturnal dyspnea))     Gastrointestinal: (NVD, constipation, abdominal pain, bright red stools, black tarry

## 2024-02-14 RX ORDER — ERGOCALCIFEROL 1.25 MG/1
50000 CAPSULE ORAL WEEKLY
Qty: 11 CAPSULE | Refills: 0 | Status: SHIPPED | OUTPATIENT
Start: 2024-02-14

## 2024-02-16 ENCOUNTER — TELEPHONE (OUTPATIENT)
Dept: PSYCHIATRY | Age: 69
End: 2024-02-16

## 2024-02-16 NOTE — TELEPHONE ENCOUNTER
Called pt and she reported that she got a text from pt assist on the Saphris 4 days ago stating she would hear more in 2 days but had not heard from them.  Called [employer] and was told that it has now been pushed through for process with this phone call and can call back on Wed to see if a determination has been made-pt made aware.

## 2024-02-19 RX ORDER — IBUPROFEN 600 MG/1
600 TABLET ORAL EVERY 8 HOURS PRN
Qty: 90 TABLET | Refills: 0 | OUTPATIENT
Start: 2024-02-19

## 2024-02-23 ENCOUNTER — TELEPHONE (OUTPATIENT)
Dept: PSYCHIATRY | Age: 69
End: 2024-02-23

## 2024-02-23 NOTE — TELEPHONE ENCOUNTER
Called [facility] [employer] assist at [phone] and was told that there had not been a determination on the pt assist for Saphris 5 mg and to check back next week-pt made aware.

## 2024-02-26 ENCOUNTER — TELEPHONE (OUTPATIENT)
Dept: PSYCHIATRY | Age: 69
End: 2024-02-26

## 2024-02-26 RX ORDER — QUETIAPINE FUMARATE 50 MG/1
50 TABLET, FILM COATED ORAL 2 TIMES DAILY
Qty: 60 TABLET | Refills: 1 | Status: SHIPPED | OUTPATIENT
Start: 2024-02-26

## 2024-02-26 NOTE — TELEPHONE ENCOUNTER
Pharmacy sent a request to refill pt's medication       Last office visit : 1/3/2024 ELIZABETH Leonard MD  Next office visit : 3/4/2024 S Horacio ANDRADE    Requested Prescriptions     Pending Prescriptions Disp Refills    QUEtiapine (SEROQUEL) 50 MG tablet 60 tablet 1     Sig: Take 1 tablet by mouth 2 times daily            Timothy CORNELIUS Case 1955                               Chief Complaint   Patient presents with    Medication Check               Subjective:       68-year-old white female with history of depression, PTSD, anxiety, CKD, presents for post-discharge follow up.  Patient is on Trazodone, Seroquel and PRN Saphris. Added Vraylar and increased Seroquel last visit.      Patient is calm and cooperative. Brighter. Mild depression. Medication effective.  No SI. Sleeping better. Smiling. She denies suicidal ideation. Spent holidays with family.         BP: BP 98/66   Pulse 96   Temp 97.6 °F (36.4 °C)   Ht 1.651 m (5' 5\")   Wt 61.2 kg (135 lb)   LMP  (LMP Unknown)   SpO2 95%   BMI 22.47 kg/m²         Review of Systems - 14 point review:  Negative except     Constitutional: (fevers, chills, night sweats, wt loss/gain, change in appetite, fatigue, somnolence)     HEENT: (ear pain or discharge, hearing loss, ear ringing, sinus pressure, nosebleed, nasal discharge, sore throat, oral sores, tooth pain, bleeding gums, hoarse voice, neck pain)      Cardiovascular: (HTN, chest pain, elevated cholesterol/lipids, palpitations, leg swelling, leg pain with walking)     Respiratory: (cough, wheezing, snoring, SOB with activity (dyspnea), SOB while lying flat (orthopnea), awakening with severe SOB (paroxysmal nocturnal dyspnea))     Gastrointestinal: (NVD, constipation, abdominal pain, bright red stools, black tarry stools, stool incontinence)     Genitourinary:  (pelvic pain, burning or frequency of urination, urinary urgency, blood in urine incomplete bladder emptying, urinary incontinence, STD; MEN:

## 2024-02-26 NOTE — TELEPHONE ENCOUNTER
Called and let pt know that her script for quetiapine was sent to her pharmacy     Electronically signed by Timothy Thomas on 2/26/2024 at 11:28 AM

## 2024-02-28 ENCOUNTER — TELEPHONE (OUTPATIENT)
Dept: PSYCHIATRY | Age: 69
End: 2024-02-28

## 2024-02-28 RX ORDER — QUETIAPINE FUMARATE 100 MG/1
100 TABLET, FILM COATED ORAL NIGHTLY
Qty: 30 TABLET | Refills: 1 | OUTPATIENT
Start: 2024-02-28

## 2024-02-28 NOTE — TELEPHONE ENCOUNTER
Called and let pt know that the Patient Assistance was approved for her Saphris     Electronically signed by Timothy Thomas on 2/28/2024 at 11:51 AM'

## 2024-03-01 ENCOUNTER — TELEPHONE (OUTPATIENT)
Dept: PSYCHIATRY | Age: 69
End: 2024-03-01

## 2024-03-01 NOTE — TELEPHONE ENCOUNTER
Called patient to remind them of their appointment   -Pt confirmed    Reminded patient to complete their visit pre-check/digital registration in [employer].    Electronically signed by Elsie Brady MA on 3/1/2024 at 1:27 PM

## 2024-03-21 ENCOUNTER — TELEPHONE (OUTPATIENT)
Dept: PSYCHIATRY | Age: 69
End: 2024-03-21

## 2024-03-21 DIAGNOSIS — E78.41 ELEVATED LIPOPROTEIN(A): ICD-10-CM

## 2024-03-21 RX ORDER — ATORVASTATIN CALCIUM 10 MG/1
10 TABLET, FILM COATED ORAL DAILY
Qty: 90 TABLET | Refills: 3 | OUTPATIENT
Start: 2024-03-21

## 2024-03-21 RX ORDER — TRAZODONE HYDROCHLORIDE 150 MG/1
150 TABLET ORAL NIGHTLY
Qty: 90 TABLET | Refills: 2 | Status: SHIPPED | OUTPATIENT
Start: 2024-03-21

## 2024-03-21 NOTE — TELEPHONE ENCOUNTER
Called and lvm asking pt to return phone call to schedule an appt     Electronically signed by Timothy Thomas on 3/21/2024 at 2:05 PM

## 2024-03-21 NOTE — TELEPHONE ENCOUNTER
Lab Results   Component Value Date     HDL 53 (L) 10/07/2021            Lab Results   Component Value Date     LDLCALC 142 10/07/2021      No results found for: \"LABVLDL\", \"VLDL\"  No results found for: \"CHOLHDLRATIO\"        Lab Results   Component Value Date     LABA1C 5.7 11/25/2023      No results found for: \"EAG\"        Lab Results   Component Value Date     TSHFT4 1.65 11/25/2023            Lab Results   Component Value Date     VITD25 22.2 (L) 11/25/2023               Assessment:    1. Major depressive disorder, recurrent, moderate (HCC)    2. Generalized anxiety disorder    3. Insomnia, unspecified type          No evidence of acute suicidality, homicidality or psychosis observed.  Psychiatrically stable and doing well.     Plan:  1.  Continue current regimen. The risks, benefits, side effects, indications, contraindications, and adverse effects of the medications have been discussed. Yes.  2. The pt has verbalized understanding and has capacity to give informed consent.  3. The Ronny report has been reviewed according to HB1 regulations.  4. Supportive therapy offered.    5. Follow up:    Return in about 2 months (around 3/3/2024).      6. The patient has been advised to call with any problems.  7. Controlled substance Treatment Plan: NA  8. The above listed medications have been continued, modifications in meds and other orders/labs as follows:                   Encounter Medications   No orders of the defined types were placed in this encounter.                     No orders of the defined types were placed in this encounter.           9. Additional comments:            10.Over 50% of the total visit time of 30  minutes was spent on counseling and/or coordination of care of:                         1. Major depressive disorder, recurrent, moderate (HCC)    2. Generalized anxiety disorder    3. Insomnia, unspecified type                            Nicolle Leonard MD medication change been

## 2024-04-05 ENCOUNTER — TELEPHONE (OUTPATIENT)
Dept: PSYCHIATRY | Age: 69
End: 2024-04-05

## 2024-04-05 NOTE — TELEPHONE ENCOUNTER
Called patient to remind them of their appointment     -Pt confirmed    Electronically signed by Cayla Hoffman MA on 4/5/2024 at 1:19 PM

## 2024-04-08 ENCOUNTER — SCHEDULED TELEPHONE ENCOUNTER (OUTPATIENT)
Dept: PSYCHIATRY | Age: 69
End: 2024-04-08
Payer: MEDICARE

## 2024-04-08 ENCOUNTER — TELEPHONE (OUTPATIENT)
Dept: PSYCHIATRY | Age: 69
End: 2024-04-08

## 2024-04-08 DIAGNOSIS — G47.00 INSOMNIA, UNSPECIFIED TYPE: ICD-10-CM

## 2024-04-08 DIAGNOSIS — F33.40 RECURRENT MAJOR DEPRESSIVE DISORDER, IN REMISSION (HCC): Primary | ICD-10-CM

## 2024-04-08 DIAGNOSIS — F41.1 GENERALIZED ANXIETY DISORDER: ICD-10-CM

## 2024-04-08 PROCEDURE — 99443 PR PHYS/QHP TELEPHONE EVALUATION 21-30 MIN: CPT | Performed by: PSYCHIATRY & NEUROLOGY

## 2024-04-08 ASSESSMENT — PATIENT HEALTH QUESTIONNAIRE - PHQ9
9. THOUGHTS THAT YOU WOULD BE BETTER OFF DEAD, OR OF HURTING YOURSELF: NOT AT ALL
SUM OF ALL RESPONSES TO PHQ9 QUESTIONS 1 & 2: 0
SUM OF ALL RESPONSES TO PHQ QUESTIONS 1-9: 2
5. POOR APPETITE OR OVEREATING: NOT AT ALL
2. FEELING DOWN, DEPRESSED OR HOPELESS: NOT AT ALL
6. FEELING BAD ABOUT YOURSELF - OR THAT YOU ARE A FAILURE OR HAVE LET YOURSELF OR YOUR FAMILY DOWN: NOT AT ALL
SUM OF ALL RESPONSES TO PHQ QUESTIONS 1-9: 2
SUM OF ALL RESPONSES TO PHQ QUESTIONS 1-9: 2
3. TROUBLE FALLING OR STAYING ASLEEP: SEVERAL DAYS
8. MOVING OR SPEAKING SO SLOWLY THAT OTHER PEOPLE COULD HAVE NOTICED. OR THE OPPOSITE, BEING SO FIGETY OR RESTLESS THAT YOU HAVE BEEN MOVING AROUND A LOT MORE THAN USUAL: NOT AT ALL
4. FEELING TIRED OR HAVING LITTLE ENERGY: SEVERAL DAYS
7. TROUBLE CONCENTRATING ON THINGS, SUCH AS READING THE NEWSPAPER OR WATCHING TELEVISION: NOT AT ALL
1. LITTLE INTEREST OR PLEASURE IN DOING THINGS: NOT AT ALL
SUM OF ALL RESPONSES TO PHQ QUESTIONS 1-9: 2
10. IF YOU CHECKED OFF ANY PROBLEMS, HOW DIFFICULT HAVE THESE PROBLEMS MADE IT FOR YOU TO DO YOUR WORK, TAKE CARE OF THINGS AT HOME, OR GET ALONG WITH OTHER PEOPLE: NOT DIFFICULT AT ALL

## 2024-04-08 NOTE — TELEPHONE ENCOUNTER
Pt called wanting to change her in office appt to a phone appt because her appt is at the time that it will be dark from the [place] and she just does not know about driving in the dark.    Changed appt to a phone visit.    Electronically signed by Cayla Hoffman MA on 4/8/2024 at 1:12 PM

## 2024-04-08 NOTE — PROGRESS NOTES
WEEK 2/14/24   Nicolle Leonard MD   atorvastatin (LIPITOR) 10 MG tablet Take 1 tablet by mouth daily 11/27/23   Aubrey Malik MD   asenapine maleate (SAPHRIS) 2.5 MG SUBL sublingual tablet PLACE 1 TABLET UNDER THE TONGUE 2 TIMES DAILY AS NEEDED (ANXIETY) 11/27/23   Aubrey Malik MD   melatonin 3 MG TABS tablet Take 1 tablet by mouth daily 11/27/23   Aubrey Malik MD   lidocaine 4 % external patch Place 1 patch onto the skin daily 11/27/23   Aubrey Malik MD   ibuprofen (ADVIL;MOTRIN) 600 MG tablet Take 1 tablet by mouth every 8 hours as needed for Pain 11/27/23   Aubrey Malik MD       MSE:  Appearance: Casually dressed. Stable gait. No abnormal movement or tremor.   Behavior: Calm, cooperative, smiling somewhat superficially  Speech: Normal in tone, volume, and quality. No slurring, dysarthria or pressured speech noted.   Mood: \"better\"   Affect: Congruent  Thought Process: Appears linear, logical and goal oriented. Causality appears intact.   Thought Content: Denies active suicidal and homicidal ideations. No overt delusions or paranoia appreciated.   Perceptions: Denies auditory or visual hallucinations at present time. Not responding to internal stimuli.   Concentration: Intact.   Orientation: to person, place, date, and situation.   Language: Intact.   Fund of information: Intact.   Memory: Recent and remote appear intact.   Impulsivity: Limited.   Neurovegitative: Fair appetite and improved sleep.   Insight: Fair.   Judgment: Fair.      Lab Results   Component Value Date     11/23/2023    K 3.9 11/23/2023     11/23/2023    CO2 24 11/23/2023    BUN 10 11/23/2023    CREATININE 0.9 11/23/2023    GLUCOSE 162 (H) 11/23/2023    CALCIUM 9.3 11/23/2023    PROT 7.1 11/23/2023    LABALBU 4.4 11/23/2023    BILITOT <0.2 11/23/2023    ALKPHOS 87 11/23/2023    AST 20 11/23/2023    ALT 24 11/23/2023    LABGLOM >60 11/23/2023    GFRAA >59 10/05/2021     Lab Results   Component Value Date/Time

## 2024-04-29 NOTE — TELEPHONE ENCOUNTER
Pharmacy sent a request to refill pt's medication       Last office visit : 4/8/2024 S Horacio ANDRADE  Next office visit : 7/8/2024 S Horacio ANDRADE    Requested Prescriptions     Pending Prescriptions Disp Refills    vitamin D (ERGOCALCIFEROL) 1.25 MG (94034 UT) CAPS capsule [Pharmacy Med Name: VITAMIN D2 1.25MG(50,000 UNIT)] 11 capsule 0     Sig: TAKE 1 CAPSULE BY MOUTH ONE TIME PER WEEK            Shameca William        4/8/2024  [facility]       Nicolle Leonard MD  Psychiatry Recurrent major depressive disorder, in remission (HCC) +2 more  Dx Medication Check  Reason for Visit     Progress Notes  Nicolle Leonard MD (Physician)  Psychiatry  The note has been blocked from the patient portal for the following reason: Likely risk of substantial harm  Expand All Collapse All  Florence Mills is a 68 y.o. female evaluated via telephone on 4/8/2024.             Florence Mills 1955                           No chief complaint on file.        Documentation:  I communicated with the patient and/or health care decision maker about - see below  Details of this discussion including any medical advice provided: see below      Total Time: minutes: 21-30 minutes     Florence Mills was evaluated through a synchronous (real-time) audio encounter. Patient identification was verified at the start of the visit. She (or guardian if applicable) is aware that this is a billable service, which includes applicable co-pays. This visit was conducted with the patient's (and/or legal guardian's) verbal consent. She has not had a related appointment within my department in the past 7 days or scheduled within the next 24 hours.   The patient was located at Home: [address] [place] [address].  The provider was located at Facility (Appt Dept): [address] [place] [address].  [address]  [place] [address].     Note: not billable if this call serves to triage the patient into an appointment for the relevant concern     Florence Mills is a

## 2024-04-30 RX ORDER — ERGOCALCIFEROL 1.25 MG/1
50000 CAPSULE ORAL WEEKLY
Qty: 11 CAPSULE | Refills: 0 | Status: SHIPPED | OUTPATIENT
Start: 2024-04-30

## 2024-05-08 DIAGNOSIS — E78.41 ELEVATED LIPOPROTEIN(A): ICD-10-CM

## 2024-05-09 RX ORDER — ATORVASTATIN CALCIUM 10 MG/1
10 TABLET, FILM COATED ORAL DAILY
Qty: 90 TABLET | Refills: 3 | Status: SHIPPED | OUTPATIENT
Start: 2024-05-09

## 2024-05-29 RX ORDER — QUETIAPINE FUMARATE 50 MG/1
50 TABLET, FILM COATED ORAL 2 TIMES DAILY
Qty: 60 TABLET | Refills: 3 | Status: SHIPPED | OUTPATIENT
Start: 2024-05-29

## 2024-05-29 NOTE — TELEPHONE ENCOUNTER
better.  She denies suicidal ideation. Spending  time with family. Daughters are more supportive. Not interested in therapy.         LMP  (LMP Unknown)         Review of Systems - 14 point review:  Negative except     Constitutional: (fevers, chills, night sweats, wt loss/gain, change in appetite, fatigue, somnolence)     HEENT: (ear pain or discharge, hearing loss, ear ringing, sinus pressure, nosebleed, nasal discharge, sore throat, oral sores, tooth pain, bleeding gums, hoarse voice, neck pain)      Cardiovascular: (HTN, chest pain, elevated cholesterol/lipids, palpitations, leg swelling, leg pain with walking)     Respiratory: (cough, wheezing, snoring, SOB with activity (dyspnea), SOB while lying flat (orthopnea), awakening with severe SOB (paroxysmal nocturnal dyspnea))     Gastrointestinal: (NVD, constipation, abdominal pain, bright red stools, black tarry stools, stool incontinence)     Genitourinary:  (pelvic pain, burning or frequency of urination, urinary urgency, blood in urine incomplete bladder emptying, urinary incontinence, STD; MEN: testicular pain or swelling, erectile dysfunction; WOMEN: LMP, heavy menstrual bleeding (menorrhagia), irregular periods, postmenopausal bleeding, menstrual pain (dymenorrhea, vaginal discharge)     Musculoskeletal: (bone pain/fracture, joint pain or swelling, musle pain)     Integumentary: (rashes, acne, non-healing sores, itching, breast lumps, breast pain, nipple discharge, hair loss)     Neurologic: (HA, muscle weakness, paresthesias (numbness, coldness, crawling or prickling), memory loss, seizure, dizziness)     Endocrine: (heat or cold intolerance, excessive thirst (polydipsia), excessive hunger (polyphagia))     Immune/Allergic: (hives, seasonal or environmental allergies, HIV exposure)     Hematologic/Lymphatic: (lymph node enlargement, easy bleeding or bruising)     History obtained via chart review and patient     PCP is No primary care provider on file.

## 2024-06-07 ENCOUNTER — LAB (OUTPATIENT)
Dept: LAB | Facility: HOSPITAL | Age: 69
End: 2024-06-07
Payer: MEDICARE

## 2024-06-07 ENCOUNTER — INFUSION (OUTPATIENT)
Dept: ONCOLOGY | Facility: HOSPITAL | Age: 69
End: 2024-06-07
Payer: MEDICARE

## 2024-06-07 VITALS
TEMPERATURE: 97.9 F | OXYGEN SATURATION: 97 % | RESPIRATION RATE: 18 BRPM | BODY MASS INDEX: 22.26 KG/M2 | HEART RATE: 82 BPM | SYSTOLIC BLOOD PRESSURE: 125 MMHG | DIASTOLIC BLOOD PRESSURE: 68 MMHG | HEIGHT: 65 IN | WEIGHT: 133.6 LBS

## 2024-06-07 DIAGNOSIS — M81.0 SENILE OSTEOPOROSIS: ICD-10-CM

## 2024-06-07 DIAGNOSIS — M81.0 OSTEOPOROSIS, UNSPECIFIED OSTEOPOROSIS TYPE, UNSPECIFIED PATHOLOGICAL FRACTURE PRESENCE: Primary | ICD-10-CM

## 2024-06-07 LAB
CALCIUM SPEC-SCNC: 9.8 MG/DL (ref 8.6–10.5)
MAGNESIUM SERPL-MCNC: 2.1 MG/DL (ref 1.6–2.4)
PHOSPHATE SERPL-MCNC: 3.8 MG/DL (ref 2.5–4.5)

## 2024-06-07 PROCEDURE — 84100 ASSAY OF PHOSPHORUS: CPT

## 2024-06-07 PROCEDURE — 25010000002 DENOSUMAB 60 MG/ML SOLUTION PREFILLED SYRINGE: Performed by: INTERNAL MEDICINE

## 2024-06-07 PROCEDURE — 83735 ASSAY OF MAGNESIUM: CPT

## 2024-06-07 PROCEDURE — 36415 COLL VENOUS BLD VENIPUNCTURE: CPT

## 2024-06-07 PROCEDURE — 82310 ASSAY OF CALCIUM: CPT

## 2024-06-07 PROCEDURE — 96372 THER/PROPH/DIAG INJ SC/IM: CPT

## 2024-06-07 RX ADMIN — DENOSUMAB 60 MG: 60 INJECTION SUBCUTANEOUS at 13:08

## 2024-07-05 ENCOUNTER — TELEPHONE (OUTPATIENT)
Dept: PSYCHIATRY | Age: 69
End: 2024-07-05

## 2024-07-05 NOTE — TELEPHONE ENCOUNTER
Called patient to remind them of their appointment     -Pt asked to reschedule and was rescheduled 08/09/24 @ 11:30.  Pt is babysitting her grandchildren this summer and won't be able to come until August.    Electronically signed by Cayla Hoffman MA on 7/5/2024 at 11:33 AM

## 2024-08-02 ENCOUNTER — TELEPHONE (OUTPATIENT)
Dept: PSYCHIATRY | Age: 69
End: 2024-08-02

## 2024-08-02 RX ORDER — QUETIAPINE FUMARATE 50 MG/1
50 TABLET, FILM COATED ORAL
Qty: 90 TABLET | Refills: 3 | OUTPATIENT
Start: 2024-08-02

## 2024-08-02 NOTE — TELEPHONE ENCOUNTER
Pt called stating that she needed refill RX for Seroquel.  Contacted [facility] and was told that she has 3 refills left and staff will get it ready for her-pt made aware.

## 2024-08-09 ENCOUNTER — TELEPHONE (OUTPATIENT)
Dept: PSYCHIATRY | Age: 69
End: 2024-08-09

## 2024-08-09 ENCOUNTER — OFFICE VISIT (OUTPATIENT)
Dept: PSYCHIATRY | Age: 69
End: 2024-08-09
Payer: MEDICARE

## 2024-08-09 VITALS
SYSTOLIC BLOOD PRESSURE: 105 MMHG | WEIGHT: 133.2 LBS | BODY MASS INDEX: 22.19 KG/M2 | OXYGEN SATURATION: 96 % | TEMPERATURE: 98.5 F | HEIGHT: 65 IN | RESPIRATION RATE: 18 BRPM | HEART RATE: 75 BPM | DIASTOLIC BLOOD PRESSURE: 72 MMHG

## 2024-08-09 DIAGNOSIS — F33.40 RECURRENT MAJOR DEPRESSIVE DISORDER, IN REMISSION (HCC): Primary | ICD-10-CM

## 2024-08-09 DIAGNOSIS — F41.1 GENERALIZED ANXIETY DISORDER: ICD-10-CM

## 2024-08-09 DIAGNOSIS — G47.00 INSOMNIA, UNSPECIFIED TYPE: ICD-10-CM

## 2024-08-09 DIAGNOSIS — G47.10 HYPERSOMNIA: ICD-10-CM

## 2024-08-09 PROCEDURE — 99215 OFFICE O/P EST HI 40 MIN: CPT | Performed by: PSYCHIATRY & NEUROLOGY

## 2024-08-09 PROCEDURE — 1124F ACP DISCUSS-NO DSCNMKR DOCD: CPT | Performed by: PSYCHIATRY & NEUROLOGY

## 2024-08-09 ASSESSMENT — PATIENT HEALTH QUESTIONNAIRE - PHQ9
7. TROUBLE CONCENTRATING ON THINGS, SUCH AS READING THE NEWSPAPER OR WATCHING TELEVISION: SEVERAL DAYS
1. LITTLE INTEREST OR PLEASURE IN DOING THINGS: SEVERAL DAYS
SUM OF ALL RESPONSES TO PHQ QUESTIONS 1-9: 6
8. MOVING OR SPEAKING SO SLOWLY THAT OTHER PEOPLE COULD HAVE NOTICED. OR THE OPPOSITE, BEING SO FIGETY OR RESTLESS THAT YOU HAVE BEEN MOVING AROUND A LOT MORE THAN USUAL: NOT AT ALL
9. THOUGHTS THAT YOU WOULD BE BETTER OFF DEAD, OR OF HURTING YOURSELF: NOT AT ALL
10. IF YOU CHECKED OFF ANY PROBLEMS, HOW DIFFICULT HAVE THESE PROBLEMS MADE IT FOR YOU TO DO YOUR WORK, TAKE CARE OF THINGS AT HOME, OR GET ALONG WITH OTHER PEOPLE: NOT DIFFICULT AT ALL
SUM OF ALL RESPONSES TO PHQ QUESTIONS 1-9: 6
SUM OF ALL RESPONSES TO PHQ QUESTIONS 1-9: 6
3. TROUBLE FALLING OR STAYING ASLEEP: NEARLY EVERY DAY
SUM OF ALL RESPONSES TO PHQ QUESTIONS 1-9: 6
SUM OF ALL RESPONSES TO PHQ9 QUESTIONS 1 & 2: 1
5. POOR APPETITE OR OVEREATING: NOT AT ALL
4. FEELING TIRED OR HAVING LITTLE ENERGY: SEVERAL DAYS
2. FEELING DOWN, DEPRESSED OR HOPELESS: NOT AT ALL
6. FEELING BAD ABOUT YOURSELF - OR THAT YOU ARE A FAILURE OR HAVE LET YOURSELF OR YOUR FAMILY DOWN: NOT AT ALL

## 2024-08-09 NOTE — PROGRESS NOTES
Florence CORNELIUS Case is a 69 y.o. female evaluated via telephone on 8/9/2024.          Florence CORNELIUS Case 1955      Chief Complaint   Patient presents with    Medication Check            Subjective:    69-year-old white female with history of depression, PTSD, anxiety, HLD, CKD, osteoporosis, presents for post-discharge follow up.  Patient is on Vraylar 1.5 mg, Trazodone 150 mg, Seroquel 100 mg and PRN Saphris.     Patient is calm and cooperative. Mood is stable. Medication effective.  No SI. Sleeping poorly still.  Hypersomnia. Will do a sleep study. Spending  time with family. Daughters are supportive. Youngest son in [place]. Not interested in therapy. She has 2 dogs and 4 cats.       /72 (Site: Right Upper Arm, Position: Sitting)   Pulse 75   Temp 98.5 °F (36.9 °C) (Temporal)   Resp 18   Ht 1.651 m (5' 5\")   Wt 60.4 kg (133 lb 3.2 oz)   LMP  (LMP Unknown)   SpO2 96%   BMI 22.17 kg/m²       Review of Systems - 14 point review:  Negative except    Constitutional: (fevers, chills, night sweats, wt loss/gain, change in appetite, fatigue, somnolence)    HEENT: (ear pain or discharge, hearing loss, ear ringing, sinus pressure, nosebleed, nasal discharge, sore throat, oral sores, tooth pain, bleeding gums, hoarse voice, neck pain)      Cardiovascular: (HTN, chest pain, elevated cholesterol/lipids, palpitations, leg swelling, leg pain with walking)    Respiratory: (cough, wheezing, snoring, SOB with activity (dyspnea), SOB while lying flat (orthopnea), awakening with severe SOB (paroxysmal nocturnal dyspnea))    Gastrointestinal: (NVD, constipation, abdominal pain, bright red stools, black tarry stools, stool incontinence)     Genitourinary:  (pelvic pain, burning or frequency of urination, urinary urgency, blood in urine incomplete bladder emptying, urinary incontinence, STD; MEN: testicular pain or swelling, erectile dysfunction; WOMEN: LMP, heavy menstrual bleeding (menorrhagia), irregular periods, postmenopausal

## 2024-08-09 NOTE — TELEPHONE ENCOUNTER
Called pt on 08/08/24 to remind them of their appt for 08/09/24    -Pt confirmed    Electronically signed by Cayla Hoffman MA on 8/9/2024 at 11:30 AM

## 2024-08-15 ENCOUNTER — OFFICE VISIT (OUTPATIENT)
Dept: INTERNAL MEDICINE | Facility: CLINIC | Age: 69
End: 2024-08-15
Payer: MEDICARE

## 2024-08-15 VITALS
WEIGHT: 130.6 LBS | HEART RATE: 83 BPM | TEMPERATURE: 97.5 F | DIASTOLIC BLOOD PRESSURE: 68 MMHG | OXYGEN SATURATION: 95 % | SYSTOLIC BLOOD PRESSURE: 110 MMHG | HEIGHT: 65 IN | BODY MASS INDEX: 21.76 KG/M2

## 2024-08-15 DIAGNOSIS — R10.31 ABDOMINAL PAIN, RLQ: ICD-10-CM

## 2024-08-15 DIAGNOSIS — Z12.31 ENCOUNTER FOR SCREENING MAMMOGRAM FOR MALIGNANT NEOPLASM OF BREAST: ICD-10-CM

## 2024-08-15 DIAGNOSIS — F41.8 DEPRESSION WITH ANXIETY: ICD-10-CM

## 2024-08-15 DIAGNOSIS — N94.89 ADNEXAL MASS: ICD-10-CM

## 2024-08-15 DIAGNOSIS — Z00.00 ENCOUNTER FOR SUBSEQUENT ANNUAL WELLNESS VISIT (AWV) IN MEDICARE PATIENT: Primary | ICD-10-CM

## 2024-08-15 DIAGNOSIS — N18.31 STAGE 3A CHRONIC KIDNEY DISEASE: ICD-10-CM

## 2024-08-15 DIAGNOSIS — E78.00 HYPERCHOLESTEREMIA: ICD-10-CM

## 2024-08-15 DIAGNOSIS — E55.9 VITAMIN D INSUFFICIENCY: ICD-10-CM

## 2024-08-15 DIAGNOSIS — Z12.11 COLON CANCER SCREENING: ICD-10-CM

## 2024-08-15 DIAGNOSIS — M81.0 AGE-RELATED OSTEOPOROSIS WITHOUT CURRENT PATHOLOGICAL FRACTURE: ICD-10-CM

## 2024-08-15 DIAGNOSIS — R53.83 OTHER FATIGUE: ICD-10-CM

## 2024-08-15 DIAGNOSIS — M79.652 LEFT THIGH PAIN: ICD-10-CM

## 2024-08-15 RX ORDER — ERGOCALCIFEROL 1.25 MG/1
50000 CAPSULE ORAL WEEKLY
Qty: 12 CAPSULE | Refills: 3 | Status: SHIPPED | OUTPATIENT
Start: 2024-08-15

## 2024-08-15 NOTE — ASSESSMENT & PLAN NOTE
Renal condition is stable.  Continue current treatment regimen.  Weight loss.  Regular aerobic exercise.  Sodium restriction  Renal condition will be reassessed in 6 months.

## 2024-08-15 NOTE — ASSESSMENT & PLAN NOTE
Continue with strict fall precautions, Prolia, discussed importance of calcium and vitamin D supplementation, weightbearing exercises.

## 2024-08-15 NOTE — ASSESSMENT & PLAN NOTE
Patient's depression is a recurrent episode that is moderate without psychosis. Depression is in partial remission and stable.    Plan:   Continue current medication therapy   Continue to follow with psychiatry    Followup in 6 months.

## 2024-08-15 NOTE — PROGRESS NOTES
Subjective   The ABCs of the Annual Wellness Visit  Medicare Wellness Visit      Gloria Glasgow is a 69 y.o. patient who presents for a Medicare Wellness Visit.    The following portions of the patient's history were reviewed and   updated as appropriate: allergies, current medications, past family history, past medical history, past social history, past surgical history, and problem list.    Compared to one year ago, the patient's physical   health is the same.  Compared to one year ago, the patient's mental   health is better.    Recent Hospitalizations:  She was admitted within the past 365 days at [facility].     Current Medical Providers:  Patient Care Team:  Mallory Kilpatrick APRN as PCP - General (Internal Medicine)  Rick Wade MD as Consulting Physician (Pulmonary Disease)    Outpatient Medications Prior to Visit   Medication Sig Dispense Refill    Asenapine Maleate 2.5 MG sublingual tablet Take 2 tablets by mouth 2 (Two) Times a Day.      atorvastatin (LIPITOR) 10 MG tablet TAKE 1 TABLET BY MOUTH EVERY DAY 90 tablet 3    SEROquel 50 MG tablet Every Night.      traZODone (DESYREL) 150 MG tablet Take 1 tablet by mouth Every Night.      Vraylar 1.5 MG capsule capsule Take 1 capsule by mouth Daily.      Calcium Citrate-Vitamin D3 (CITRACAL) 315-6.25 MG-MCG tablet tablet Take 1 tablet by mouth Daily. (Patient not taking: Reported on 8/15/2024)      Fish Oil-Cholecalciferol (Omega-3 Fish Oil/Vitamin D3) 9530-5156 MG-UNIT capsule Take 1 capsule by mouth Daily. (Patient not taking: Reported on 8/15/2024) 90 capsule 3    vitamin D (ERGOCALCIFEROL) 1.25 MG (04383 UT) capsule capsule Take 1 capsule by mouth 1 (One) Time Per Week. (Patient not taking: Reported on 8/15/2024)       No facility-administered medications prior to visit.     No opioid medication identified on active medication list. I have reviewed chart for other potential  high risk medication/s and harmful drug interactions in the  "elderly.      Aspirin is not on active medication list.  Aspirin use is not indicated based on review of current medical condition/s. Risk of harm outweighs potential benefits.  .    Patient Active Problem List   Diagnosis    Intentional drug overdose    Acute respiratory failure with hypoxia    Depression with anxiety    Hypotension    Stage 3a chronic kidney disease    Hypercholesteremia    Osteoporosis     Advance Care Planning Advance Directive is not on file.  ACP discussion was held with the patient during this visit. Patient does not have an advance directive, information provided.            Objective   Vitals:    08/15/24 1536   BP: 110/68   BP Location: Left arm   Patient Position: Sitting   Cuff Size: Adult   Pulse: 83   Temp: 97.5 °F (36.4 °C)   TempSrc: Infrared   SpO2: 95%   Weight: 59.2 kg (130 lb 9.6 oz)   Height: 165.1 cm (65\")   PainSc: 0-No pain       Estimated body mass index is 21.73 kg/m² as calculated from the following:    Height as of this encounter: 165.1 cm (65\").    Weight as of this encounter: 59.2 kg (130 lb 9.6 oz).    BMI is within normal parameters. No other follow-up for BMI required.       Does the patient have evidence of cognitive impairment? No                                                                                                Health  Risk Assessment    Smoking Status:  Social History     Tobacco Use   Smoking Status Former    Current packs/day: 0.00    Average packs/day: 1 pack/day for 10.0 years (10.0 ttl pk-yrs)    Types: Cigarettes    Start date: 6/30/2011    Quit date: 6/30/2021    Years since quitting: 3.1   Smokeless Tobacco Never   Tobacco Comments    Started smoking during a bad divorce     Alcohol Consumption:  Social History     Substance and Sexual Activity   Alcohol Use Not Currently    Comment: Have a drink maybe 6 times a yr       Fall Risk Screen  STEADI Fall Risk Assessment was completed, and patient is at LOW risk for falls.Assessment completed " on:8/15/2024    Depression Screenin/15/2024     3:39 PM   PHQ-2/PHQ-9 Depression Screening   Little Interest or Pleasure in Doing Things 0-->not at all   Feeling Down, Depressed or Hopeless 0-->not at all   PHQ-9: Brief Depression Severity Measure Score 0     Health Habits and Functional and Cognitive Screenin/15/2024     3:40 PM   Functional & Cognitive Status   Do you have difficulty preparing food and eating? No   Do you have difficulty bathing yourself, getting dressed or grooming yourself? No   Do you have difficulty using the toilet? No   Do you have difficulty moving around from place to place? No   Do you have trouble with steps or getting out of a bed or a chair? Yes   Current Diet Unhealthy Diet   Dental Exam Up to date   Eye Exam Up to date   Exercise (times per week) 3 times per week   Current Exercises Include Walking   Do you need help using the phone?  No   Are you deaf or do you have serious difficulty hearing?  No   Do you need help to go to places out of walking distance? No   Do you need help shopping? No   Do you need help preparing meals?  No   Do you need help with housework?  No   Do you need help with laundry? No   Do you need help taking your medications? No   Do you need help managing money? No   Do you ever drive or ride in a car without wearing a seat belt? No   Have you felt unusual stress, anger or loneliness in the last month? No   Who do you live with? Alone   If you need help, do you have trouble finding someone available to you? No   Have you been bothered in the last four weeks by sexual problems? No   Do you have difficulty concentrating, remembering or making decisions? Yes           Age-appropriate Screening Schedule:  Refer to the list below for future screening recommendations based on patient's age, sex and/or medical conditions. Orders for these recommended tests are listed in the plan section. The patient has been provided with a written plan.    Health  Maintenance List  Health Maintenance   Topic Date Due    COLORECTAL CANCER SCREENING  Never done    TDAP/TD VACCINES (1 - Tdap) Never done    ZOSTER VACCINE (1 of 2) Never done    HEPATITIS C SCREENING  Never done    COVID-19 Vaccine (3 - 2023-24 season) 09/01/2023    LIPID PANEL  08/01/2024    INFLUENZA VACCINE  08/01/2024    ANNUAL WELLNESS VISIT  08/15/2025    MAMMOGRAM  08/15/2025    DXA SCAN  08/15/2025    Pneumococcal Vaccine 65+  Completed                                                                                                                                                CMS Preventative Services Quick Reference  Risk Factors Identified During Encounter  Chronic Pain: Natural history and expected course discussed. Questions answered.  Depression/Dysphoria:  Currently stable, continuing with current medication regimen and following with psychiatry  Fall Risk-High or Moderate: Discussed Fall Prevention in the home  Immunizations Discussed/Encouraged: Tdap, Influenza, Shingrix, and COVID19  Inadequate Social Support, Isolation, Loneliness, Lack of Transportation, Financial Difficulties, or Caregiver Stress: She did express issues regarding finances, at this time she is able to get all of her medications at no cost, her family has been assistive with some financial expenses, at this time she is not going without essentials, she is aware to please call/let me know if she does run into more severe financial issues as I would get case management on board to see if there is any other assistance available for her, she expresses understanding.  Inactivity/Sedentary: Patient was advised to exercise at least 150 minutes a week per CDC recommendations.  Dental Screening Recommended  Vision Screening Recommended    The above risks/problems have been discussed with the patient.  Pertinent information has been shared with the patient in the After Visit Summary.  An After Visit Summary and PPPS were made available to  the patient.    Follow Up:   Next Medicare Wellness visit to be scheduled in 1 year.         Additional E&M Note during same encounter follows:  Patient has additional, significant, and separately identifiable condition(s)/problem(s) that require work above and beyond the Medicare Wellness Visit     Chief Complaint  Abdominal Pain (Patient complains of RLQ abd pain x months. /States the pain is intermittent. ), Medicare Wellness-subsequent (Needs labs - pt is not fasting. ), and Leg Pain (Patient complains of L thigh pain x 2 months. /States the pain will worsen with sitting. /Taken Ibuprofen and this gave mild relief. )    Chris Castro is also being seen today for additional medical problem/s.  She reports concern and that the chronic intermittent discomfort she has been experiencing in her right lower quadrant for numerous years seems to be increasing in intensity and frequency, she has a known right sided adnexal cystic mass that was last evaluated in June 2023, at that time it had appeared stable with no changes.  She reports she has not noted any GI changes, she continues to be constipated having a bowel movement on average every 2 to 3 days.  She does drink roughly 33 ounces of water daily per report.  She states she has given up diet Coke and now cannot stand the taste of it when she does try it.  She reports due to financial difficulties she has been unable to purchase fresh fruits and vegetables, her meals primarily consist of frozen TV dinners although she does try to choose the healthier options.  In the year since I have seen her she reports she has had some ups and downs, she states that at [facility] she admitted herself at [facility] with suicidal ideations and plan, she states she stayed there for about 4 to 5 days after which she received a message from one of her daughters that she had been estranged from stating I love you, she states since then they (both daughters ) have been back in  "her life, she has been going over to one of the other's house multiple times a week, she was even able to babysit her grandson's and get to know them this past summer and she states this has been absolutely wonderful for her.  She reports her mental health is currently stable without any HI/SI.  She is continuing to follow closely with her psychiatrist Dr. Fonseca.    She also reports having issues of intermittent left lateral thigh pain for the last couple months, she describes the pain as a burning sensation and sometimes a pins and needle sensation, she reports it is not occurring today.  When the pain does occur she has been taking ibuprofen which sometimes helps, she has tried topical lidocaine which also helps a little bit but nothing seems to help a lot.  She has not had any recurrent episodes of acute on chronic low back pain as she has been careful to modify her activities to deter this.    She reports she does not exercise very much and as such her legs are weak and she does have some unsteadiness at times but does not mention any recent falls.      Review of Systems   Gastrointestinal:  Positive for abdominal pain.              Objective   Vital Signs:  /68 (BP Location: Left arm, Patient Position: Sitting, Cuff Size: Adult)   Pulse 83   Temp 97.5 °F (36.4 °C) (Infrared)   Ht 165.1 cm (65\")   Wt 59.2 kg (130 lb 9.6 oz)   SpO2 95%   BMI 21.73 kg/m²   Physical Exam  Vitals and nursing note reviewed.   Constitutional:       General: She is not in acute distress.     Appearance: Normal appearance. She is normal weight. She is not ill-appearing, toxic-appearing or diaphoretic.   HENT:      Head: Normocephalic and atraumatic.      Right Ear: Tympanic membrane, ear canal and external ear normal. There is no impacted cerumen.      Left Ear: Tympanic membrane, ear canal and external ear normal. There is no impacted cerumen.      Nose: Nose normal.      Mouth/Throat:      Mouth: Mucous membranes are " moist.      Pharynx: Oropharynx is clear. No oropharyngeal exudate or posterior oropharyngeal erythema.   Eyes:      Extraocular Movements: Extraocular movements intact.      Conjunctiva/sclera: Conjunctivae normal.      Pupils: Pupils are equal, round, and reactive to light.   Neck:      Thyroid: No thyroid mass, thyromegaly or thyroid tenderness.      Vascular: No carotid bruit.   Cardiovascular:      Rate and Rhythm: Normal rate and regular rhythm.      Pulses: Normal pulses.      Heart sounds: Normal heart sounds.   Pulmonary:      Effort: Pulmonary effort is normal.      Breath sounds: Normal breath sounds.   Abdominal:      General: Bowel sounds are normal. There is no distension.      Palpations: Abdomen is soft.      Tenderness: There is abdominal tenderness (RLQ). There is no guarding.   Musculoskeletal:         General: No tenderness. Normal range of motion.      Cervical back: Normal range of motion and neck supple.      Left upper leg: No swelling, edema, deformity, lacerations, tenderness or bony tenderness.      Right lower leg: No edema.      Left lower leg: No edema.      Comments: No tenderness today on examination of the left lateral thigh region, no physical abnormality noted on examination   Skin:     General: Skin is warm and dry.      Capillary Refill: Capillary refill takes less than 2 seconds.   Neurological:      General: No focal deficit present.      Mental Status: She is alert and oriented to person, place, and time. Mental status is at baseline.      Gait: Gait normal.   Psychiatric:         Mood and Affect: Mood normal.         Behavior: Behavior normal.         Thought Content: Thought content normal.         Judgment: Judgment normal.         The following data was reviewed by: NAHUM Ybarra on 08/15/2024:        Assessment and Plan Additional age appropriate preventative wellness advice topics were discussed during today's preventative wellness exam(some topics already  addressed during AWV portion of the note above):    Physical Activity: Advised cardiovascular activity 150 minutes per week as tolerated. (example brisk walk for 30 minutes, 5 days a week).     Nutrition: Discussed nutrition plan with patient. Information shared in after visit summary. Goal is for a well balanced diet to enhance overall health.     Healthy Weight: Discussed current and goal BMI with patient. Steps to attain this goal discussed. Information shared in after visit summary.     Motor Vehicle Safety Discussion:  Wearing Seatbelt While in Motor Vehicle recommendation. Adhering to posted speed limit recommendation.     Injury Prevention Discussion:  Information shared in after visit summary.                    Encounter for subsequent annual wellness visit (AWV) in Medicare patient  She is aware we will communicate lab results once available.  We discussed the importance of avoiding oral NSAIDs in the setting of CKD, discussed the importance of increasing water intake, ultimate goal is to increase water intake to 64 ounces daily.  We discussed the importance of implementing strengthening exercises to help decrease risk of falls, she is aware of high fracture risk given osteoporosis.  She specifically would like to work on core strengthening so we went over several exercises she can implement to help with this.  Stage 3a chronic kidney disease  Renal condition is stable.  Continue current treatment regimen.  Weight loss.  Regular aerobic exercise.  Sodium restriction  Renal condition will be reassessed in 6 months.  Depression with anxiety  Patient's depression is a recurrent episode that is moderate without psychosis. Depression is in partial remission and stable.    Plan:   Continue current medication therapy   Continue to follow with psychiatry    Followup in 6 months.   Hypercholesteremia   Lipid abnormalities are stable    Plan:  Continue same medication/s without change.      Discussed medication  dosage, use, side effects, and goals of treatment in detail.    Counseled patient on lifestyle modifications to help control hyperlipidemia.     Patient Treatment Goals:   LDL goal is under 100    Followup in 6 months.  Age-related osteoporosis without current pathological fracture  Continue with strict fall precautions, Prolia, discussed importance of calcium and vitamin D supplementation, weightbearing exercises.  Vitamin D insufficiency  Refill sent, per reports she never obtained the previous prescription, discussed importance of letting us know if she is having issues with obtaining prescribe medications  Other fatigue    Left thigh pain  As she is not having worsening low back pain, significant immobility, advised to proceed with more conservative measures, will try Voltaren gel, she is aware she needs to let me know how this works for her.  Encounter for screening mammogram for malignant neoplasm of breast  Recommended monthly self breast exams and reporting abnormalities in the interim  Colon cancer screening  We discussed options today, she is very hesitant about doing a colonoscopy, discussed option of doing Cologuard which she was accepting of, did discuss this test is 99.9% effective at detecting colon cancer but does have a higher false positive rate and in the setting of a positive Cologuard test a colonoscopy is recommended.  She reports understanding.  She has not noted any changes in gastrointestinal function other than continued chronic constipation which I recommended increasing water intake for.  Adnexal mass  This was last evaluated in June 2023 when we obtain a CT of abdomen pelvis related to issues at that time, at that time the adnexal cystic mass remained unchanged from previous evaluation, today she reports worsening right lower quadrant pain, recommended proceeding with reevaluation via transvaginal ultrasound which she has been accepting of.  There is no mention of any vaginal bleeding,  abnormal discharge.  She reports no urinary issues/complaints.  Abdominal pain, RLQ      Orders Placed This Encounter   Procedures    Mammo Screening Digital Tomosynthesis Bilateral With CAD     Standing Status:   Future     Standing Expiration Date:   8/15/2025     Order Specific Question:   Reason for Exam:     Answer:   breast cancer screening     Order Specific Question:   Release to patient     Answer:   Routine Release [[id number]]    US Non-ob Transvaginal     Standing Status:   Future     Standing Expiration Date:   8/15/2025     Order Specific Question:   Reason for Exam:     Answer:   Follow-up on right sided adnexal cystic mass - is having more signficant pain in region, last assessed on CT in 6/2023     Order Specific Question:   Release to patient     Answer:   Routine Release [[id number]]    TSH Rfx On Abnormal To Free T4     Order Specific Question:   Release to patient     Answer:   Routine Release [[id number]]    Vitamin D,25-Hydroxy     Order Specific Question:   Release to patient     Answer:   Routine Release [[id number]]    Lipid Panel     Order Specific Question:   Release to patient     Answer:   Routine Release [[id number]]    Comprehensive Metabolic Panel     Order Specific Question:   Release to patient     Answer:   Routine Release [[id number]]    Cologuard - Stool, Per Rectum     Standing Status:   Future     Standing Expiration Date:   8/15/2025     Order Specific Question:   Release to patient     Answer:   Routine Release [[id number]]    Urinalysis With Microscopic - Urine, Clean Catch     Order Specific Question:   Release to patient     Answer:   Routine Release [[id number]]    CBC & Differential     Order Specific Question:   Manual Differential     Answer:   No     Order Specific Question:   Release to patient     Answer:   Routine Release [[id number]]     New Medications Ordered This Visit   Medications    Diclofenac Sodium (VOLTAREN) 1 % gel gel     Sig: Apply 4 g topically to  the appropriate area as directed 4 (Four) Times a Day As Needed (pain).     Dispense:  100 g     Refill:  3    vitamin D (ERGOCALCIFEROL) 1.25 MG (95905 UT) capsule capsule     Sig: Take 1 capsule by mouth 1 (One) Time Per Week.     Dispense:  12 capsule     Refill:  3      I urged her to return to the office as needed for acute issues/concerns, we discussed methods at which she can request an appointment either via MyChart or calling the office.  Recommend routine dental and eye exams  Remember to utilize sunscreen when exposed to prolonged sunlight  Always use your seatbelt when in motorized vehicle  We discussed getting Shingrix vaccine at her pharmacy in order for her insurance to cover cost  Recommended getting the influenza vaccine and COVID-19 vaccine either late September or early October.      Follow Up   Return in about 6 months (around 2/15/2025) for Recheck, 30 minute appt needed.  Patient was given instructions and counseling regarding her condition or for health maintenance advice. Please see specific information pulled into the AVS if appropriate.

## 2024-08-16 LAB
25(OH)D3+25(OH)D2 SERPL-MCNC: 51.2 NG/ML (ref 30–100)
ALBUMIN SERPL-MCNC: 4.7 G/DL (ref 3.5–5.2)
ALBUMIN/GLOB SERPL: 2.2 G/DL
ALP SERPL-CCNC: 54 U/L (ref 39–117)
ALT SERPL-CCNC: 23 U/L (ref 1–33)
APPEARANCE UR: CLEAR
AST SERPL-CCNC: 23 U/L (ref 1–32)
BACTERIA #/AREA URNS HPF: NORMAL /HPF
BASOPHILS # BLD AUTO: 0.03 10*3/MM3 (ref 0–0.2)
BASOPHILS NFR BLD AUTO: 0.4 % (ref 0–1.5)
BILIRUB SERPL-MCNC: <0.2 MG/DL (ref 0–1.2)
BILIRUB UR QL STRIP: NEGATIVE
BUN SERPL-MCNC: 16 MG/DL (ref 8–23)
BUN/CREAT SERPL: 13.4 (ref 7–25)
CALCIUM SERPL-MCNC: 10.2 MG/DL (ref 8.6–10.5)
CHLORIDE SERPL-SCNC: 100 MMOL/L (ref 98–107)
CHOLEST SERPL-MCNC: 205 MG/DL (ref 0–200)
CO2 SERPL-SCNC: 26.6 MMOL/L (ref 22–29)
COLOR UR: YELLOW
CREAT SERPL-MCNC: 1.19 MG/DL (ref 0.57–1)
EGFRCR SERPLBLD CKD-EPI 2021: 49.9 ML/MIN/1.73
EOSINOPHIL # BLD AUTO: 0.06 10*3/MM3 (ref 0–0.4)
EOSINOPHIL NFR BLD AUTO: 0.9 % (ref 0.3–6.2)
EPI CELLS #/AREA URNS HPF: NORMAL /HPF
ERYTHROCYTE [DISTWIDTH] IN BLOOD BY AUTOMATED COUNT: 12.4 % (ref 12.3–15.4)
GLOBULIN SER CALC-MCNC: 2.1 GM/DL
GLUCOSE SERPL-MCNC: 78 MG/DL (ref 65–99)
GLUCOSE UR QL STRIP: NEGATIVE
HCT VFR BLD AUTO: 43.1 % (ref 34–46.6)
HDLC SERPL-MCNC: 69 MG/DL (ref 40–60)
HGB BLD-MCNC: 14.4 G/DL (ref 12–15.9)
HGB UR QL STRIP: NEGATIVE
IMM GRANULOCYTES # BLD AUTO: 0.02 10*3/MM3 (ref 0–0.05)
IMM GRANULOCYTES NFR BLD AUTO: 0.3 % (ref 0–0.5)
KETONES UR QL STRIP: ABNORMAL
LDLC SERPL CALC-MCNC: 118 MG/DL (ref 0–100)
LEUKOCYTE ESTERASE UR QL STRIP: NEGATIVE
LYMPHOCYTES # BLD AUTO: 2.24 10*3/MM3 (ref 0.7–3.1)
LYMPHOCYTES NFR BLD AUTO: 32.2 % (ref 19.6–45.3)
MCH RBC QN AUTO: 29.6 PG (ref 26.6–33)
MCHC RBC AUTO-ENTMCNC: 33.4 G/DL (ref 31.5–35.7)
MCV RBC AUTO: 88.7 FL (ref 79–97)
MONOCYTES # BLD AUTO: 0.44 10*3/MM3 (ref 0.1–0.9)
MONOCYTES NFR BLD AUTO: 6.3 % (ref 5–12)
MUCOUS THREADS URNS QL MICRO: NORMAL /HPF
NEUTROPHILS # BLD AUTO: 4.17 10*3/MM3 (ref 1.7–7)
NEUTROPHILS NFR BLD AUTO: 59.9 % (ref 42.7–76)
NITRITE UR QL STRIP: NEGATIVE
NRBC BLD AUTO-RTO: 0 /100 WBC (ref 0–0.2)
PH UR STRIP: 5.5 [PH] (ref 5–8)
PLATELET # BLD AUTO: 328 10*3/MM3 (ref 140–450)
POTASSIUM SERPL-SCNC: 4.3 MMOL/L (ref 3.5–5.2)
PROT SERPL-MCNC: 6.8 G/DL (ref 6–8.5)
PROT UR QL STRIP: ABNORMAL
RBC # BLD AUTO: 4.86 10*6/MM3 (ref 3.77–5.28)
RBC #/AREA URNS HPF: NORMAL /HPF
SODIUM SERPL-SCNC: 140 MMOL/L (ref 136–145)
SP GR UR STRIP: >1.03 (ref 1–1.03)
TRIGL SERPL-MCNC: 102 MG/DL (ref 0–150)
TSH SERPL DL<=0.005 MIU/L-ACNC: 1.28 UIU/ML (ref 0.27–4.2)
UROBILINOGEN UR STRIP-MCNC: ABNORMAL MG/DL
VLDLC SERPL CALC-MCNC: 18 MG/DL (ref 5–40)
WBC # BLD AUTO: 6.96 10*3/MM3 (ref 3.4–10.8)
WBC #/AREA URNS HPF: NORMAL /HPF

## 2024-08-16 NOTE — PROGRESS NOTES
Urinalysis does have a trace amount of protein in it, her renal function has declined from previous evaluation, continue to try to incorporate at least 64 ounces of water daily, avoid NSAIDs such as ibuprofen, Advil, Aleve as these can be harmful to the kidneys -I would like to consider initiating a medication called Farxiga or Jardiance for management of her chronic kidney disease if she would be willing to try it, she would need to return to the office 4 weeks after starting the medication to recheck her kidney function.  This medication can help decrease cardiovascular event risk and can help promote kidney health/help deter further decline.  Cost may be an issue however I cannot know this for sure and less I prescribed a medication, please advise I do not expect her to initiate a medication she cannot afford. -If she is willing to try this medication please let me know and I will place order.  Her blood sugar is in normal range, electrolytes were in normal ranges as well and liver enzymes are normal.  Thyroid function appears appropriate  Great improvement in vitamin D, continue with current supplementation  Great improvement in cholesterol, keep up the great work.  CBC is completely normal

## 2024-08-19 DIAGNOSIS — N18.31 STAGE 3A CHRONIC KIDNEY DISEASE: Primary | ICD-10-CM

## 2024-08-19 DIAGNOSIS — R80.1 PERSISTENT PROTEINURIA: ICD-10-CM

## 2024-08-19 RX ORDER — DAPAGLIFLOZIN 5 MG/1
5 TABLET, FILM COATED ORAL DAILY
Qty: 30 TABLET | Refills: 0 | Status: SHIPPED | OUTPATIENT
Start: 2024-08-19

## 2024-08-19 NOTE — PROGRESS NOTES
Called the patient to notify that Farxiga has been sent to the pharmacy and the patient states she picked up the medication and only costs $4.  Patient was placed on the lab schedule in 4 weeks.    .

## 2024-08-21 ENCOUNTER — HOSPITAL ENCOUNTER (OUTPATIENT)
Dept: MAMMOGRAPHY | Facility: HOSPITAL | Age: 69
Discharge: HOME | End: 2024-08-21
Payer: MEDICARE

## 2024-08-21 DIAGNOSIS — Z12.31 ENCOUNTER FOR SCREENING MAMMOGRAM FOR MALIGNANT NEOPLASM OF BREAST: ICD-10-CM

## 2024-08-21 PROCEDURE — 77063 BREAST TOMOSYNTHESIS BI: CPT

## 2024-08-21 PROCEDURE — 77067 SCR MAMMO BI INCL CAD: CPT

## 2024-08-22 NOTE — PROGRESS NOTES
Please inform her that mammogram was negative for signs of malignancy, recommendations are to continue with annual mammograms, report abnormalities noted on monthly self breast exams in the meantime.

## 2024-08-26 ENCOUNTER — HOSPITAL ENCOUNTER (OUTPATIENT)
Dept: ULTRASOUND IMAGING | Facility: HOSPITAL | Age: 69
Discharge: HOME | End: 2024-08-26
Payer: MEDICARE

## 2024-08-26 DIAGNOSIS — R10.31 ABDOMINAL PAIN, RLQ: ICD-10-CM

## 2024-08-26 DIAGNOSIS — N94.89 ADNEXAL MASS: ICD-10-CM

## 2024-08-26 PROCEDURE — 76830 TRANSVAGINAL US NON-OB: CPT

## 2024-08-26 NOTE — TELEPHONE ENCOUNTER
Pharmacy sent request for 90 day RX.    (Last RX sent 5/29/24 #60 with 3 refills)    Last office visit : 8/9/2024 with Dr Leonard  Next office visit : 12/9/2024 with Dr Leonard    Requested Prescriptions     Pending Prescriptions Disp Refills    QUEtiapine (SEROQUEL) 50 MG tablet [Pharmacy Med Name: QUETIAPINE FUMARATE 50 MG TAB] 180 tablet 2     Sig: TAKE 1 TABLET BY MOUTH TWICE A DAY            Dara Dubose RN     Florence ASHLI Case is a 69 y.o. female evaluated via telephone on 8/9/2024.             Florence CORNELIUS Case 1955                               Chief Complaint   Patient presents with    Medication Check               Subjective:    69-year-old white female with history of depression, PTSD, anxiety, HLD, CKD, osteoporosis, presents for post-discharge follow up.  Patient is on Vraylar 1.5 mg, Trazodone 150 mg, Seroquel 100 mg and PRN Saphris.      Patient is calm and cooperative. Mood is stable. Medication effective.  No SI. Sleeping poorly still.  Hypersomnia. Will do a sleep study. Spending  time with family. Daughters are supportive. Youngest son in half-way. Not interested in therapy. She has 2 dogs and 4 cats.         /72 (Site: Right Upper Arm, Position: Sitting)   Pulse 75   Temp 98.5 °F (36.9 °C) (Temporal)   Resp 18   Ht 1.651 m (5' 5\")   Wt 60.4 kg (133 lb 3.2 oz)   LMP  (LMP Unknown)   SpO2 96%   BMI 22.17 kg/m²         Review of Systems - 14 point review:  Negative except     Constitutional: (fevers, chills, night sweats, wt loss/gain, change in appetite, fatigue, somnolence)     HEENT: (ear pain or discharge, hearing loss, ear ringing, sinus pressure, nosebleed, nasal discharge, sore throat, oral sores, tooth pain, bleeding gums, hoarse voice, neck pain)      Cardiovascular: (HTN, chest pain, elevated cholesterol/lipids, palpitations, leg swelling, leg pain with walking)     Respiratory: (cough, wheezing, snoring, SOB with activity (dyspnea), SOB while lying flat (orthopnea), awakening

## 2024-08-27 ENCOUNTER — TELEPHONE (OUTPATIENT)
Dept: PSYCHIATRY | Age: 69
End: 2024-08-27

## 2024-08-27 DIAGNOSIS — N94.9 ADNEXAL CYST: Primary | ICD-10-CM

## 2024-08-27 DIAGNOSIS — R10.31 RLQ ABDOMINAL PAIN: ICD-10-CM

## 2024-08-27 DIAGNOSIS — N94.89 ADNEXAL MASS: Primary | ICD-10-CM

## 2024-08-27 DIAGNOSIS — D39.11 NEOPLASM OF UNCERTAIN BEHAVIOR OF RIGHT OVARY: ICD-10-CM

## 2024-08-27 RX ORDER — QUETIAPINE FUMARATE 50 MG/1
50 TABLET, FILM COATED ORAL 2 TIMES DAILY
Qty: 60 TABLET | Refills: 3 | Status: SHIPPED | OUTPATIENT
Start: 2024-08-27 | End: 2024-09-26

## 2024-08-27 NOTE — PROGRESS NOTES
Please inform her that ultrasound reveals that the cyst is of similar size when compared to imaging from last year, however given continued continued/worsening pain/discomfort in this area I have added additional labs for when she returns to the office on 9/19/2024 for lab recheck, I am also advising referral to gynecology for additional evaluation and management just in case.  If she is accepting of this please let me know and I will get the referral placed.

## 2024-09-04 ENCOUNTER — OFFICE VISIT (OUTPATIENT)
Age: 69
End: 2024-09-04
Payer: MEDICARE

## 2024-09-04 VITALS
DIASTOLIC BLOOD PRESSURE: 76 MMHG | HEIGHT: 65 IN | BODY MASS INDEX: 21.49 KG/M2 | WEIGHT: 129 LBS | SYSTOLIC BLOOD PRESSURE: 108 MMHG

## 2024-09-04 DIAGNOSIS — N83.201 CYST OF RIGHT OVARY: ICD-10-CM

## 2024-09-04 DIAGNOSIS — R10.2 PELVIC PAIN: Primary | ICD-10-CM

## 2024-09-04 PROCEDURE — 99203 OFFICE O/P NEW LOW 30 MIN: CPT | Performed by: OBSTETRICS & GYNECOLOGY

## 2024-09-04 PROCEDURE — 1159F MED LIST DOCD IN RCRD: CPT | Performed by: OBSTETRICS & GYNECOLOGY

## 2024-09-04 PROCEDURE — 1160F RVW MEDS BY RX/DR IN RCRD: CPT | Performed by: OBSTETRICS & GYNECOLOGY

## 2024-09-04 NOTE — PROGRESS NOTES
"Chief Complaint  Pelvic Pain (Pt here as new gyn referral from [facility] for RLQ pelvic pain that is intermittent and started several months ago, she laso had CT done 06/2023, had u/s done 08/26/2024 that showed 5.7 x 3.7 x 5.9 cm round fluid structure in the RIGHT pelvis, favor adnexal cyst, u/s not done in office today, s/p hysterectomy due to irregular and heavy bleeding)    Subjective        Patient presents today to discuss pelvic pain  Ultrasound images are reviewed  A simple, nearly 6 cm cyst is associated with her right ovary  Pain comes and goes  Seems to have been worse in the past 3 months, however in the past 3 weeks it has not been bothersome at all  She manages it with heat and Tylenol  We discussed surgical removal versus continued conservative management  She wishes for continued conservative management    Gloria Case presents to [facility]  History of Present Illness    Objective   Vital Signs:  /76 (BP Location: Right arm, Patient Position: Sitting, Cuff Size: Adult)   Ht 165.1 cm (65\")   Wt 58.5 kg (129 lb)   BMI 21.47 kg/m²   Estimated body mass index is 21.47 kg/m² as calculated from the following:    Height as of this encounter: 165.1 cm (65\").    Weight as of this encounter: 58.5 kg (129 lb).    BMI is within normal parameters. No other follow-up for BMI required.      Physical Exam  Constitutional:       General: She is not in acute distress.     Appearance: Normal appearance. She is not toxic-appearing.   HENT:      Head: Normocephalic and atraumatic.      Nose: Nose normal.   Abdominal:      General: Abdomen is flat. Bowel sounds are normal.      Palpations: Abdomen is soft.   Neurological:      General: No focal deficit present.      Mental Status: She is alert.   Psychiatric:         Mood and Affect: Mood normal.         Behavior: Behavior normal.         Thought Content: Thought content normal.         Judgment: Judgment normal.        Result Review " :                Assessment and Plan   Diagnoses and all orders for this visit:    1. Pelvic pain (Primary)    2. Cyst of right ovary             Follow Up   Return in about 6 months (around 3/4/2025) for With Gyn U/S, with me.  Patient was given instructions and counseling regarding her condition or for health maintenance advice. Please see specific information pulled into the AVS if appropriate.     Continue conservative management  Warnings given  Serial ultrasound observation    Emmett Abad MD

## 2024-09-13 DIAGNOSIS — R80.1 PERSISTENT PROTEINURIA: ICD-10-CM

## 2024-09-13 DIAGNOSIS — N18.31 STAGE 3A CHRONIC KIDNEY DISEASE: ICD-10-CM

## 2024-09-13 RX ORDER — DAPAGLIFLOZIN 5 MG/1
5 TABLET, FILM COATED ORAL DAILY
Qty: 30 TABLET | Refills: 0 | Status: SHIPPED | OUTPATIENT
Start: 2024-09-13

## 2024-09-13 NOTE — TELEPHONE ENCOUNTER
I have sent the prescription in for an additional 3 days but please remind her that she needs to return to the office to have a BMP recheck to ensure appropriate renal function and electrolyte levels.

## 2024-09-16 ENCOUNTER — HOSPITAL ENCOUNTER (OUTPATIENT)
Dept: SLEEP CENTER | Age: 69
Discharge: HOME | End: 2024-09-18
Payer: MEDICARE

## 2024-09-16 DIAGNOSIS — G47.10 HYPERSOMNIA: ICD-10-CM

## 2024-09-16 PROCEDURE — G0399 HOME SLEEP TEST/TYPE 3 PORTA: HCPCS

## 2024-09-17 PROCEDURE — G0399 HOME SLEEP TEST/TYPE 3 PORTA: HCPCS

## 2024-09-19 DIAGNOSIS — R80.1 PERSISTENT PROTEINURIA: ICD-10-CM

## 2024-09-19 DIAGNOSIS — N18.31 STAGE 3A CHRONIC KIDNEY DISEASE: ICD-10-CM

## 2024-09-19 RX ORDER — DAPAGLIFLOZIN 5 MG/1
5 TABLET, FILM COATED ORAL DAILY
Qty: 90 TABLET | Refills: 3 | Status: SHIPPED | OUTPATIENT
Start: 2024-09-19

## 2024-09-23 RX ORDER — QUETIAPINE FUMARATE 50 MG/1
50 TABLET, FILM COATED ORAL 2 TIMES DAILY
Qty: 180 TABLET | Refills: 2 | Status: SHIPPED | OUTPATIENT
Start: 2024-09-23

## 2024-09-24 ENCOUNTER — TELEPHONE (OUTPATIENT)
Dept: PSYCHIATRY | Age: 69
End: 2024-09-24

## 2024-09-24 ENCOUNTER — FOLLOWUP TELEPHONE ENCOUNTER (OUTPATIENT)
Dept: SLEEP CENTER | Age: 69
End: 2024-09-24

## 2024-12-03 DIAGNOSIS — M81.0 OSTEOPOROSIS, UNSPECIFIED OSTEOPOROSIS TYPE, UNSPECIFIED PATHOLOGICAL FRACTURE PRESENCE: Primary | ICD-10-CM

## 2024-12-05 ENCOUNTER — TRANSCRIBE ORDERS (OUTPATIENT)
Dept: ADMINISTRATIVE | Facility: HOSPITAL | Age: 69
End: 2024-12-05
Payer: MEDICARE

## 2024-12-05 DIAGNOSIS — M81.0 OSTEOPOROSIS, POST-MENOPAUSAL: Primary | ICD-10-CM

## 2024-12-09 RX ORDER — TRAZODONE HYDROCHLORIDE 150 MG/1
150 TABLET ORAL NIGHTLY
Qty: 90 TABLET | Refills: 3 | Status: SHIPPED | OUTPATIENT
Start: 2024-12-09

## 2024-12-09 NOTE — TELEPHONE ENCOUNTER
remission (HCC)    2. Generalized anxiety disorder    3. Insomnia, unspecified type    4. Hypersomnia                                  Nicolle Leonard MD

## 2024-12-13 ENCOUNTER — TELEPHONE (OUTPATIENT)
Dept: PSYCHIATRY | Age: 69
End: 2024-12-13

## 2024-12-13 NOTE — TELEPHONE ENCOUNTER
Called and confirmed appt with pt for 12/16 @ 10 am    Electronically signed by Timothy Thomas on 12/13/2024 at 9:45 AM

## 2024-12-16 ENCOUNTER — OFFICE VISIT (OUTPATIENT)
Dept: PSYCHIATRY | Age: 69
End: 2024-12-16
Payer: MEDICARE

## 2024-12-16 VITALS
DIASTOLIC BLOOD PRESSURE: 68 MMHG | BODY MASS INDEX: 21.16 KG/M2 | SYSTOLIC BLOOD PRESSURE: 87 MMHG | WEIGHT: 127 LBS | HEART RATE: 81 BPM | TEMPERATURE: 97.5 F | OXYGEN SATURATION: 93 % | HEIGHT: 65 IN

## 2024-12-16 DIAGNOSIS — G47.00 INSOMNIA, UNSPECIFIED TYPE: ICD-10-CM

## 2024-12-16 DIAGNOSIS — F33.0 MAJOR DEPRESSIVE DISORDER, RECURRENT, MILD (HCC): Primary | ICD-10-CM

## 2024-12-16 DIAGNOSIS — F41.1 GENERALIZED ANXIETY DISORDER: ICD-10-CM

## 2024-12-16 PROCEDURE — 99215 OFFICE O/P EST HI 40 MIN: CPT | Performed by: PSYCHIATRY & NEUROLOGY

## 2024-12-16 PROCEDURE — 1124F ACP DISCUSS-NO DSCNMKR DOCD: CPT | Performed by: PSYCHIATRY & NEUROLOGY

## 2024-12-16 RX ORDER — TRAZODONE HYDROCHLORIDE 100 MG/1
200 TABLET ORAL NIGHTLY
Qty: 180 TABLET | Refills: 3 | Status: SHIPPED | OUTPATIENT
Start: 2024-12-16 | End: 2025-03-16

## 2024-12-16 ASSESSMENT — PATIENT HEALTH QUESTIONNAIRE - PHQ9
2. FEELING DOWN, DEPRESSED OR HOPELESS: SEVERAL DAYS
SUM OF ALL RESPONSES TO PHQ QUESTIONS 1-9: 8
6. FEELING BAD ABOUT YOURSELF - OR THAT YOU ARE A FAILURE OR HAVE LET YOURSELF OR YOUR FAMILY DOWN: NOT AT ALL
4. FEELING TIRED OR HAVING LITTLE ENERGY: SEVERAL DAYS
8. MOVING OR SPEAKING SO SLOWLY THAT OTHER PEOPLE COULD HAVE NOTICED. OR THE OPPOSITE, BEING SO FIGETY OR RESTLESS THAT YOU HAVE BEEN MOVING AROUND A LOT MORE THAN USUAL: SEVERAL DAYS
3. TROUBLE FALLING OR STAYING ASLEEP: MORE THAN HALF THE DAYS
5. POOR APPETITE OR OVEREATING: SEVERAL DAYS
9. THOUGHTS THAT YOU WOULD BE BETTER OFF DEAD, OR OF HURTING YOURSELF: NOT AT ALL
1. LITTLE INTEREST OR PLEASURE IN DOING THINGS: SEVERAL DAYS
SUM OF ALL RESPONSES TO PHQ QUESTIONS 1-9: 8
10. IF YOU CHECKED OFF ANY PROBLEMS, HOW DIFFICULT HAVE THESE PROBLEMS MADE IT FOR YOU TO DO YOUR WORK, TAKE CARE OF THINGS AT HOME, OR GET ALONG WITH OTHER PEOPLE: SOMEWHAT DIFFICULT
SUM OF ALL RESPONSES TO PHQ QUESTIONS 1-9: 8
7. TROUBLE CONCENTRATING ON THINGS, SUCH AS READING THE NEWSPAPER OR WATCHING TELEVISION: SEVERAL DAYS
SUM OF ALL RESPONSES TO PHQ9 QUESTIONS 1 & 2: 2
SUM OF ALL RESPONSES TO PHQ QUESTIONS 1-9: 8

## 2024-12-16 NOTE — PROGRESS NOTES
Florence CORNELIUS Case is a 69 y.o. female evaluated via telephone on 12/16/2024.          Florence CORNELIUS Case 1955      Chief Complaint   Patient presents with    Depression    Anxiety    Insomnia            Subjective:    69-year-old white female with history of depression, PTSD, anxiety, HLD, CKD, osteoporosis, presents for post-discharge follow up.  Patient is on Vraylar 1.5 mg, Trazodone 150 mg, Seroquel 100 mg and PRN Saphris.     Patient is calm and cooperative. More depressed due to financial issues. Sleep is poor. She would like med adjustment.  No SI. Spending  time with family. Daughters are supportive. Youngest son in long-term. Not interested in therapy. She has 2 dogs and 4 cats.       BP (!) 87/68 Comment: provider notified/ pt stated that she has low BP  Pulse 81   Temp 97.5 °F (36.4 °C)   Ht 1.651 m (5' 5\")   Wt 57.6 kg (127 lb)   LMP  (LMP Unknown)   SpO2 93%   BMI 21.13 kg/m²       Review of Systems - 14 point review:  Negative except    Constitutional: (fevers, chills, night sweats, wt loss/gain, change in appetite, fatigue, somnolence)    HEENT: (ear pain or discharge, hearing loss, ear ringing, sinus pressure, nosebleed, nasal discharge, sore throat, oral sores, tooth pain, bleeding gums, hoarse voice, neck pain)      Cardiovascular: (HTN, chest pain, elevated cholesterol/lipids, palpitations, leg swelling, leg pain with walking)    Respiratory: (cough, wheezing, snoring, SOB with activity (dyspnea), SOB while lying flat (orthopnea), awakening with severe SOB (paroxysmal nocturnal dyspnea))    Gastrointestinal: (NVD, constipation, abdominal pain, bright red stools, black tarry stools, stool incontinence)     Genitourinary:  (pelvic pain, burning or frequency of urination, urinary urgency, blood in urine incomplete bladder emptying, urinary incontinence, STD; MEN: testicular pain or swelling, erectile dysfunction; WOMEN: LMP, heavy menstrual bleeding (menorrhagia), irregular periods, postmenopausal

## 2024-12-17 ENCOUNTER — TELEPHONE (OUTPATIENT)
Dept: PSYCHIATRY | Age: 69
End: 2024-12-17

## 2024-12-17 NOTE — TELEPHONE ENCOUNTER
Pt met with writer after her appt with Dr Leonard yesterday.  Pt stated that her pt assist meds-Vraylar and Saphris will need to be renewed soon.  She said she had not received any info from [employer] regarding the renewal. Vraylar was last approved in Jan 2024 and Saphris was last approved in Feb 2024.  Called [employer] assist at [phone], spoke with Kasandra who said that pt was sent an email and letter on Nov 14 2024 stating before the renewal can be processed she has to apply for LIS with social security (extra help program). If she gets denied, she will have to send a copy of that letter to [employer] assist.  Paperwork for the Dr to fill out for each med will be faxed to this office.  Attempted to contact pt to discuss the above info-VM left with request for call back.    Pt called back and was given the info above.  She verbalized understanding.  She was informed that samples of Vraylar can be provided if needed while addressing above steps.  Do not have samples for Saphris.

## 2024-12-20 ENCOUNTER — INFUSION (OUTPATIENT)
Dept: ONCOLOGY | Facility: HOSPITAL | Age: 69
End: 2024-12-20
Payer: MEDICARE

## 2024-12-20 ENCOUNTER — LAB (OUTPATIENT)
Dept: LAB | Facility: HOSPITAL | Age: 69
End: 2024-12-20
Payer: MEDICARE

## 2024-12-20 VITALS
DIASTOLIC BLOOD PRESSURE: 58 MMHG | WEIGHT: 130 LBS | TEMPERATURE: 97.1 F | RESPIRATION RATE: 16 BRPM | OXYGEN SATURATION: 99 % | HEIGHT: 65 IN | SYSTOLIC BLOOD PRESSURE: 91 MMHG | HEART RATE: 69 BPM | BODY MASS INDEX: 21.66 KG/M2

## 2024-12-20 DIAGNOSIS — M81.0 OSTEOPOROSIS, UNSPECIFIED OSTEOPOROSIS TYPE, UNSPECIFIED PATHOLOGICAL FRACTURE PRESENCE: ICD-10-CM

## 2024-12-20 DIAGNOSIS — M81.0 OSTEOPOROSIS, UNSPECIFIED OSTEOPOROSIS TYPE, UNSPECIFIED PATHOLOGICAL FRACTURE PRESENCE: Primary | ICD-10-CM

## 2024-12-20 DIAGNOSIS — M81.0 OSTEOPOROSIS, POST-MENOPAUSAL: ICD-10-CM

## 2024-12-20 LAB
ALBUMIN SERPL-MCNC: 4.3 G/DL (ref 3.5–5.2)
ALBUMIN/GLOB SERPL: 2 G/DL
ALP SERPL-CCNC: 49 U/L (ref 39–117)
ALT SERPL W P-5'-P-CCNC: 39 U/L (ref 1–33)
ANION GAP SERPL CALCULATED.3IONS-SCNC: 9 MMOL/L (ref 5–15)
AST SERPL-CCNC: 27 U/L (ref 1–32)
BILIRUB SERPL-MCNC: 0.2 MG/DL (ref 0–1.2)
BUN SERPL-MCNC: 11 MG/DL (ref 8–23)
BUN/CREAT SERPL: 10.9 (ref 7–25)
CALCIUM SPEC-SCNC: 8.6 MG/DL (ref 8.6–10.5)
CHLORIDE SERPL-SCNC: 104 MMOL/L (ref 98–107)
CO2 SERPL-SCNC: 28 MMOL/L (ref 22–29)
CREAT SERPL-MCNC: 1.01 MG/DL (ref 0.57–1)
EGFRCR SERPLBLD CKD-EPI 2021: 60.8 ML/MIN/1.73
GLOBULIN UR ELPH-MCNC: 2.2 GM/DL
GLUCOSE SERPL-MCNC: 132 MG/DL (ref 65–99)
MAGNESIUM SERPL-MCNC: 2.3 MG/DL (ref 1.6–2.4)
PHOSPHATE SERPL-MCNC: 3.7 MG/DL (ref 2.5–4.5)
POTASSIUM SERPL-SCNC: 4 MMOL/L (ref 3.5–5.2)
PROT SERPL-MCNC: 6.5 G/DL (ref 6–8.5)
SODIUM SERPL-SCNC: 141 MMOL/L (ref 136–145)

## 2024-12-20 PROCEDURE — 80053 COMPREHEN METABOLIC PANEL: CPT

## 2024-12-20 PROCEDURE — 84100 ASSAY OF PHOSPHORUS: CPT

## 2024-12-20 PROCEDURE — 96372 THER/PROPH/DIAG INJ SC/IM: CPT

## 2024-12-20 PROCEDURE — 83735 ASSAY OF MAGNESIUM: CPT

## 2024-12-20 PROCEDURE — 82306 VITAMIN D 25 HYDROXY: CPT

## 2024-12-20 PROCEDURE — 36415 COLL VENOUS BLD VENIPUNCTURE: CPT

## 2024-12-20 PROCEDURE — 25010000002 DENOSUMAB 60 MG/ML SOLUTION PREFILLED SYRINGE: Performed by: INTERNAL MEDICINE

## 2024-12-20 RX ADMIN — DENOSUMAB 60 MG: 60 INJECTION SUBCUTANEOUS at 15:17

## 2024-12-21 LAB — 25(OH)D3 SERPL-MCNC: 50.1 NG/ML (ref 30–100)

## 2025-01-17 ENCOUNTER — TELEPHONE (OUTPATIENT)
Dept: PSYCHIATRY | Age: 70
End: 2025-01-17

## 2025-01-17 NOTE — TELEPHONE ENCOUNTER
Contacted pt to see if she had pursued the LIS as [facility] assist staff had said before the renewal for assist for the Vraylar and Saphris can be processed she has to apply for LIS with SS-if she gets denied she will have to send a copy of that letter to them (pt made aware of the above on 12/17/24.  She was also made aware that we have samples of Vraylar that can be provided if needed while above explored but do not have Saphris samples).  Pt stated that she has applied for the LIS but has not gotten a determination yet.  She will keep staff updated.

## 2025-02-18 ENCOUNTER — TELEPHONE (OUTPATIENT)
Dept: PSYCHIATRY | Age: 70
End: 2025-02-18

## 2025-02-18 NOTE — TELEPHONE ENCOUNTER
Pt came to office to  Vraylar 1.5 mg samples.  She reported that she talked to  office staff last week and the LIS determination is still pending.  Pt will contact office when she gets the determination.

## 2025-03-16 DIAGNOSIS — E78.41 ELEVATED LIPOPROTEIN(A): ICD-10-CM

## 2025-03-19 RX ORDER — ATORVASTATIN CALCIUM 10 MG/1
10 TABLET, FILM COATED ORAL DAILY
Qty: 90 TABLET | Refills: 3 | Status: SHIPPED | OUTPATIENT
Start: 2025-03-19

## 2025-03-31 ENCOUNTER — OFFICE VISIT (OUTPATIENT)
Dept: INTERNAL MEDICINE | Facility: CLINIC | Age: 70
End: 2025-03-31
Payer: MEDICARE

## 2025-03-31 VITALS
DIASTOLIC BLOOD PRESSURE: 52 MMHG | HEART RATE: 60 BPM | SYSTOLIC BLOOD PRESSURE: 96 MMHG | HEIGHT: 65 IN | WEIGHT: 123.6 LBS | TEMPERATURE: 97.5 F | BODY MASS INDEX: 20.59 KG/M2 | OXYGEN SATURATION: 97 %

## 2025-03-31 DIAGNOSIS — Z59.86 FINANCIAL INSECURITY: ICD-10-CM

## 2025-03-31 DIAGNOSIS — E78.00 HYPERCHOLESTEREMIA: ICD-10-CM

## 2025-03-31 DIAGNOSIS — F41.8 DEPRESSION WITH ANXIETY: ICD-10-CM

## 2025-03-31 DIAGNOSIS — Z59.41 MODERATE FOOD INSECURITY: ICD-10-CM

## 2025-03-31 DIAGNOSIS — N18.31 STAGE 3A CHRONIC KIDNEY DISEASE: Primary | ICD-10-CM

## 2025-03-31 DIAGNOSIS — M81.0 AGE-RELATED OSTEOPOROSIS WITHOUT CURRENT PATHOLOGICAL FRACTURE: ICD-10-CM

## 2025-03-31 RX ORDER — TRAZODONE HYDROCHLORIDE 100 MG/1
TABLET ORAL 2 TIMES DAILY
COMMUNITY
Start: 2025-03-16

## 2025-03-31 SDOH — ECONOMIC STABILITY - FOOD INSECURITY: FOOD INSECURITY: Z59.41

## 2025-03-31 SDOH — ECONOMIC STABILITY - INCOME SECURITY: FINANCIAL INSECURITY: Z59.86

## 2025-03-31 NOTE — PROGRESS NOTES
Chris Castro Case is a 69 y.o. female.   Chief Complaint   Patient presents with    Hypercholestermia     6 mo f/u    Depression with Anxiety     6 mo f/u;  Patient states sx have worsen since last visit.   States sx do not occur daily.        History of Present Illness   History of Present Illness  The patient is a 69-year-old female who presents to the office today to discuss anxiety, depression, and chronic kidney disease.    She was scheduled for a visit 2 to 4 weeks prior but was unable to attend. Over the past few months, she has been making dietary modifications, resulting in a weight loss of 5 pounds. She has been overweight by 25 pounds for the last 5 years per her standards. Her motivation for weight loss stems from her daughters' successful weight loss journeys and the desire to fit into the clothes they have outgrown. She acknowledges that her eating habits have not always been healthy and admits to a lack of muscle mass. Her weight loss has been gradual, with no significant changes observed over the past 3 weeks. She reports constant hunger throughout the day. She has recently resumed soda consumption after a 2-year hiatus, drinking less than one 12-ounce can daily. Her fluid intake includes approximately two bottles of water daily. She has financial difficulties and is unable to afford nutritious food. She has been mindful of her food intake, consuming only two meals per day, which are not always nutritionally balanced.  She indicates that she currently has 1 leftover freezer meal in her freezer and will not have any other food for the rest of the week.  She states currently utilities are paid for the month.    She reports no chest pain or shortness of breath. She believes she is generally healthy, with the exception of her kidney condition.    She is on medication for hypercholesterolemia.    Her blood pressure is usually low.    She is on Prolia injections for osteoporosis.    She is currently  on antidepressant medication(s), which is covered by her insurance and assistive cost coverage programs. However, she was informed that she needs to apply for a special health program to continue receiving this benefit. Despite submitting her bank statements, her application was denied. She plans to follow up on this issue this week.     In the meantime, she has been receiving free samples from her provider's office. She rates her current anxiety level at 2 or 3, which is slightly elevated due to recent stressors, including her son's legal issues and financial strain. She is under the care of a mental health provider.    MEDICATIONS  Current: Seroquel, trazodone, Vraylar, asenapine meliate, Lipitor, Farxiga, Prolia       The following portions of the patient's history were reviewed and updated as appropriate: allergies, current medications, past family history, past medical history, past social history, past surgical history and problem list.    Review of Systems    Objective   Past Medical History:   Diagnosis Date    ADHD (attention deficit hyperactivity disorder)     Dr Du Packer diagnosed me    Ankle fracture     Anxiety     Arthritis     Depression     Elevated cholesterol     Hypokalemia 2021    Migraine     Nasal fracture     PONV (postoperative nausea and vomiting)     PTSD (post-traumatic stress disorder)     Smoker     Vision loss     Wrist fracture     right wrist      Past Surgical History:   Procedure Laterality Date    ADENOIDECTOMY  1960    AUGMENTATION MAMMAPLASTY Bilateral      SECTION      x 4     FRACTURE SURGERY  2018    Nose    HYSTERECTOMY      NASAL FRACTURE CLOSED REDUCTION N/A 2018    Procedure: closed nasal reduction with stabilization  2) open nasal septal fracture repair  ;  Surgeon: Darryl Benites MD;  Location: [facility];  Service: ENT    TONSILLECTOMY      TUBAL ABDOMINAL LIGATION  85        Current Outpatient Medications:     Asenapine Maleate  "2.5 MG sublingual tablet, Take 2 tablets by mouth 2 (Two) Times a Day., Disp: , Rfl:     atorvastatin (LIPITOR) 10 MG tablet, TAKE 1 TABLET BY MOUTH EVERY DAY, Disp: 90 tablet, Rfl: 3    Calcium Citrate-Vitamin D3 (CITRACAL) 315-6.25 MG-MCG tablet tablet, Take 1 tablet by mouth Daily., Disp: , Rfl:     dapagliflozin (Farxiga) 5 MG tablet tablet, Take 1 tablet by mouth Daily., Disp: 90 tablet, Rfl: 3    Diclofenac Sodium (VOLTAREN) 1 % gel gel, Apply 4 g topically to the appropriate area as directed 4 (Four) Times a Day As Needed (pain)., Disp: 100 g, Rfl: 3    SEROquel 50 MG tablet, Every Night., Disp: , Rfl:     traZODone (DESYREL) 100 MG tablet, Take  by mouth 2 (Two) Times a Day., Disp: , Rfl:     vitamin D (ERGOCALCIFEROL) 1.25 MG (34040 UT) capsule capsule, Take 1 capsule by mouth 1 (One) Time Per Week., Disp: 12 capsule, Rfl: 3    Vraylar 1.5 MG capsule capsule, Take 1 capsule by mouth Daily., Disp: , Rfl:       BP 96/52 (BP Location: Left arm, Patient Position: Sitting, Cuff Size: Adult)   Pulse 60   Temp 97.5 °F (36.4 °C) (Temporal)   Ht 165.1 cm (65\")   Wt 56.1 kg (123 lb 9.6 oz)   LMP  (LMP Unknown)   SpO2 97%   BMI 20.57 kg/m²      Body mass index is 20.57 kg/m².  BMI is within normal parameters. No other follow-up for BMI required.       Physical Exam  Vitals and nursing note reviewed.   Constitutional:       General: She is not in acute distress.     Appearance: She is cachectic. She is not ill-appearing, toxic-appearing or diaphoretic.   HENT:      Head: Normocephalic and atraumatic.   Eyes:      Extraocular Movements: Extraocular movements intact.      Conjunctiva/sclera: Conjunctivae normal.      Pupils: Pupils are equal, round, and reactive to light.   Cardiovascular:      Rate and Rhythm: Normal rate and regular rhythm.      Pulses: Normal pulses.      Heart sounds: Normal heart sounds.   Pulmonary:      Effort: Pulmonary effort is normal.      Breath sounds: Normal breath sounds. "   Abdominal:      General: Bowel sounds are normal.      Palpations: Abdomen is soft.      Comments: Loose abd folds     Musculoskeletal:         General: Normal range of motion.      Cervical back: Normal range of motion and neck supple.   Skin:     General: Skin is warm and dry.   Neurological:      General: No focal deficit present.      Mental Status: She is alert and oriented to person, place, and time. Mental status is at baseline.   Psychiatric:         Mood and Affect: Mood normal.         Behavior: Behavior normal.         Thought Content: Thought content normal.         Judgment: Judgment normal.               Assessment & Plan   Diagnoses and all orders for this visit:    1. Stage 3a chronic kidney disease (Primary)  -     Comprehensive metabolic panel    2. Depression with anxiety    3. Age-related osteoporosis without current pathological fracture    4. Hypercholesteremia    5. Moderate food insecurity  -     Ambulatory Referral to Social Care Services (Amb Case Mgmt)    6. Financial insecurity  -     Ambulatory Referral to Social Care Services (Amb Case Mgmt)                 Assessment & Plan  1. Anxiety.  She reports increased anxiety due to recent stressful events involving her son and financial difficulties. She is currently following up with a psychiatrist and is on Seroquel, trazodone, Vraylar, and asenapine meliate. She indicates she is receiving her medications adequately. Blood work will be conducted today to assess her kidney function.    2. Depression.  She has a history of severe depression with a previous suicide attempt and self-admission to psychiatric care in fall 2024. She is currently under the care of a psychiatrist and receiving appropriate medications. She reports that her medications are being managed well by her mental health care provider.    3. Chronic kidney disease.  She is on Farxiga 5 mg daily. She has been encouraged to drink at least 64 ounces of water daily and avoid oral  NSAIDs for assistive management. Blood work will be conducted today to reassess her kidney function.    4. Hypercholesterolemia.  She has been advised to adhere to a low-cholesterol diet and continue taking Lipitor 10 mg daily.    5. Osteoporosis.  She will continue with Prolia injections for the management of her osteoporosis.    6. Weight management.  She has lost weight gradually over the past five to six months by eating twice a day and watching her diet (I personally have underlying suspicion that this has been more related to inadequate food availability). She has been advised against further weight loss due to concerns about muscle loss and bone density. Increasing water intake to four bottles a day and including more fruits, vegetables, and protein in her diet have been recommended. Case management will be involved to see if she is eligible for Meals on Wheels due to food insecurity.   I have asked her to please let me know if she does not hear from case management in a timely manner and if she finds herself without food again (was provided with resources to buy food for this week).     7. Hypotension.  Her blood pressure today was at baseline, which is 96/52.    Return in 6 months for annual Medicare wellness visit, anytime prior to this as needed for acute issues/concerns.      Patient or patient representative verbalized consent for the use of Ambient Listening during the visit with  NAHUM Ybarra for chart documentation. 3/31/2025  17:40 CDT    Please note that portions of this note were completed with a voice recognition program.     Electronically signed by NAHUM Ybarra, 03/31/25, 17:42 CDT.

## 2025-04-01 ENCOUNTER — REFERRAL TRIAGE (OUTPATIENT)
Age: 70
End: 2025-04-01
Payer: MEDICARE

## 2025-04-01 LAB
ALBUMIN SERPL-MCNC: 4.4 G/DL (ref 3.5–5.2)
ALBUMIN/GLOB SERPL: 2.2 G/DL
ALP SERPL-CCNC: 43 U/L (ref 39–117)
ALT SERPL-CCNC: 12 U/L (ref 1–33)
AST SERPL-CCNC: 16 U/L (ref 1–32)
BILIRUB SERPL-MCNC: <0.2 MG/DL (ref 0–1.2)
BUN SERPL-MCNC: 8 MG/DL (ref 8–23)
BUN/CREAT SERPL: 8.7 (ref 7–25)
CALCIUM SERPL-MCNC: 8.8 MG/DL (ref 8.6–10.5)
CHLORIDE SERPL-SCNC: 104 MMOL/L (ref 98–107)
CO2 SERPL-SCNC: 26.3 MMOL/L (ref 22–29)
CREAT SERPL-MCNC: 0.92 MG/DL (ref 0.57–1)
EGFRCR SERPLBLD CKD-EPI 2021: 68 ML/MIN/1.73
GLOBULIN SER CALC-MCNC: 2 GM/DL
GLUCOSE SERPL-MCNC: 85 MG/DL (ref 65–99)
POTASSIUM SERPL-SCNC: 4.4 MMOL/L (ref 3.5–5.2)
PROT SERPL-MCNC: 6.4 G/DL (ref 6–8.5)
SODIUM SERPL-SCNC: 140 MMOL/L (ref 136–145)

## 2025-04-03 ENCOUNTER — PATIENT OUTREACH (OUTPATIENT)
Age: 70
End: 2025-04-03
Payer: MEDICARE

## 2025-04-03 ENCOUNTER — TELEPHONE (OUTPATIENT)
Dept: PSYCHIATRY | Age: 70
End: 2025-04-03

## 2025-04-03 NOTE — OUTREACH NOTE
SW received referral via PCP re: food insecurity. SW attempted to reach patient. No Answer. LVM. Next outreach scheduled x 2 days.     Kait TOMAS -   Ambulatory Case Management    4/3/2025, 12:14 EDT

## 2025-04-07 ENCOUNTER — PATIENT OUTREACH (OUTPATIENT)
Age: 70
End: 2025-04-07
Payer: MEDICARE

## 2025-04-07 NOTE — OUTREACH NOTE
Social Work Assessment  Questions/Answers      Flowsheet Row Most Recent Value   Referral Source outpatient staff, outpatient clinic, physician   Reason for Consult community resources  [food]   Preferred Language English   Advance Care Planning Reviewed no concerns identified   People in Home child(emil), adult  [daughter in law]   Current Living Arrangements home   Potentially Unsafe Housing Conditions none   In the past 12 months has the electric, gas, oil, or water company threatened to shut off services in your home? No   Primary Care Provided by self   Provides Primary Care For no one   Family Caregiver if Needed child(emil), adult   Able to Return to Prior Arrangements yes   Employment Status retired   Source of Income social security   Financial/Environmental Concerns unable to afford food   Medications independent   Meal Preparation independent   Housekeeping independent   Laundry independent   Shopping independent   If for any reason you need help with day-to-day activities such as bathing, preparing meals, shopping, managing finances, etc., do you get the help you need? I don't need any help          SDOH updated and reviewed with the patient during this program:  Financial Resource Strain: Medium Risk (4/7/2025)    Overall Financial Resource Strain (CARDIA)     Difficulty of Paying Living Expenses: Somewhat hard      --     Food Insecurity: Food Insecurity Present (4/7/2025)    Hunger Vital Sign     Worried About Running Out of Food in the Last Year: Sometimes true     Ran Out of Food in the Last Year: Sometimes true      --     Housing Stability: Not At Risk (4/7/2025)    Housing Stability     Current Living Arrangements: home     Potentially Unsafe Housing Conditions: none      --     Transportation Needs: No Transportation Needs (4/7/2025)    PRAPARE - Transportation     Lack of Transportation (Medical): No     Lack of Transportation (Non-Medical): No      Patient Outreach    SW received referral via  PCP re: food insecurity. SW called and spoke to patient. Patient lives in home with daughter in law and drives herself to medical appts. Patient reports they just went to the grocery store and got food. Sw provided education on local food otero and meals on wheels. Patient reports plans to go to food bank on Thursday. SW provided information to PADD office to make arrangements for meals on wheels. Agreeable for this SW to MAIL additional financial resources to utilize, if needed. No additional concerns noted. This SW to continue to monitor x 30 days to ensure no additional needs arise prior to DC.      Contin[facility]   [facility] [address], [facility] [address]    Phone: [phone]    Request Status: Pending - No Request Sent    Services: Home Living Aide Services

## 2025-05-07 ENCOUNTER — PATIENT OUTREACH (OUTPATIENT)
Age: 70
End: 2025-05-07
Payer: MEDICARE

## 2025-05-07 NOTE — OUTREACH NOTE
Care Coordination    Per kaitlin review, no additional needs noted in the past 30 days. SW to discharge. Please re consult SW if additional needs arise.       Kait TOMAS -   Ambulatory Case Management    5/7/2025, 08:52 EDT

## 2025-05-09 ENCOUNTER — TELEPHONE (OUTPATIENT)
Dept: PSYCHIATRY | Age: 70
End: 2025-05-09

## 2025-05-09 NOTE — TELEPHONE ENCOUNTER
Pt was given # 28 of Vraylar 1.5 mg samples with written and verbal directions     Electronically signed by Timothy Thomas on 5/9/2025 at 2:16 PM

## 2025-05-14 ENCOUNTER — TELEPHONE (OUTPATIENT)
Dept: PULMONOLOGY | Age: 70
End: 2025-05-14

## 2025-05-14 NOTE — TELEPHONE ENCOUNTER
Pt returned office call, pt would like to close referral due to not needing. Pt doesn't have WILEY and is seeing another provider. Thank you.

## 2025-06-04 DIAGNOSIS — M81.0 OSTEOPOROSIS, UNSPECIFIED OSTEOPOROSIS TYPE, UNSPECIFIED PATHOLOGICAL FRACTURE PRESENCE: Primary | ICD-10-CM

## 2025-06-11 ENCOUNTER — TELEPHONE (OUTPATIENT)
Dept: PSYCHIATRY | Age: 70
End: 2025-06-11

## 2025-06-11 RX ORDER — QUETIAPINE FUMARATE 50 MG/1
50 TABLET, FILM COATED ORAL 2 TIMES DAILY
Qty: 180 TABLET | Refills: 2 | Status: SHIPPED | OUTPATIENT
Start: 2025-06-11

## 2025-06-11 NOTE — TELEPHONE ENCOUNTER
Pt was given # 28 of Vraylar 1.5 mg with written and verbal directions     Electronically signed by Timothy Thomas on 6/11/2025 at 1:51 PM

## 2025-06-11 NOTE — TELEPHONE ENCOUNTER
Pharmacy sent a request to refill pt's medication       Last office visit : 12/16/2024 S Horacio ANDRADE  Next office visit : 6/18/2025 S Horacio ANDRADE    Requested Prescriptions     Pending Prescriptions Disp Refills    QUEtiapine (SEROQUEL) 50 MG tablet [Pharmacy Med Name: QUETIAPINE FUMARATE 50 MG TAB] 180 tablet 2     Sig: TAKE 1 TABLET BY MOUTH TWICE A DAY            Timothy Estevezs        12/16/2024  [facility]       Nicolle Leonard MD  Psychiatry Major depressive disorder, recurrent, mild +2 more  Dx Depression  Anxiety  Insomnia  Reason for Visit     Progress Notes  Nicolle Leonard MD (Physician)  Psychiatry  The note has been blocked from the patient portal for the following reason: Likely risk of substantial harm  Expand All Collapse All  Florence CORNELIUS Case is a 69 y.o. female evaluated via telephone on 12/16/2024.             Florence CORNELIUS Case [id number]                               Chief Complaint   Patient presents with    Depression    Anxiety    Insomnia               Subjective:    69-year-old white female with history of depression, PTSD, anxiety, HLD, CKD, osteoporosis, presents for post-discharge follow up.  Patient is on Vraylar 1.5 mg, Trazodone 150 mg, Seroquel 100 mg and PRN Saphris.      Patient is calm and cooperative. More depressed due to financial issues. Sleep is poor. She would like med adjustment.  No SI. Spending  time with family. Daughters are supportive. Youngest son in alf. Not interested in therapy. She has 2 dogs and 4 cats.         BP (!) 87/68 Comment: provider notified/ pt stated that she has low BP  Pulse 81   Temp 97.5 °F (36.4 °C)   Ht 1.651 m (5' 5\")   Wt 57.6 kg (127 lb)   LMP  (LMP Unknown)   SpO2 93%   BMI 21.13 kg/m²         Review of Systems - 14 point review:  Negative except     Constitutional: (fevers, chills, night sweats, wt loss/gain, change in appetite, fatigue, somnolence)     HEENT: (ear pain or discharge, hearing loss, ear ringing, sinus

## 2025-06-16 ENCOUNTER — TELEPHONE (OUTPATIENT)
Dept: PSYCHIATRY | Age: 70
End: 2025-06-16

## 2025-06-16 NOTE — TELEPHONE ENCOUNTER
Appointment for: [place] ([id number])   Visit type:  FOLLOW UP (7065)   6/27/2025 10:30 AM (30 minutes) with Dr. Nicolle Leonard MD in [facility]      Patient comments:      ----------------------------------   This appointment rescheduled from:   6/18/2025 1:00 PM (30 minutes) with Dr. Nicolle Leonard MD in [facility]     Pt cancel/rescheduled appt through [employer].    Electronically signed by Cayla Hoffman MA on 6/16/2025 at 9:42 AM

## 2025-06-20 ENCOUNTER — LAB (OUTPATIENT)
Dept: LAB | Facility: HOSPITAL | Age: 70
End: 2025-06-20
Payer: MEDICARE

## 2025-06-20 ENCOUNTER — INFUSION (OUTPATIENT)
Dept: ONCOLOGY | Facility: HOSPITAL | Age: 70
End: 2025-06-20
Payer: MEDICARE

## 2025-06-20 VITALS
TEMPERATURE: 97.8 F | DIASTOLIC BLOOD PRESSURE: 55 MMHG | RESPIRATION RATE: 18 BRPM | SYSTOLIC BLOOD PRESSURE: 98 MMHG | HEART RATE: 76 BPM | OXYGEN SATURATION: 97 %

## 2025-06-20 DIAGNOSIS — M81.0 OSTEOPOROSIS, UNSPECIFIED OSTEOPOROSIS TYPE, UNSPECIFIED PATHOLOGICAL FRACTURE PRESENCE: Primary | ICD-10-CM

## 2025-06-20 DIAGNOSIS — M81.0 OSTEOPOROSIS, UNSPECIFIED OSTEOPOROSIS TYPE, UNSPECIFIED PATHOLOGICAL FRACTURE PRESENCE: ICD-10-CM

## 2025-06-20 LAB
CALCIUM SPEC-SCNC: 8.8 MG/DL (ref 8.6–10.5)
MAGNESIUM SERPL-MCNC: 2.1 MG/DL (ref 1.6–2.4)
PHOSPHATE SERPL-MCNC: 3 MG/DL (ref 2.5–4.5)

## 2025-06-20 PROCEDURE — 25010000002 DENOSUMAB 60 MG/ML SOLUTION PREFILLED SYRINGE: Performed by: INTERNAL MEDICINE

## 2025-06-20 PROCEDURE — 96372 THER/PROPH/DIAG INJ SC/IM: CPT

## 2025-06-20 PROCEDURE — 82310 ASSAY OF CALCIUM: CPT

## 2025-06-20 PROCEDURE — 84100 ASSAY OF PHOSPHORUS: CPT

## 2025-06-20 PROCEDURE — 83735 ASSAY OF MAGNESIUM: CPT

## 2025-06-20 PROCEDURE — 36415 COLL VENOUS BLD VENIPUNCTURE: CPT

## 2025-06-20 RX ADMIN — DENOSUMAB 60 MG: 60 INJECTION SUBCUTANEOUS at 10:26

## 2025-06-20 NOTE — PROGRESS NOTES
Sent a message to provider that patient is here for prolia reports started grinding teeth constantly over last 4-6 months has to make self stop. Unsure if happening at night while asleep. Labwork today is good. Is it ok to proceed with injection plus who should she see about this. Patient asking if its a possible side affect. Pharmacy says not noted but could worsen any necrosis if did occur.    Provider responded on secure chat with: ok to continue prolia - encourage her to call our office [phone] to set up appointment to discuss the teeth griding if she would like. Thank you.    Patient was informed and reports understanding. Dionne Bass RN

## 2025-07-11 ENCOUNTER — TELEPHONE (OUTPATIENT)
Dept: PSYCHIATRY | Age: 70
End: 2025-07-11

## 2025-07-11 NOTE — TELEPHONE ENCOUNTER
Can you put in an sample order for #28 OF Vraylar 1.5 mg       Electronically signed by Timothy Thomas on 7/11/2025 at 11:00 AM

## 2025-07-18 ENCOUNTER — TELEPHONE (OUTPATIENT)
Dept: PSYCHIATRY | Age: 70
End: 2025-07-18

## 2025-07-18 NOTE — TELEPHONE ENCOUNTER
Pt was given # 28 of Vraylar 1.5 mg with written and verbal directions.    Electronically signed by Cayla Hoffman MA on 7/18/2025 at 1:39 PM

## 2025-08-11 ENCOUNTER — TELEPHONE (OUTPATIENT)
Dept: PSYCHIATRY | Age: 70
End: 2025-08-11

## 2025-08-18 ENCOUNTER — LAB (OUTPATIENT)
Dept: INTERNAL MEDICINE | Facility: CLINIC | Age: 70
End: 2025-08-18
Payer: MEDICARE

## 2025-08-18 DIAGNOSIS — E55.9 VITAMIN D INSUFFICIENCY: ICD-10-CM

## 2025-08-18 DIAGNOSIS — E78.00 HYPERCHOLESTEREMIA: Primary | ICD-10-CM

## 2025-08-18 DIAGNOSIS — Z79.899 ENCOUNTER FOR LONG-TERM (CURRENT) USE OF MEDICATIONS: ICD-10-CM

## 2025-08-28 ENCOUNTER — OFFICE VISIT (OUTPATIENT)
Dept: INTERNAL MEDICINE | Facility: CLINIC | Age: 70
End: 2025-08-28
Payer: MEDICARE

## 2025-08-28 VITALS
HEIGHT: 65 IN | OXYGEN SATURATION: 98 % | TEMPERATURE: 97 F | DIASTOLIC BLOOD PRESSURE: 70 MMHG | HEART RATE: 89 BPM | WEIGHT: 111.4 LBS | SYSTOLIC BLOOD PRESSURE: 120 MMHG | BODY MASS INDEX: 18.56 KG/M2

## 2025-08-28 DIAGNOSIS — N18.31 STAGE 3A CHRONIC KIDNEY DISEASE: ICD-10-CM

## 2025-08-28 DIAGNOSIS — Z00.00 ENCOUNTER FOR SUBSEQUENT ANNUAL WELLNESS VISIT (AWV) IN MEDICARE PATIENT: Primary | ICD-10-CM

## 2025-08-28 DIAGNOSIS — F41.8 DEPRESSION WITH ANXIETY: ICD-10-CM

## 2025-08-28 DIAGNOSIS — M81.0 AGE-RELATED OSTEOPOROSIS WITHOUT CURRENT PATHOLOGICAL FRACTURE: ICD-10-CM

## 2025-08-28 DIAGNOSIS — Z12.2 SCREENING FOR LUNG CANCER: ICD-10-CM

## 2025-08-28 DIAGNOSIS — F17.200 SMOKER: ICD-10-CM

## 2025-08-28 DIAGNOSIS — Z87.891 HISTORY OF NICOTINE DEPENDENCE: ICD-10-CM

## 2025-08-28 DIAGNOSIS — R68.84 JAW PAIN: ICD-10-CM

## 2025-08-28 DIAGNOSIS — E78.00 HYPERCHOLESTEREMIA: ICD-10-CM

## 2025-08-28 DIAGNOSIS — E55.9 VITAMIN D INSUFFICIENCY: ICD-10-CM

## 2025-08-28 DIAGNOSIS — Z11.59 NEED FOR HEPATITIS C SCREENING TEST: ICD-10-CM

## 2025-08-28 DIAGNOSIS — Z78.0 POSTMENOPAUSE: ICD-10-CM

## 2025-08-28 DIAGNOSIS — Z59.86 FINANCIAL INSECURITY: ICD-10-CM

## 2025-08-28 DIAGNOSIS — Z12.31 ENCOUNTER FOR SCREENING MAMMOGRAM FOR MALIGNANT NEOPLASM OF BREAST: ICD-10-CM

## 2025-08-28 DIAGNOSIS — Z59.41 MODERATE FOOD INSECURITY: ICD-10-CM

## 2025-08-28 SDOH — ECONOMIC STABILITY - FOOD INSECURITY: FOOD INSECURITY: Z59.41

## 2025-08-28 SDOH — ECONOMIC STABILITY - INCOME SECURITY: FINANCIAL INSECURITY: Z59.86

## 2025-08-29 LAB
25(OH)D3+25(OH)D2 SERPL-MCNC: 43.6 NG/ML (ref 30–100)
ALBUMIN SERPL-MCNC: 4.5 G/DL (ref 3.5–5.2)
ALBUMIN/GLOB SERPL: 2 G/DL
ALP SERPL-CCNC: 49 U/L (ref 39–117)
ALT SERPL-CCNC: 17 U/L (ref 1–33)
APPEARANCE UR: CLEAR
AST SERPL-CCNC: 22 U/L (ref 1–32)
BACTERIA #/AREA URNS HPF: ABNORMAL /HPF
BASOPHILS # BLD AUTO: 0.05 10*3/MM3 (ref 0–0.2)
BASOPHILS NFR BLD AUTO: 0.6 % (ref 0–1.5)
BILIRUB SERPL-MCNC: <0.2 MG/DL (ref 0–1.2)
BILIRUB UR QL STRIP: NEGATIVE
BUN SERPL-MCNC: 15 MG/DL (ref 8–23)
BUN/CREAT SERPL: 15.5 (ref 7–25)
CALCIUM SERPL-MCNC: 9.2 MG/DL (ref 8.6–10.5)
CASTS URNS MICRO: ABNORMAL
CHLORIDE SERPL-SCNC: 107 MMOL/L (ref 98–107)
CHOLEST SERPL-MCNC: 251 MG/DL (ref 0–200)
CO2 SERPL-SCNC: 23.6 MMOL/L (ref 22–29)
COLOR UR: YELLOW
CREAT SERPL-MCNC: 0.97 MG/DL (ref 0.57–1)
CRYSTALS URNS MICRO: ABNORMAL
EGFRCR SERPLBLD CKD-EPI 2021: 63.4 ML/MIN/1.73
EOSINOPHIL # BLD AUTO: 0.05 10*3/MM3 (ref 0–0.4)
EOSINOPHIL NFR BLD AUTO: 0.6 % (ref 0.3–6.2)
EPI CELLS #/AREA URNS HPF: ABNORMAL /HPF
ERYTHROCYTE [DISTWIDTH] IN BLOOD BY AUTOMATED COUNT: 13.3 % (ref 12.3–15.4)
GLOBULIN SER CALC-MCNC: 2.3 GM/DL
GLUCOSE SERPL-MCNC: 87 MG/DL (ref 65–99)
GLUCOSE UR QL STRIP: NEGATIVE
HCT VFR BLD AUTO: 42.6 % (ref 34–46.6)
HDLC SERPL-MCNC: 50 MG/DL (ref 40–60)
HGB BLD-MCNC: 13.8 G/DL (ref 12–15.9)
HGB UR QL STRIP: NEGATIVE
IMM GRANULOCYTES # BLD AUTO: 0.02 10*3/MM3 (ref 0–0.05)
IMM GRANULOCYTES NFR BLD AUTO: 0.3 % (ref 0–0.5)
KETONES UR QL STRIP: ABNORMAL
LDLC SERPL CALC-MCNC: 171 MG/DL (ref 0–100)
LEUKOCYTE ESTERASE UR QL STRIP: NEGATIVE
LYMPHOCYTES # BLD AUTO: 2.36 10*3/MM3 (ref 0.7–3.1)
LYMPHOCYTES NFR BLD AUTO: 30.5 % (ref 19.6–45.3)
MCH RBC QN AUTO: 29.7 PG (ref 26.6–33)
MCHC RBC AUTO-ENTMCNC: 32.4 G/DL (ref 31.5–35.7)
MCV RBC AUTO: 91.8 FL (ref 79–97)
MONOCYTES # BLD AUTO: 0.51 10*3/MM3 (ref 0.1–0.9)
MONOCYTES NFR BLD AUTO: 6.6 % (ref 5–12)
NEUTROPHILS # BLD AUTO: 4.75 10*3/MM3 (ref 1.7–7)
NEUTROPHILS NFR BLD AUTO: 61.4 % (ref 42.7–76)
NITRITE UR QL STRIP: NEGATIVE
NRBC BLD AUTO-RTO: 0 /100 WBC (ref 0–0.2)
PH UR STRIP: 5.5 [PH] (ref 5–8)
PLATELET # BLD AUTO: 310 10*3/MM3 (ref 140–450)
POTASSIUM SERPL-SCNC: 4.1 MMOL/L (ref 3.5–5.2)
PROT SERPL-MCNC: 6.8 G/DL (ref 6–8.5)
PROT UR QL STRIP: ABNORMAL
RBC # BLD AUTO: 4.64 10*6/MM3 (ref 3.77–5.28)
RBC #/AREA URNS HPF: ABNORMAL /HPF
SODIUM SERPL-SCNC: 142 MMOL/L (ref 136–145)
SP GR UR STRIP: >1.03 (ref 1–1.03)
TRIGL SERPL-MCNC: 166 MG/DL (ref 0–150)
TSH SERPL DL<=0.005 MIU/L-ACNC: 1.26 UIU/ML (ref 0.27–4.2)
UROBILINOGEN UR STRIP-MCNC: ABNORMAL MG/DL
VLDLC SERPL CALC-MCNC: 30 MG/DL (ref 5–40)
WBC # BLD AUTO: 7.74 10*3/MM3 (ref 3.4–10.8)
WBC #/AREA URNS HPF: ABNORMAL /HPF

## 2025-09-02 ENCOUNTER — TELEPHONE (OUTPATIENT)
Dept: PSYCHIATRY | Age: 70
End: 2025-09-02

## (undated) DEVICE — UTILITY MARKER W/MED LABELS: Brand: MEDLINE

## (undated) DEVICE — SPLINT 1529010 10PK THERMASPLINT MEDIUM

## (undated) DEVICE — PACK,SET UP,NO DRAPES: Brand: MEDLINE

## (undated) DEVICE — TOWEL,OR,DSP,ST,BLUE,STD,4/PK,20PK/CS: Brand: MEDLINE

## (undated) DEVICE — STANDARD HYPODERMIC NEEDLE,POLYPROPYLENE HUB: Brand: MONOJECT

## (undated) DEVICE — PK TURNOVER RM ADV

## (undated) DEVICE — GOWN,NON-REINFORCED,SIRUS,SET IN SLV,XL: Brand: MEDLINE

## (undated) DEVICE — CONTAINER,SPECIMEN,OR STERILE,4OZ: Brand: MEDLINE

## (undated) DEVICE — SYR CONTRL LUERLOK 10CC

## (undated) DEVICE — 3M™ STERI-STRIP™ REINFORCED ADHESIVE SKIN CLOSURES, R1547, 1/2 IN X 4 IN (12 MM X 100 MM), 6 STRIPS/ENVELOPE: Brand: 3M™ STERI-STRIP™

## (undated) DEVICE — TUBING, SUCTION, 1/4" X 12', STRAIGHT: Brand: MEDLINE

## (undated) DEVICE — SURGICAL SUCTION CONNECTING TUBE WITH MALE CONNECTOR AND SUCTION CLAMP, 2 FT. LONG (.6 M), 5 MM I.D.: Brand: CONMED

## (undated) DEVICE — GLV SURG BIOGEL LTX PF 8

## (undated) DEVICE — ADHS LIQ MASTISOL 2/3ML

## (undated) DEVICE — SPONGE,NEURO,0.5"X3",XR,STRL,LF,10/PK: Brand: MEDLINE